# Patient Record
Sex: FEMALE | Race: WHITE | NOT HISPANIC OR LATINO | Employment: FULL TIME | URBAN - METROPOLITAN AREA
[De-identification: names, ages, dates, MRNs, and addresses within clinical notes are randomized per-mention and may not be internally consistent; named-entity substitution may affect disease eponyms.]

---

## 2017-10-03 ENCOUNTER — OFFICE VISIT (OUTPATIENT)
Dept: URGENT CARE | Facility: CLINIC | Age: 52
End: 2017-10-03
Payer: COMMERCIAL

## 2017-10-03 ENCOUNTER — GENERIC CONVERSION - ENCOUNTER (OUTPATIENT)
Dept: OTHER | Facility: OTHER | Age: 52
End: 2017-10-03

## 2017-10-03 PROCEDURE — 93005 ELECTROCARDIOGRAM TRACING: CPT

## 2017-10-03 PROCEDURE — 99203 OFFICE O/P NEW LOW 30 MIN: CPT

## 2017-10-05 NOTE — PROGRESS NOTES
Assessment  1  Cellulitis of left lower extremity (682 6) (L03 [id number])    Plan  Cellulitis of left lower extremity    · Cephalexin 500 MG Oral Capsule; TAKE 1 CAPSULE 3 TIMES DAILY UNTIL GONE    Discussion/Summary  Discussion Summary:   Start antibiotic and take as directed  If area of redness is spreading please go to the emergency room  Medication Side Effects Reviewed: Possible side effects of new medications were reviewed with the patient/guardian today  Understands and agrees with treatment plan: The treatment plan was reviewed with the patient/guardian  The patient/guardian understands and agrees with the treatment plan   Follow Up Instructions: Follow Up with your Primary Care Provider in 7 days  If your symptoms worsen, go to the nearest [place] [address] Emergency Department  Chief Complaint  Chief Complaint Free Text Note Form: C/O swelling in b/l lower legs and feet , with red patch on lower posterior left leg after riding a bus for several hours 2 days ago  The swelling has decreased in b/l legs but the reddened area persists  History of Present Illness  HPI: 78-year-old female here with complaint of bilateral leg swelling after sitting on a bus for several hours 2 days ago  Started with a red area on her left lower posterior leg  The swelling in her legs has decreased but she has a persistent red tender area in the back of her lower leg  She also has some chills  Denies fever  Hospital Based Practices Required Assessment:   Pain Assessment   the patient states they do not have pain  (on a scale of 0 to 10, the patient rates the pain at 0 )   Abuse And Domestic Violence Screen    Yes, the patient is safe at home -The patient states no one is hurting them  Depression And Suicide Screen  No, the patient has not had thoughts of hurting themself  No, the patient has not felt depressed in the past 7 days  Rash: BETSY WAGONER presents with complaints of rash     Associated symptoms include pain,-skin redness-and-skin swelling, but-no skin blistering,-no skin bumps,-no cracking,-no crusting,-no draining,-no skin dryness,-no skin oiliness-and-no pruritus  Review of Systems  Focused-Female:   Constitutional: chills, but-no fever-and-not feeling poorly  ENT: no ear ache, no loss of hearing, no nosebleeds or nasal discharge, no sore throat or hoarseness  Cardiovascular: no complaints of slow or fast heart rate, no chest pain, no palpitations, no leg claudication or lower extremity edema  Respiratory: no complaints of shortness of breath, no wheezing, no dyspnea on exertion, no orthopnea or PND  Integumentary: as noted in HPI  ROS Reviewed:   ROS reviewed  Past Medical History  Active Problems And Past Medical History Reviewed: The active problems and past medical history were reviewed and updated today  Family History  Family History Reviewed: The family history was reviewed and updated today  Social History  Social History Reviewed: The social history was reviewed and updated today  The social history was reviewed and is unchanged  Surgical History  Surgical History Reviewed: The surgical history was reviewed and updated today  Current Meds   1  Amiodarone HCl - 100 MG Oral Tablet; Therapy: (Recorded:[id number]) to Recorded   2  Eliquis 5 MG Oral Tablet; Therapy: (Recorded:[id number]) to Recorded   3  Metoprolol Tartrate 25 MG Oral Tablet; Therapy: (51 196 19 99) to Recorded   4  Rosuvastatin Calcium 20 MG Oral Tablet; Therapy: (51 196 19 99) to Recorded  Medication List Reviewed: The medication list was reviewed and updated today  Allergies  1  aspirin    Physical Exam    Constitutional   General appearance: No acute distress, well appearing and well nourished  Ears, Nose, Mouth, and Throat   External inspection of ears and nose: Normal     Otoscopic examination: Tympanic membranes translucent with normal light reflex   Canals patent without erythema  Nasal mucosa, septum, and turbinates: Normal without edema or erythema  Oropharynx: Normal with no erythema, edema, exudate or lesions  Pulmonary   Respiratory effort: No increased work of breathing or signs of respiratory distress  Auscultation of lungs: Clear to auscultation  Cardiovascular   Auscultation of heart: Normal rate and rhythm, normal S1 and S2, without murmurs  Examination of extremities for edema and/or varicosities: Abnormal  -Varicose veins bilateral lower extremities  Area on posterior lower left leg with erythema measures approximately 2-3 inches in diameter  Tender to palpation        Signatures   Electronically signed by : Hamilton Aleman [facility]; Oct  3 2017 11:35AM EST                       (Author)    Electronically signed by : SHA Coleman ; Oct  4 2017 12:18PM EST                       (Co-author)

## 2018-01-12 VITALS
SYSTOLIC BLOOD PRESSURE: 100 MMHG | WEIGHT: 200 LBS | DIASTOLIC BLOOD PRESSURE: 60 MMHG | TEMPERATURE: 98.5 F | HEART RATE: 58 BPM | RESPIRATION RATE: 18 BRPM | OXYGEN SATURATION: 96 %

## 2018-06-26 ENCOUNTER — TELEPHONE (OUTPATIENT)
Dept: FAMILY MEDICINE CLINIC | Facility: CLINIC | Age: 53
End: 2018-06-26

## 2018-07-11 ENCOUNTER — OFFICE VISIT (OUTPATIENT)
Dept: FAMILY MEDICINE CLINIC | Facility: CLINIC | Age: 53
End: 2018-07-11
Payer: COMMERCIAL

## 2018-07-11 VITALS
BODY MASS INDEX: 31.1 KG/M2 | HEART RATE: 83 BPM | HEIGHT: 69 IN | RESPIRATION RATE: 19 BRPM | OXYGEN SATURATION: 98 % | SYSTOLIC BLOOD PRESSURE: 98 MMHG | DIASTOLIC BLOOD PRESSURE: 64 MMHG | TEMPERATURE: 96.9 F | WEIGHT: 210 LBS

## 2018-07-11 DIAGNOSIS — I48.0 PAROXYSMAL ATRIAL FIBRILLATION (HCC): ICD-10-CM

## 2018-07-11 DIAGNOSIS — R42 DIZZINESS: Primary | ICD-10-CM

## 2018-07-11 DIAGNOSIS — I10 BENIGN HYPERTENSION: ICD-10-CM

## 2018-07-11 DIAGNOSIS — I50.22 CHRONIC SYSTOLIC HEART FAILURE (HCC): ICD-10-CM

## 2018-07-11 LAB
ALBUMIN SERPL BCP-MCNC: 4.2 G/DL (ref 3.5–5)
ALP SERPL-CCNC: 52 U/L (ref 46–116)
ALT SERPL W P-5'-P-CCNC: 29 U/L (ref 12–78)
ANION GAP SERPL CALCULATED.3IONS-SCNC: 7 MMOL/L (ref 4–13)
AST SERPL W P-5'-P-CCNC: 14 U/L (ref 5–45)
BASOPHILS # BLD AUTO: 0.04 THOUSANDS/ΜL (ref 0–0.1)
BASOPHILS NFR BLD AUTO: 1 % (ref 0–1)
BILIRUB SERPL-MCNC: 0.69 MG/DL (ref 0.2–1)
BUN SERPL-MCNC: 40 MG/DL (ref 5–25)
CALCIUM SERPL-MCNC: 9.1 MG/DL (ref 8.3–10.1)
CHLORIDE SERPL-SCNC: 106 MMOL/L (ref 100–108)
CO2 SERPL-SCNC: 23 MMOL/L (ref 21–32)
CREAT SERPL-MCNC: 1.97 MG/DL (ref 0.6–1.3)
EOSINOPHIL # BLD AUTO: 0.29 THOUSAND/ΜL (ref 0–0.61)
EOSINOPHIL NFR BLD AUTO: 4 % (ref 0–6)
ERYTHROCYTE [DISTWIDTH] IN BLOOD BY AUTOMATED COUNT: 12.3 % (ref 11.6–15.1)
GFR SERPL CREATININE-BSD FRML MDRD: 28 ML/MIN/1.73SQ M
GLUCOSE P FAST SERPL-MCNC: 97 MG/DL (ref 65–99)
HCT VFR BLD AUTO: 40 % (ref 34.8–46.1)
HGB BLD-MCNC: 13.2 G/DL (ref 11.5–15.4)
IMM GRANULOCYTES # BLD AUTO: 0.02 THOUSAND/UL (ref 0–0.2)
IMM GRANULOCYTES NFR BLD AUTO: 0 % (ref 0–2)
LYMPHOCYTES # BLD AUTO: 2.84 THOUSANDS/ΜL (ref 0.6–4.47)
LYMPHOCYTES NFR BLD AUTO: 36 % (ref 14–44)
MCH RBC QN AUTO: 30.6 PG (ref 26.8–34.3)
MCHC RBC AUTO-ENTMCNC: 33 G/DL (ref 31.4–37.4)
MCV RBC AUTO: 93 FL (ref 82–98)
MONOCYTES # BLD AUTO: 0.62 THOUSAND/ΜL (ref 0.17–1.22)
MONOCYTES NFR BLD AUTO: 8 % (ref 4–12)
NEUTROPHILS # BLD AUTO: 4.15 THOUSANDS/ΜL (ref 1.85–7.62)
NEUTS SEG NFR BLD AUTO: 51 % (ref 43–75)
NRBC BLD AUTO-RTO: 0 /100 WBCS
PLATELET # BLD AUTO: 228 THOUSANDS/UL (ref 149–390)
PMV BLD AUTO: 11.4 FL (ref 8.9–12.7)
POTASSIUM SERPL-SCNC: 4.5 MMOL/L (ref 3.5–5.3)
PROT SERPL-MCNC: 7.8 G/DL (ref 6.4–8.2)
RBC # BLD AUTO: 4.31 MILLION/UL (ref 3.81–5.12)
SODIUM SERPL-SCNC: 136 MMOL/L (ref 136–145)
TSH SERPL DL<=0.05 MIU/L-ACNC: 0.69 UIU/ML (ref 0.36–3.74)
WBC # BLD AUTO: 7.96 THOUSAND/UL (ref 4.31–10.16)

## 2018-07-11 PROCEDURE — 36415 COLL VENOUS BLD VENIPUNCTURE: CPT | Performed by: FAMILY MEDICINE

## 2018-07-11 PROCEDURE — 80053 COMPREHEN METABOLIC PANEL: CPT | Performed by: FAMILY MEDICINE

## 2018-07-11 PROCEDURE — 85025 COMPLETE CBC W/AUTO DIFF WBC: CPT | Performed by: FAMILY MEDICINE

## 2018-07-11 PROCEDURE — 99214 OFFICE O/P EST MOD 30 MIN: CPT | Performed by: FAMILY MEDICINE

## 2018-07-11 PROCEDURE — 84443 ASSAY THYROID STIM HORMONE: CPT | Performed by: FAMILY MEDICINE

## 2018-07-11 RX ORDER — METOPROLOL SUCCINATE 25 MG/1
25 TABLET, EXTENDED RELEASE ORAL 2 TIMES DAILY
COMMUNITY
Start: 2017-11-23

## 2018-07-11 RX ORDER — ROSUVASTATIN CALCIUM 20 MG/1
20 TABLET, COATED ORAL
COMMUNITY
Start: 2018-03-26

## 2018-07-11 RX ORDER — APREMILAST 30 MG/1
TABLET, FILM COATED ORAL 2 TIMES DAILY
COMMUNITY
Start: 2018-07-10 | End: 2018-12-07 | Stop reason: ALTCHOICE

## 2018-07-11 RX ORDER — ACETAMINOPHEN 500 MG
500 TABLET ORAL EVERY 6 HOURS
COMMUNITY
End: 2018-12-07 | Stop reason: SDUPTHER

## 2018-07-11 RX ORDER — LISINOPRIL 10 MG/1
5 TABLET ORAL DAILY
COMMUNITY
Start: 2018-05-23 | End: 2021-04-16

## 2018-07-11 RX ORDER — SPIRONOLACTONE 25 MG/1
TABLET ORAL
COMMUNITY
Start: 2018-05-23 | End: 2018-07-24 | Stop reason: ALTCHOICE

## 2018-07-11 RX ORDER — DOFETILIDE 0.25 MG/1
CAPSULE ORAL
COMMUNITY
Start: 2018-06-21 | End: 2018-11-09 | Stop reason: ALTCHOICE

## 2018-07-11 RX ORDER — FUROSEMIDE 40 MG/1
1 TABLET ORAL SEE ADMIN INSTRUCTIONS
COMMUNITY
Start: 2018-06-19

## 2018-07-11 RX ORDER — RIVAROXABAN 20 MG/1
TABLET, FILM COATED ORAL
COMMUNITY
Start: 2018-07-05

## 2018-07-11 NOTE — PATIENT INSTRUCTIONS
1  Blood pressure is too low in the range of  systolic over 35-74 diastolic  She is on furosemide and spironolactone so we will discontinue spironolactone starting tomorrow  Increase fluids and okay to have some salt pretzels today  2  We will contact her cardiologist to arrange a Holter monitor  I will also update them the medication changes  3  Obtain blood work today to check for anemia hypothyroidism kidney or electrolyte abnormalities  4  Follow up with me in 1 week for a blood pressure check  5  Discussed signs and symptoms of emergency and went to go to the local emergency room

## 2018-07-15 PROBLEM — I42.9 CARDIOMYOPATHY (HCC): Status: ACTIVE | Noted: 2017-10-09

## 2018-07-15 PROBLEM — I10 BENIGN HYPERTENSION: Status: ACTIVE | Noted: 2018-05-25

## 2018-07-15 PROBLEM — I50.20 SYSTOLIC HEART FAILURE (HCC): Status: ACTIVE | Noted: 2018-01-24

## 2018-07-15 PROBLEM — I73.9 PERIPHERAL VASCULAR DISEASE (HCC): Status: ACTIVE | Noted: 2017-10-03

## 2018-07-15 PROBLEM — I48.0 PAROXYSMAL ATRIAL FIBRILLATION (HCC): Status: ACTIVE | Noted: 2018-02-23

## 2018-07-15 PROBLEM — I25.10 CAD (CORONARY ARTERY DISEASE): Status: ACTIVE | Noted: 2018-01-30

## 2018-07-15 PROBLEM — I50.22 CHRONIC SYSTOLIC CHF (CONGESTIVE HEART FAILURE) (HCC): Status: ACTIVE | Noted: 2018-01-30

## 2018-07-15 PROBLEM — E78.00 PURE HYPERCHOLESTEROLEMIA: Status: ACTIVE | Noted: 2018-05-25

## 2018-07-16 NOTE — ASSESSMENT & PLAN NOTE
Her EF has improved recently  She has seen cardiology and has regular follow up  I called and spoke to her specialist and he agreed that she should discontinue spironolactone due to hypotension  Monitor bp and f/u with cardiology  F/u with me in 1 week  Signs and symptoms of emergency discussed and when to call 911

## 2018-07-16 NOTE — PROGRESS NOTES
Assessment/Plan:    Systolic heart failure ([facility] [address] )  Her EF has improved recently  She has seen cardiology and has regular follow up  I called and spoke to her specialist and he agreed that she should discontinue spironolactone due to hypotension  Monitor bp and f/u with cardiology  F/u with me in 1 week  Signs and symptoms of emergency discussed and when to call 911  Paroxysmal atrial fibrillation (HCC)  The current medical regimen is effective;  continue present plan and medications  1  Blood pressure is too low in the range of  systolic over 15-90 diastolic  She is on furosemide and spironolactone so we will discontinue spironolactone starting tomorrow  Increase fluids and okay to have some salt pretzels today  2  We will contact her cardiologist to arrange a Holter monitor  I will also update them the medication changes  3  Obtain blood work today to check for anemia hypothyroidism kidney or electrolyte abnormalities  4  Follow up with me in 1 week for a blood pressure check  5  Discussed signs and symptoms of emergency and went to go to the local emergency room  Problem List Items Addressed This Visit     Systolic heart failure ([facility] )     Her EF has improved recently  She has seen cardiology and has regular follow up  I called and spoke to her specialist and he agreed that she should discontinue spironolactone due to hypotension  Monitor bp and f/u with cardiology  F/u with me in 1 week  Signs and symptoms of emergency discussed and when to call 911  Relevant Medications    metoprolol succinate (TOPROL-XL) 25 mg 24 hr tablet    Paroxysmal atrial fibrillation (HCC)     The current medical regimen is effective;  continue present plan and medications             Relevant Medications    metoprolol succinate (TOPROL-XL) 25 mg 24 hr tablet    Benign hypertension    Relevant Medications    furosemide (LASIX) 40 mg tablet    lisinopril (ZESTRIL) 10 mg tablet    metoprolol succinate (TOPROL-XL) 25 mg 24 hr tablet    spironolactone (ALDACTONE) 25 mg tablet    Other Relevant Orders    CBC and differential (Completed)    Comprehensive metabolic panel (Completed)    TSH, 3rd generation with T4 reflex (Completed)      Other Visit Diagnoses     Dizziness    -  Primary            Subjective:      Patient ID: Chevy Alas is a 52 y o  female  57-year-old female who presents today for an acute visit  Her past medical history is significant for cardiomyopathy, coronary artery disease, paroxysmal atrial fibrillation, status post recent ablation, chronic anticoagulation, hypertension, hyperlipidemia, heart failure, and psoriatic arthropathy  She states that over the past 2 days she has had significant shortness of breath any time she gets up and moves around and dizziness  She has monitored her blood pressure but her machine recently has not been picking up her blood pressure readings  She denies any chest pain, cough, nausea vomiting fever chills, recent URI, abdominal pain, diarrhea, urinary frequency, lower extremity edema, PND, or orthopnea  She has been compliant with her medications  She has felt an overall decreased energy levels and feeling very weak at times  Her dizziness is mainly related to standing and walking  She denies any vertigo  Shortness of Breath   Pertinent negatives include no abdominal pain, chest pain, leg swelling, sore throat or wheezing  Her past medical history is significant for asthma  Asthma   She complains of shortness of breath  There is no cough or wheezing  Pertinent negatives include no chest pain, postnasal drip, sore throat or trouble swallowing  Her past medical history is significant for asthma  Dizziness   Associated symptoms include arthralgias, fatigue and weakness  Pertinent negatives include no abdominal pain, chest pain, chills, congestion, coughing, diaphoresis, nausea or sore throat         The following portions of the patient's history were reviewed and updated as appropriate: allergies, current medications, past family history, past medical history, past social history, past surgical history and problem list     Review of Systems   Constitutional: Positive for activity change and fatigue  Negative for chills, diaphoresis and unexpected weight change  HENT: Negative for congestion, postnasal drip, sinus pressure, sore throat and trouble swallowing  Eyes: Negative for visual disturbance  Respiratory: Positive for shortness of breath  Negative for cough, chest tightness and wheezing  Cardiovascular: Negative for chest pain, palpitations and leg swelling  Gastrointestinal: Negative for abdominal pain, diarrhea and nausea  Endocrine: Negative for cold intolerance and heat intolerance  Genitourinary: Negative for dysuria, frequency, hematuria and urgency  Musculoskeletal: Positive for arthralgias, back pain and gait problem  Neurological: Positive for dizziness, weakness and light-headedness  Negative for tremors, seizures, syncope and speech difficulty  Psychiatric/Behavioral: The patient is nervous/anxious  Objective:      BP 98/64 (BP Location: Right arm, Patient Position: Sitting, Cuff Size: Standard)   Pulse 83   Temp (!) 96 9 °F (36 1 °C)   Resp 19   Ht 5' 9" (1 753 m)   Wt 95 3 kg (210 lb)   SpO2 98%   BMI 31 01 kg/m²          Physical Exam   Constitutional: She appears well-developed and well-nourished  She is cooperative  No distress  HENT:   Head: Normocephalic and atraumatic  Eyes: Conjunctivae are normal  No scleral icterus  Neck: Normal range of motion  Neck supple  No thyromegaly present  Cardiovascular: Normal rate, regular rhythm, normal heart sounds and intact distal pulses  No murmur heard  Pulmonary/Chest: Effort normal and breath sounds normal  No respiratory distress  She has no wheezes  She has no rales  Musculoskeletal: She exhibits no edema     Neurological: She is alert    Skin: She is not diaphoretic  Psychiatric: She has a normal mood and affect   Her behavior is normal  Judgment and thought content normal

## 2018-07-18 ENCOUNTER — TELEPHONE (OUTPATIENT)
Dept: FAMILY MEDICINE CLINIC | Facility: CLINIC | Age: 53
End: 2018-07-18

## 2018-07-18 ENCOUNTER — APPOINTMENT (OUTPATIENT)
Dept: LAB | Facility: CLINIC | Age: 53
End: 2018-07-18
Payer: COMMERCIAL

## 2018-07-18 DIAGNOSIS — N18.30 CKD (CHRONIC KIDNEY DISEASE) STAGE 3, GFR 30-59 ML/MIN (HCC): ICD-10-CM

## 2018-07-18 DIAGNOSIS — N18.30 CKD (CHRONIC KIDNEY DISEASE) STAGE 3, GFR 30-59 ML/MIN (HCC): Primary | ICD-10-CM

## 2018-07-18 LAB
ANION GAP SERPL CALCULATED.3IONS-SCNC: 8 MMOL/L (ref 4–13)
BUN SERPL-MCNC: 37 MG/DL (ref 5–25)
CALCIUM SERPL-MCNC: 9.3 MG/DL (ref 8.3–10.1)
CHLORIDE SERPL-SCNC: 108 MMOL/L (ref 100–108)
CO2 SERPL-SCNC: 24 MMOL/L (ref 21–32)
CREAT SERPL-MCNC: 1.46 MG/DL (ref 0.6–1.3)
GFR SERPL CREATININE-BSD FRML MDRD: 40 ML/MIN/1.73SQ M
GLUCOSE SERPL-MCNC: 104 MG/DL (ref 65–140)
POTASSIUM SERPL-SCNC: 4 MMOL/L (ref 3.5–5.3)
SODIUM SERPL-SCNC: 140 MMOL/L (ref 136–145)

## 2018-07-18 PROCEDURE — 36415 COLL VENOUS BLD VENIPUNCTURE: CPT

## 2018-07-18 PROCEDURE — 80048 BASIC METABOLIC PNL TOTAL CA: CPT

## 2018-07-18 NOTE — TELEPHONE ENCOUNTER
Please call patient inform that all of her blood work was overall good except for her kidney function test which was slightly high  Please have a repeat a basic metabolic panel and then schedule a follow-up appointment to see me next week

## 2018-07-24 ENCOUNTER — OFFICE VISIT (OUTPATIENT)
Dept: FAMILY MEDICINE CLINIC | Facility: CLINIC | Age: 53
End: 2018-07-24
Payer: COMMERCIAL

## 2018-07-24 ENCOUNTER — TELEPHONE (OUTPATIENT)
Dept: FAMILY MEDICINE CLINIC | Facility: CLINIC | Age: 53
End: 2018-07-24

## 2018-07-24 VITALS
TEMPERATURE: 98.9 F | SYSTOLIC BLOOD PRESSURE: 108 MMHG | WEIGHT: 213 LBS | OXYGEN SATURATION: 98 % | HEIGHT: 70 IN | RESPIRATION RATE: 18 BRPM | HEART RATE: 88 BPM | BODY MASS INDEX: 30.49 KG/M2 | DIASTOLIC BLOOD PRESSURE: 62 MMHG

## 2018-07-24 DIAGNOSIS — I42.8 OTHER CARDIOMYOPATHY (HCC): ICD-10-CM

## 2018-07-24 DIAGNOSIS — I10 BENIGN HYPERTENSION: Primary | Chronic | ICD-10-CM

## 2018-07-24 DIAGNOSIS — I50.22 CHRONIC SYSTOLIC CHF (CONGESTIVE HEART FAILURE) (HCC): ICD-10-CM

## 2018-07-24 PROCEDURE — 3008F BODY MASS INDEX DOCD: CPT | Performed by: FAMILY MEDICINE

## 2018-07-24 PROCEDURE — 99214 OFFICE O/P EST MOD 30 MIN: CPT | Performed by: FAMILY MEDICINE

## 2018-07-24 NOTE — LETTER
July 24, 2018     Patient: Vasiliy Bahena   YOB: 1965   Date of Visit: 7/24/2018       To Whom it May Concern:    Oneal Pace is under my professional care  She was seen in my office on 7/24/2018  She has chronic conditions with multiple problems and is unable to return to work at this time  If you have any questions or concerns, please don't hesitate to call           Sincerely,          Aniya Keller MD

## 2018-07-24 NOTE — TELEPHONE ENCOUNTER
Please call patient and inform that I spoke to her cardiologist   He agrees that we need to lower her furosemide to 20 mg once a day, repeat her basic metabolic panel blood work next week, and see me for a follow-up the following week  She should continue to try to monitor her blood pressure at home  If her blood pressure continues to be low at her next visit with me in 2 weeks, we would need to reduce her lisinopril medication to 5 mg daily

## 2018-07-24 NOTE — PROGRESS NOTES
Assessment/Plan:  1  Will call her cardiologist today and discussed medication changes related to persistent hypotension and prerenal azotemia  Overall with discontinuing Aldactone she has felt somewhat better and her renal function has improved with a creatinine of 1 5 range  It was as high as 1 9   2   Discussed the importance of hydration  3   I discussed regarding fall precautions and to stand up slowly and if she feels lightheaded to sit back down  Also discussed signs and symptoms of emergency and when to call 911   4   She will be repeating her BMP in 1 week  5   She will see me for a follow-up appointment in 2 weeks  Cardiomyopathy [facility])  The current medical regimen is effective;  continue present plan and medications  Benign hypertension  Reduce Lasix to 20mg a day after consulting with cardiology      CAD (coronary artery disease)  The current medical regimen is effective;  continue present plan and medications  Chronic systolic CHF (congestive heart failure) ([address] )    continu    Psoriasis  The current medical regimen is effective;  continue present plan and medications  Diagnoses and all orders for this visit:    Benign hypertension  -     Basic metabolic panel; Future    Chronic systolic CHF (congestive heart failure) (HCC)  -     Basic metabolic panel; Future    Other cardiomyopathy ([address] )          Subjective:      Patient ID: Brennon Villalobos is a 52 y o  female  52 yof is here to follow up on symptomatic hypotension due to medication  Her kidney function has improved  She is no longer in GI distress after eating  Has been able to eat more and has gained some weight  Still lightheaded and has difficulty breathing/shortness of breath and severe dyspnea on exertion  BP cuff at home wont register because it is still too low  Anxiety   Symptoms include shortness of breath  Patient reports no chest pain, dizziness or nausea         Headache    Pertinent negatives include no abdominal pain, dizziness, nausea or vomiting  The following portions of the patient's history were reviewed and updated as appropriate: allergies, current medications, past family history, past medical history, past social history, past surgical history and problem list     Review of Systems   Constitutional: Positive for unexpected weight change  Negative for appetite change and fatigue  Eyes: Negative for visual disturbance  Respiratory: Positive for shortness of breath  Cardiovascular: Negative for chest pain  Gastrointestinal: Negative for abdominal pain, diarrhea, nausea and vomiting  Genitourinary: Positive for frequency  Musculoskeletal: Positive for arthralgias  Negative for myalgias  Neurological: Positive for light-headedness and headaches  Negative for dizziness  Objective:      /62   Pulse 88   Temp 98 9 °F (37 2 °C)   Resp 18   Ht 5' 10" (1 778 m)   Wt 96 6 kg (213 lb)   SpO2 98%   BMI 30 56 kg/m²          Physical Exam   Constitutional: She appears well-developed and well-nourished  She is cooperative  No distress  HENT:   Head: Normocephalic and atraumatic  Eyes: Conjunctivae are normal  No scleral icterus  Neck: Normal range of motion  Neck supple  No thyromegaly present  Cardiovascular: Normal rate, regular rhythm, normal heart sounds and intact distal pulses  No murmur heard  Pulmonary/Chest: Effort normal and breath sounds normal  No respiratory distress  She has no wheezes  She has no rales  Musculoskeletal: She exhibits no edema  Neurological: She is alert  Skin: She is not diaphoretic  Psychiatric: She has a normal mood and affect   Her behavior is normal  Judgment and thought content normal

## 2018-07-24 NOTE — PATIENT INSTRUCTIONS
1   Will call her cardiologist today and discussed medication changes related to persistent hypotension and prerenal azotemia  Overall with discontinuing Aldactone she has felt somewhat better and her renal function has improved with a creatinine of 1 5 range  It was as high as 1 9   2   Discussed the importance of hydration  3   I discussed regarding fall precautions and to stand up slowly and if she feels lightheaded to sit back down  Also discussed signs and symptoms of emergency and when to call 911   4   She will be repeating her BMP in 1 week  5   She will see me for a follow-up appointment in 2 weeks

## 2018-07-24 NOTE — TELEPHONE ENCOUNTER
I called Dr Louise Lab office the cardio at [facility]    Per Dr Baxter Done he wants to  Speak to Dr Aspen Ramirez about this pt    Jose Rice will inform the physician and have him call us back today

## 2018-07-27 ENCOUNTER — TELEPHONE (OUTPATIENT)
Dept: FAMILY MEDICINE CLINIC | Facility: CLINIC | Age: 53
End: 2018-07-27

## 2018-07-27 NOTE — TELEPHONE ENCOUNTER
Pt called and states she is having SOB and her BP is so low she is unable to get a ready  Pt will go to ER for evaluation and then f/u with our office

## 2018-08-06 ENCOUNTER — APPOINTMENT (OUTPATIENT)
Dept: LAB | Facility: CLINIC | Age: 53
End: 2018-08-06
Payer: COMMERCIAL

## 2018-08-06 DIAGNOSIS — I10 BENIGN HYPERTENSION: Chronic | ICD-10-CM

## 2018-08-06 DIAGNOSIS — I50.22 CHRONIC SYSTOLIC CHF (CONGESTIVE HEART FAILURE) (HCC): ICD-10-CM

## 2018-08-06 LAB
ALBUMIN SERPL BCP-MCNC: 3.9 G/DL (ref 3.5–5)
ALP SERPL-CCNC: 55 U/L (ref 46–116)
ALT SERPL W P-5'-P-CCNC: 31 U/L (ref 12–78)
ANION GAP SERPL CALCULATED.3IONS-SCNC: 8 MMOL/L (ref 4–13)
AST SERPL W P-5'-P-CCNC: 19 U/L (ref 5–45)
BASOPHILS # BLD AUTO: 0.04 THOUSANDS/ΜL (ref 0–0.1)
BASOPHILS NFR BLD AUTO: 1 % (ref 0–1)
BILIRUB SERPL-MCNC: 0.82 MG/DL (ref 0.2–1)
BUN SERPL-MCNC: 31 MG/DL (ref 5–25)
CALCIUM SERPL-MCNC: 9 MG/DL (ref 8.3–10.1)
CHLORIDE SERPL-SCNC: 106 MMOL/L (ref 100–108)
CO2 SERPL-SCNC: 25 MMOL/L (ref 21–32)
CREAT SERPL-MCNC: 1.24 MG/DL (ref 0.6–1.3)
EOSINOPHIL # BLD AUTO: 0.23 THOUSAND/ΜL (ref 0–0.61)
EOSINOPHIL NFR BLD AUTO: 4 % (ref 0–6)
ERYTHROCYTE [DISTWIDTH] IN BLOOD BY AUTOMATED COUNT: 13 % (ref 11.6–15.1)
GFR SERPL CREATININE-BSD FRML MDRD: 49 ML/MIN/1.73SQ M
GLUCOSE SERPL-MCNC: 86 MG/DL (ref 65–140)
HCT VFR BLD AUTO: 37.5 % (ref 34.8–46.1)
HGB BLD-MCNC: 12.2 G/DL (ref 11.5–15.4)
IMM GRANULOCYTES # BLD AUTO: 0.02 THOUSAND/UL (ref 0–0.2)
IMM GRANULOCYTES NFR BLD AUTO: 0 % (ref 0–2)
LYMPHOCYTES # BLD AUTO: 2.17 THOUSANDS/ΜL (ref 0.6–4.47)
LYMPHOCYTES NFR BLD AUTO: 40 % (ref 14–44)
MCH RBC QN AUTO: 30.9 PG (ref 26.8–34.3)
MCHC RBC AUTO-ENTMCNC: 32.5 G/DL (ref 31.4–37.4)
MCV RBC AUTO: 95 FL (ref 82–98)
MONOCYTES # BLD AUTO: 0.52 THOUSAND/ΜL (ref 0.17–1.22)
MONOCYTES NFR BLD AUTO: 10 % (ref 4–12)
NEUTROPHILS # BLD AUTO: 2.52 THOUSANDS/ΜL (ref 1.85–7.62)
NEUTS SEG NFR BLD AUTO: 45 % (ref 43–75)
NRBC BLD AUTO-RTO: 0 /100 WBCS
PLATELET # BLD AUTO: 210 THOUSANDS/UL (ref 149–390)
PMV BLD AUTO: 11.7 FL (ref 8.9–12.7)
POTASSIUM SERPL-SCNC: 4.3 MMOL/L (ref 3.5–5.3)
PROT SERPL-MCNC: 7.3 G/DL (ref 6.4–8.2)
RBC # BLD AUTO: 3.95 MILLION/UL (ref 3.81–5.12)
SODIUM SERPL-SCNC: 139 MMOL/L (ref 136–145)
TSH SERPL DL<=0.05 MIU/L-ACNC: 0.72 UIU/ML (ref 0.36–3.74)
WBC # BLD AUTO: 5.5 THOUSAND/UL (ref 4.31–10.16)

## 2018-08-06 PROCEDURE — 80053 COMPREHEN METABOLIC PANEL: CPT | Performed by: FAMILY MEDICINE

## 2018-08-06 PROCEDURE — 85025 COMPLETE CBC W/AUTO DIFF WBC: CPT | Performed by: FAMILY MEDICINE

## 2018-08-06 PROCEDURE — 84443 ASSAY THYROID STIM HORMONE: CPT | Performed by: FAMILY MEDICINE

## 2018-08-06 PROCEDURE — 36415 COLL VENOUS BLD VENIPUNCTURE: CPT | Performed by: FAMILY MEDICINE

## 2018-08-15 ENCOUNTER — OFFICE VISIT (OUTPATIENT)
Dept: FAMILY MEDICINE CLINIC | Facility: CLINIC | Age: 53
End: 2018-08-15
Payer: COMMERCIAL

## 2018-08-15 VITALS
DIASTOLIC BLOOD PRESSURE: 70 MMHG | BODY MASS INDEX: 30.92 KG/M2 | OXYGEN SATURATION: 97 % | RESPIRATION RATE: 16 BRPM | WEIGHT: 216 LBS | HEART RATE: 71 BPM | HEIGHT: 70 IN | TEMPERATURE: 97.6 F | SYSTOLIC BLOOD PRESSURE: 114 MMHG

## 2018-08-15 DIAGNOSIS — I50.22 CHRONIC SYSTOLIC CHF (CONGESTIVE HEART FAILURE) (HCC): Primary | ICD-10-CM

## 2018-08-15 PROCEDURE — 99214 OFFICE O/P EST MOD 30 MIN: CPT | Performed by: FAMILY MEDICINE

## 2018-08-15 NOTE — PATIENT INSTRUCTIONS
1  We will adjust furosemide as follows:   40 mg on Monday Wednesday and Friday, 20 mg the rest of the days of the week  2  Reduce lisinopril to 5 mg once a day  3  Check blood work in 2 weeks   4  See me in 3 weeks

## 2018-08-15 NOTE — PROGRESS NOTES
Assessment/Plan:     1  We will adjust furosemide as follows:   40 mg on Monday Wednesday and Friday, 20 mg the rest of the days of the week  Strongly recommended that she follow up with her cardiologist   Monitor daily weights and call if there are any changes  2  Reduce lisinopril to 5 mg once a day  Monitor blood pressure and heart rate  3  Check blood work in 2 weeks  Signs and symptoms of emergency were discussed with the patient when to call 911   4  See me in 3 weeks  No problem-specific Assessment & Plan notes found for this encounter  Diagnoses and all orders for this visit:    Chronic systolic CHF (congestive heart failure) ([facility] )  -     Basic metabolic panel; Future  -     NT-BNP PRO; Future          Subjective:      Patient ID: Tru Jordan is a 52 y o  female  25-year-old female who presents today for an acute visit  Her past medical history is significant for cardiomyopathy, coronary artery disease, paroxysmal atrial fibrillation, status post recent ablation, chronic anticoagulation, hypertension, hyperlipidemia, heart failure, and psoriatic arthropathy  Patient states that since lowering her furosemide to 20 mg daily, her dyspnea on exertion has worsened  She is struggling with even getting showered and dressed and walking short distances  She also feels like there is a little bit more swelling in her joints and an increased pain in her legs with swelling of her veins  She also has that increased burning in her feet and leg cramping lately  However, there has been improvement in her blood pressure and kidney function from her most recent blood work on August 9, 2018  The following portions of the patient's history were reviewed and updated as appropriate: allergies, current medications, past family history, past medical history, past social history, past surgical history and problem list     Review of Systems   Constitutional: Positive for fatigue   Negative for appetite change and diaphoresis  HENT: Negative for congestion, sinus pressure, sneezing and sore throat  Eyes: Negative for visual disturbance  Respiratory: Positive for shortness of breath  Negative for cough, choking, chest tightness, wheezing and stridor  Cardiovascular: Positive for leg swelling  Negative for chest pain and palpitations  Gastrointestinal: Negative for abdominal pain and nausea  Genitourinary: Negative for dysuria, enuresis, flank pain and frequency  Musculoskeletal: Positive for back pain  Skin: Negative for color change  Neurological: Positive for weakness  Negative for dizziness, tremors, syncope, speech difficulty, light-headedness, numbness and headaches  Psychiatric/Behavioral: Negative for agitation and confusion  Objective:      /70 (BP Location: Right arm, Patient Position: Sitting, Cuff Size: Standard)   Pulse 71   Temp 97 6 °F (36 4 °C) (Tympanic)   Resp 16   Ht 5' 10" (1 778 m)   Wt 98 kg (216 lb)   SpO2 97%   BMI 30 99 kg/m²          Physical Exam   Constitutional: She appears well-developed and well-nourished  She is cooperative  HENT:   Head: Normocephalic and atraumatic  Eyes: Conjunctivae are normal  No scleral icterus  Neck: Normal range of motion  Neck supple  No thyromegaly present  Cardiovascular: Normal rate, regular rhythm, normal heart sounds and intact distal pulses  No murmur heard  Pulmonary/Chest: Effort normal and breath sounds normal  No respiratory distress  She has no wheezes  She has no rales  Musculoskeletal: She exhibits edema  Neurological: She is alert  Psychiatric: She has a normal mood and affect   Her behavior is normal  Judgment and thought content normal

## 2018-08-20 ENCOUNTER — TELEPHONE (OUTPATIENT)
Dept: FAMILY MEDICINE CLINIC | Facility: CLINIC | Age: 53
End: 2018-08-20

## 2018-08-20 NOTE — TELEPHONE ENCOUNTER
Pt called and states she needs last office note in order to get her disability check  Last note faxed over to [phone] per Pt request  Confirmation received

## 2018-08-29 ENCOUNTER — APPOINTMENT (OUTPATIENT)
Dept: LAB | Facility: CLINIC | Age: 53
End: 2018-08-29
Payer: COMMERCIAL

## 2018-08-29 DIAGNOSIS — I50.22 CHRONIC SYSTOLIC CHF (CONGESTIVE HEART FAILURE) (HCC): ICD-10-CM

## 2018-08-29 LAB
ANION GAP SERPL CALCULATED.3IONS-SCNC: 10 MMOL/L (ref 4–13)
BUN SERPL-MCNC: 33 MG/DL (ref 5–25)
CALCIUM SERPL-MCNC: 9.4 MG/DL (ref 8.3–10.1)
CHLORIDE SERPL-SCNC: 113 MMOL/L (ref 100–108)
CO2 SERPL-SCNC: 23 MMOL/L (ref 21–32)
CREAT SERPL-MCNC: 1.58 MG/DL (ref 0.6–1.3)
GFR SERPL CREATININE-BSD FRML MDRD: 37 ML/MIN/1.73SQ M
GLUCOSE P FAST SERPL-MCNC: 108 MG/DL (ref 65–99)
NT-PROBNP SERPL-MCNC: 464 PG/ML
POTASSIUM SERPL-SCNC: 4.3 MMOL/L (ref 3.5–5.3)
SODIUM SERPL-SCNC: 146 MMOL/L (ref 136–145)

## 2018-08-29 PROCEDURE — 83880 ASSAY OF NATRIURETIC PEPTIDE: CPT

## 2018-08-29 PROCEDURE — 80048 BASIC METABOLIC PNL TOTAL CA: CPT

## 2018-08-29 PROCEDURE — 36415 COLL VENOUS BLD VENIPUNCTURE: CPT

## 2018-09-05 ENCOUNTER — OFFICE VISIT (OUTPATIENT)
Dept: FAMILY MEDICINE CLINIC | Facility: CLINIC | Age: 53
End: 2018-09-05
Payer: COMMERCIAL

## 2018-09-05 VITALS
TEMPERATURE: 98.2 F | WEIGHT: 213.8 LBS | DIASTOLIC BLOOD PRESSURE: 80 MMHG | OXYGEN SATURATION: 96 % | RESPIRATION RATE: 18 BRPM | BODY MASS INDEX: 30.68 KG/M2 | SYSTOLIC BLOOD PRESSURE: 120 MMHG | HEART RATE: 78 BPM

## 2018-09-05 DIAGNOSIS — I50.22 CHRONIC SYSTOLIC CHF (CONGESTIVE HEART FAILURE) (HCC): ICD-10-CM

## 2018-09-05 DIAGNOSIS — I10 ESSENTIAL HYPERTENSION: ICD-10-CM

## 2018-09-05 DIAGNOSIS — I42.8 OTHER CARDIOMYOPATHY (HCC): ICD-10-CM

## 2018-09-05 DIAGNOSIS — I13.0 BENIGN HYPERTENSIVE HEART AND CKD, STAGE 3 (GFR 30-59), W CHF (HCC): Primary | Chronic | ICD-10-CM

## 2018-09-05 DIAGNOSIS — I73.9 PERIPHERAL VASCULAR DISEASE (HCC): ICD-10-CM

## 2018-09-05 DIAGNOSIS — N18.30 BENIGN HYPERTENSIVE HEART AND CKD, STAGE 3 (GFR 30-59), W CHF (HCC): Primary | Chronic | ICD-10-CM

## 2018-09-05 PROBLEM — J43.8 OTHER EMPHYSEMA (HCC): Chronic | Status: ACTIVE | Noted: 2018-09-05

## 2018-09-05 PROCEDURE — 3079F DIAST BP 80-89 MM HG: CPT | Performed by: FAMILY MEDICINE

## 2018-09-05 PROCEDURE — 3074F SYST BP LT 130 MM HG: CPT | Performed by: FAMILY MEDICINE

## 2018-09-05 PROCEDURE — 99214 OFFICE O/P EST MOD 30 MIN: CPT | Performed by: FAMILY MEDICINE

## 2018-09-10 NOTE — PROGRESS NOTES
Assessment/Plan:           Problem List Items Addressed This Visit     Peripheral vascular disease ([facility] )     The current medical regimen is effective;  continue present plan and medications  F/u with cardiology         Cardiomyopathy [facility])    Relevant Orders    Basic metabolic panel    Ambulatory referral to Nephrology    Chronic systolic CHF (congestive heart failure) ([facility] )     The current medical regimen is effective;  continue present plan and medications  F/u with cardiology         Relevant Orders    NT-BNP PRO    Ambulatory referral to Nephrology    Hypertension     The current medical regimen is effective;  continue present plan and medications  Benign hypertensive heart and CKD, stage 3 (GFR 30-59), w CHF ([facility] ) - Primary (Chronic)     The current medical regimen is effective;  continue present plan and medications  Referral to nephrology  Relevant Orders    Basic metabolic panel    NT-BNP PRO    Ambulatory referral to Nephrology            Subjective:      Patient ID: Dinorah Martinez is a 52 y o  female  49-year-old female with past medical history of hypertensive heart disease, CKD stage 3, chronic congestive heart failure, cardiomyopathy, emphysema, psoriatic arthropathy presents today for follow-up of her chronic conditions  She continues to complain of class 3-4 heart failure symptoms  In addition she continually has chronic arthritic pain related to her psoriasis  She is taking her Lasix as prescribed  She does have a follow-up with her cardiologist, but does not have a nephrologist for her chronic kidney disease  She complains of fatigue and dyspnea on exertion even with activities of daily living  She has mild lower extremity edema  She denies any angina or palpitations          The following portions of the patient's history were reviewed and updated as appropriate: allergies, current medications, past family history, past medical history, past social history, past surgical history and problem list     Review of Systems   Constitutional: Positive for appetite change and fatigue  Negative for diaphoresis, fever and unexpected weight change  HENT: Negative for congestion and trouble swallowing  Eyes: Negative for visual disturbance  Respiratory: Positive for cough and shortness of breath  Negative for apnea, choking, chest tightness and wheezing  Cardiovascular: Negative for chest pain, palpitations and leg swelling  Gastrointestinal: Negative for abdominal pain  Genitourinary: Negative for frequency  Musculoskeletal: Positive for arthralgias, gait problem and joint swelling  Neurological: Positive for light-headedness  Negative for dizziness, syncope, weakness and headaches  Hematological: Does not bruise/bleed easily  Objective:      /80 (BP Location: Left arm, Patient Position: Sitting, Cuff Size: Standard)   Pulse 78   Temp 98 2 °F (36 8 °C) (Tympanic)   Resp 18   Wt 97 kg (213 lb 12 8 oz)   SpO2 96%   BMI 30 68 kg/m²          Physical Exam   Constitutional: She appears well-developed  She is cooperative  No distress  Ill appearing   HENT:   Head: Normocephalic and atraumatic  Eyes: Conjunctivae are normal  No scleral icterus  Neck: Normal range of motion  Neck supple  JVD present  No thyromegaly present  Cardiovascular: Normal rate, regular rhythm, normal heart sounds and intact distal pulses  No murmur heard  Pulmonary/Chest: Effort normal and breath sounds normal  No respiratory distress  She has no wheezes  She has no rales  Musculoskeletal: She exhibits no edema  1+ LE edema pitting to mid shins bilaterally   Neurological: She is alert  Skin: She is not diaphoretic  Psychiatric: She has a normal mood and affect   Her behavior is normal  Judgment and thought content normal

## 2018-09-10 NOTE — PATIENT INSTRUCTIONS
DASH Eating Plan   AMBULATORY CARE:   The DASH (Dietary Approaches to Stop Hypertension) Eating Plan  is designed to help prevent or lower high blood pressure  It can also help to lower LDL (bad) cholesterol and decrease your risk for heart disease  The plan is low in sodium, sugar, unhealthy fats, and total fat  It is high in potassium, calcium, magnesium, and fiber  These nutrients are added when you eat more fruits, vegetables, and whole grains  Your sodium limit each day: Your dietitian will tell you how much sodium is safe for you to have each day  People with high blood pressure should have no more than 1,500 to 2,300 mg of sodium in a day  A teaspoon (tsp) of salt has 2,300 mg of sodium  This may seem like a difficult goal, but small changes to the foods you eat can make a big difference  Your healthcare provider or dietitian can help you create a meal plan that follows your sodium limit  How to limit sodium:   · Read food labels  Food labels can help you choose foods that are low in sodium  The amount of sodium is listed in milligrams (mg)  The % Daily Value (DV) column tells you how much of your daily needs are met by 1 serving of the food for each nutrient listed  Choose foods that have less than 5% of the DV of sodium  These foods are considered low in sodium  Foods that have 20% or more of the DV of sodium are considered high in sodium  Avoid foods that have more than 300 mg of sodium in each serving  Choose foods that say low-sodium, reduced-sodium, or no salt added on the food label  · Avoid salt  Do not salt food at the table, and add very little salt to foods during cooking  Use herbs and spices, such as onions, garlic, and salt-free seasonings to add flavor to foods  Try lemon or lime juice or vinegar to give foods a tart flavor  Use hot peppers or a small amount of hot pepper sauce to add a spicy flavor to foods  · Ask about salt substitutes    Ask your healthcare provider if you may use salt substitutes  Some salt substitutes have ingredients that can be harmful if you have certain health conditions  · Choose foods carefully at restaurants  Meals from restaurants, especially fast food restaurants, are often high in sodium  Some restaurants have nutrition information that tells you the amount of sodium in their foods  Ask to have your food prepared with less, or no salt  What you need to know about fats:   · Include healthy fats  Examples are unsaturated fats and omega-3 fatty acids  Unsaturated fats are found in soybean, canola, olive, or sunflower oil, and liquid and soft tub margarines  Omega-3 fatty acids are found in fatty fish, such as salmon, tuna, mackerel, and sardines  It is also found in flaxseed oil and ground flaxseed  · Avoid unhealthy fats  Do not eat unhealthy fats, such as saturated fats and trans fats  Saturated fats are found in foods that contain fat from animals  Examples are fatty meats, whole milk, butter, cream, and other dairy foods  It is also found in shortening, stick margarine, palm oil, and coconut oil  Trans fats are found in fried foods, crackers, chips, and baked goods made with margarine or shortening  Foods to include: With the DASH eating plan, you need to eat a certain number of servings from each food group  This will help you get enough of certain nutrients and limit others  The amount of servings you should eat depends on how many calories you need  Your dietitian can tell you how many calories you need  The number of servings listed next to the food groups below are for people who need about 2,000 calories each day    · Grains:  6 to 8 servings (3 of these servings should be whole-grain foods)    ¨ 1 slice of whole-grain bread     ¨ 1 ounce of dry cereal    ¨ ½ cup of cooked cereal, pasta, or brown rice    · Vegetables and fruits:  4 to 5 servings of fruits and 4 to 5 servings of vegetables    ¨ 1 medium fruit    ¨ ½ cup of frozen, canned (no added salt), or chopped fresh vegetables     ¨ ½ cup of fresh, frozen, dried, or canned fruit (canned in light syrup or fruit juice)    ¨ ½ cup of vegetable or fruit juice    · Dairy:  2 to 3 servings    ¨ 1 cup of nonfat (skim) or 1% milk    ¨ 1½ ounces of fat-free or low-fat cheese    ¨ 6 ounces of nonfat or low-fat yogurt    · Lean meat, poultry, and fish:  6 ounces or less    [employer] (chicken, turkey) with no skin    ¨ Fish (especially fatty fish, such as salmon, fresh tuna, or mackerel)    ¨ Lean beef and pork (loin, round, extra lean hamburger)    ¨ Egg whites and egg substitutes    · Nuts, seeds, and legumes:  4 to 5 servings each week    ¨ ½ cup of cooked beans and peas    ¨ 1½ ounces of unsalted nuts    ¨ 2 tablespoons of peanut butter or seeds    · Sweets and added sugars:  5 or less each week    ¨ 1 tablespoon of sugar, jelly, or jam    ¨ ½ cup of sorbet or gelatin    ¨ 1 cup of lemonade    · Fats:  2 to 3 servings each week    ¨ 1 teaspoon of soft margarine or vegetable oil    ¨ 1 tablespoon of mayonnaise    ¨ 2 tablespoons of salad dressing  Foods to avoid:   · Grains:      [employer], such as doughnuts, pastries, cookies, and biscuits (high in fat and sugar)    ¨ Mixes for cornbread and biscuits, packaged foods, such as bread stuffing, rice and pasta mixes, macaroni and cheese, and instant cereals (high in sodium)    · Fruits and vegetables:      ¨ Regular, canned vegetables (high in sodium)    ¨ Sauerkraut, pickled vegetables, and other foods prepared in brine (high in sodium)    ¨ Fried vegetables or vegetables in butter or high-fat sauces    ¨ Fruit in cream or butter sauce (high in fat)    · Dairy:      ¨ Whole milk, 2% milk, and cream (high in fat)    ¨ Regular cheese and processed cheese (high in fat and sodium)    · Meats and protein foods:      ¨ Smoked or cured meat, such as corned beef, peters, ham, hot dogs, and sausage (high in fat and sodium)    ¨ Canned beans and canned meats or spreads, such as potted meats, sardines, anchovies, and imitation seafood (high in sodium)    ¨ Deli or lunch meats, such as bologna, ham, turkey, and roast beef (high in sodium)    ¨ High-fat meat (T-bone steak, regular hamburger, and ribs)    ¨ Whole eggs and egg yolks (high in fat)    · Other:      ¨ Seasonings made with salt, such as garlic salt, celery salt, onion salt, seasoned salt, meat tenderizers, and monosodium glutamate (MSG)    ¨ Miso soup and canned or dried soup mixes (high in sodium)    ¨ Regular soy sauce, barbecue sauce, teriyaki sauce, steak sauce, Worcestershire sauce, and most flavored vinegars (high in sodium)    ¨ Regular condiments, such as mustard, ketchup, and salad dressings (high in sodium)    ¨ Gravy and sauces, such as Jayden or cheese sauces (high in sodium and fat)    ¨ Drinks high in sugar, such as soda or fruit drinks    [employer] foods, such as salted chips, popcorn, pretzels, pork rinds, salted crackers, and salted nuts    ¨ Frozen foods, such as dinners, entrees, vegetables with sauces, and breaded meats (high in sodium)  Other guidelines to follow:   · Maintain a healthy weight  Your risk for heart disease is higher if you are overweight  Your healthcare provider may suggest that you lose weight if you are overweight  You can lose weight by eating fewer calories and foods that have added sugars and fat  The DASH meal plan can help you do this  Decrease calories by eating smaller portions at each meal and fewer snacks  Ask your healthcare provider for more information about how to lose weight  · Exercise regularly  Regular exercise can help you reach or maintain a healthy weight  Regular exercise can also help decrease your blood pressure and improve your cholesterol levels  Get 30 minutes or more of moderate exercise each day of the week  To lose weight, get at least 60 minutes of exercise  Talk to your healthcare provider about the best exercise program for you      · Limit alcohol  Women should limit alcohol to 1 drink a day  Men should limit alcohol to 2 drinks a day  A drink of alcohol is 12 ounces of beer, 5 ounces of wine, or 1½ ounces of liquor  © 2017 [address] [employer] [employer] Information is for End User's use only and may not be sold, redistributed or otherwise used for commercial purposes  All illustrations and images included in CareNotes® are the copyrighted property of [employer] , [employer]  or Braden Alvarado  The above information is an  only  It is not intended as medical advice for individual conditions or treatments  Talk to your doctor, nurse or pharmacist before following any medical regimen to see if it is safe and effective for you

## 2018-09-10 NOTE — ASSESSMENT & PLAN NOTE
The current medical regimen is effective;  continue present plan and medications  Referral to nephrology

## 2018-09-11 ENCOUNTER — TELEPHONE (OUTPATIENT)
Dept: FAMILY MEDICINE CLINIC | Facility: CLINIC | Age: 53
End: 2018-09-11

## 2018-09-27 ENCOUNTER — APPOINTMENT (OUTPATIENT)
Dept: LAB | Facility: CLINIC | Age: 53
End: 2018-09-27
Payer: COMMERCIAL

## 2018-09-27 DIAGNOSIS — I50.22 CHRONIC SYSTOLIC CHF (CONGESTIVE HEART FAILURE) (HCC): ICD-10-CM

## 2018-09-27 DIAGNOSIS — I42.8 OTHER CARDIOMYOPATHY (HCC): ICD-10-CM

## 2018-09-27 DIAGNOSIS — I13.0 BENIGN HYPERTENSIVE HEART AND CKD, STAGE 3 (GFR 30-59), W CHF (HCC): Chronic | ICD-10-CM

## 2018-09-27 DIAGNOSIS — N18.30 BENIGN HYPERTENSIVE HEART AND CKD, STAGE 3 (GFR 30-59), W CHF (HCC): Chronic | ICD-10-CM

## 2018-09-27 LAB
ANION GAP SERPL CALCULATED.3IONS-SCNC: 7 MMOL/L (ref 4–13)
BUN SERPL-MCNC: 20 MG/DL (ref 5–25)
CALCIUM SERPL-MCNC: 9.5 MG/DL (ref 8.3–10.1)
CHLORIDE SERPL-SCNC: 104 MMOL/L (ref 100–108)
CO2 SERPL-SCNC: 26 MMOL/L (ref 21–32)
CREAT SERPL-MCNC: 1.18 MG/DL (ref 0.6–1.3)
GFR SERPL CREATININE-BSD FRML MDRD: 52 ML/MIN/1.73SQ M
GLUCOSE P FAST SERPL-MCNC: 105 MG/DL (ref 65–99)
NT-PROBNP SERPL-MCNC: 752 PG/ML
POTASSIUM SERPL-SCNC: 4 MMOL/L (ref 3.5–5.3)
SODIUM SERPL-SCNC: 137 MMOL/L (ref 136–145)

## 2018-09-27 PROCEDURE — 83880 ASSAY OF NATRIURETIC PEPTIDE: CPT

## 2018-09-27 PROCEDURE — 80048 BASIC METABOLIC PNL TOTAL CA: CPT

## 2018-09-27 PROCEDURE — 36415 COLL VENOUS BLD VENIPUNCTURE: CPT

## 2018-10-05 ENCOUNTER — OFFICE VISIT (OUTPATIENT)
Dept: FAMILY MEDICINE CLINIC | Facility: CLINIC | Age: 53
End: 2018-10-05
Payer: COMMERCIAL

## 2018-10-05 VITALS
RESPIRATION RATE: 16 BRPM | DIASTOLIC BLOOD PRESSURE: 70 MMHG | SYSTOLIC BLOOD PRESSURE: 124 MMHG | OXYGEN SATURATION: 98 % | WEIGHT: 215 LBS | HEART RATE: 68 BPM | HEIGHT: 70 IN | TEMPERATURE: 97.6 F | BODY MASS INDEX: 30.78 KG/M2

## 2018-10-05 DIAGNOSIS — I13.0 BENIGN HYPERTENSIVE HEART AND CKD, STAGE 3 (GFR 30-59), W CHF (HCC): Chronic | ICD-10-CM

## 2018-10-05 DIAGNOSIS — Z23 ENCOUNTER FOR IMMUNIZATION: ICD-10-CM

## 2018-10-05 DIAGNOSIS — N18.30 BENIGN HYPERTENSIVE HEART AND CKD, STAGE 3 (GFR 30-59), W CHF (HCC): Chronic | ICD-10-CM

## 2018-10-05 DIAGNOSIS — I48.0 PAROXYSMAL ATRIAL FIBRILLATION (HCC): ICD-10-CM

## 2018-10-05 DIAGNOSIS — I50.22 CHRONIC SYSTOLIC CHF (CONGESTIVE HEART FAILURE) (HCC): Primary | ICD-10-CM

## 2018-10-05 PROBLEM — I48.19 PERSISTENT ATRIAL FIBRILLATION (HCC): Status: ACTIVE | Noted: 2018-09-28

## 2018-10-05 PROCEDURE — 99214 OFFICE O/P EST MOD 30 MIN: CPT | Performed by: FAMILY MEDICINE

## 2018-10-05 PROCEDURE — 90471 IMMUNIZATION ADMIN: CPT

## 2018-10-05 PROCEDURE — 90682 RIV4 VACC RECOMBINANT DNA IM: CPT

## 2018-10-06 NOTE — PROGRESS NOTES
Assessment/Plan:    1  Patient will continue her current heart failure, cardiomyopathy, AFib, hypertension medications  Recommended that she continue to have her basic metabolic panel monitored and follow up with Nephrology due to recent onset of CKD  In addition, she will follow up with her cardiologist   2  High dose flu vaccine was given today  3  Patient will establish with a new primary care provider in 1 month  4  [place] handicap parking placard form completed and given active patient today  Problem List Items Addressed This Visit     Paroxysmal atrial fibrillation ([facility] )    Relevant Orders    Basic metabolic panel    NT-BNP PRO    Chronic systolic CHF (congestive heart failure) ([facility] ) - Primary    Relevant Orders    Basic metabolic panel    NT-BNP PRO    Benign hypertensive heart and CKD, stage 3 (GFR 30-59), w CHF ([facility] ) (Chronic)    Relevant Orders    Basic metabolic panel    NT-BNP PRO      Other Visit Diagnoses     Encounter for immunization        Relevant Orders    influenza vaccine, [id number], quadrivalent, recombinant, PF, 0 5 mL, for patients 18 yr+ ([facility])    influenza vaccine, [id number], quadrivalent, recombinant, PF, 0 5 mL, for patients 18 yr+ ([facility]) (Completed)            Subjective:      Patient ID: Kole Weldon is a 52 y o  female  68-year-old female with past medical history of congestive heart failure, cardiomyopathy, atrial fibrillation, hypertensive heart disease with CKD, psoriasis with significant arthropathy, anxiety and depression presents today for follow-up of her chronic conditions  She continues to experience dyspnea on exertion and class 3 heart failure symptoms  In addition, she is struggling with her arthropathy from psoriasis  She does have follow-up with her specialists  She has been compliant with medications, diet, and low intensity exercise as tolerated    Currently she denies any headaches, vision changes, dizziness, chest pain, palpitations, nausea, abdominal pain, bowel changes  Congestive Heart Failure   Presents for follow-up visit  Associated symptoms include fatigue and shortness of breath  Pertinent negatives include no abdominal pain, chest pain, edema, near-syncope, orthopnea, palpitations, paroxysmal nocturnal dyspnea or unexpected weight change  The symptoms have been stable  Compliance with total regimen is %  Compliance problems include adherence to diet and adherence to exercise  Compliance with diet is %  Compliance with exercise is %  Compliance with medications is %  Side effects of treatment include headaches and urinary  Hypertension   This is a chronic problem  The current episode started more than 1 year ago  The problem is controlled  Associated symptoms include anxiety and shortness of breath  Pertinent negatives include no chest pain, headaches or palpitations  There are no associated agents to hypertension  Risk factors for coronary artery disease include dyslipidemia, family history, post-menopausal state, smoking/tobacco exposure and stress  There are no compliance problems  Hypertensive end-organ damage includes CAD/MI and heart failure  cardiomyopathy  Anxiety   Symptoms include shortness of breath  Patient reports no chest pain, dizziness or palpitations  The following portions of the patient's history were reviewed and updated as appropriate: allergies, current medications, past family history, past medical history, past social history, past surgical history and problem list     Review of Systems   Constitutional: Positive for fatigue  Negative for appetite change and unexpected weight change  Eyes: Negative for visual disturbance  Respiratory: Positive for shortness of breath  Cardiovascular: Negative for chest pain, palpitations and near-syncope  Gastrointestinal: Negative for abdominal pain  Genitourinary: Negative for frequency     Neurological: Negative for dizziness and headaches  Objective:      /70   Pulse 68   Temp 97 6 °F (36 4 °C) (Tympanic)   Resp 16   Ht 5' 10" (1 778 m)   Wt 97 5 kg (215 lb)   SpO2 98%   BMI 30 85 kg/m²          Physical Exam   Constitutional: She appears well-developed and well-nourished  She is cooperative  No distress  HENT:   Head: Normocephalic and atraumatic  Eyes: Conjunctivae are normal  No scleral icterus  Neck: Normal range of motion  Neck supple  No thyromegaly present  Cardiovascular: Normal rate, regular rhythm, normal heart sounds and intact distal pulses  No murmur heard  Pulmonary/Chest: Effort normal and breath sounds normal  No respiratory distress  She has no wheezes  She has no rales  Musculoskeletal: She exhibits no edema  Neurological: She is alert  Skin: She is not diaphoretic  Psychiatric: She has a normal mood and affect   Her behavior is normal  Judgment and thought content normal

## 2018-10-06 NOTE — PATIENT INSTRUCTIONS
1  Patient will continue her current heart failure, cardiomyopathy, AFib, hypertension medications  Recommended that she continue to have her basic metabolic panel monitored and follow up with Nephrology due to recent onset of CKD  In addition, she will follow up with her cardiologist   2  High dose flu vaccine was given today  3  Patient will establish with a new primary care provider in 1 month  4  [place] handicap parking placard form completed and given active patient today

## 2018-10-11 ENCOUNTER — TELEPHONE (OUTPATIENT)
Dept: FAMILY MEDICINE CLINIC | Facility: CLINIC | Age: 53
End: 2018-10-11

## 2018-10-11 NOTE — TELEPHONE ENCOUNTER
Patient left a voice message regarding her disability claim  Please call her to advise [phone]

## 2018-10-12 ENCOUNTER — TELEPHONE (OUTPATIENT)
Dept: FAMILY MEDICINE CLINIC | Facility: CLINIC | Age: 53
End: 2018-10-12

## 2018-10-17 ENCOUNTER — TELEPHONE (OUTPATIENT)
Dept: FAMILY MEDICINE CLINIC | Facility: CLINIC | Age: 53
End: 2018-10-17

## 2018-10-17 NOTE — TELEPHONE ENCOUNTER
I spoke with pt and she needed just the office notes faxed to [facility], they did not receive the office notes from 10/5/18  Faxed notes to [phone]  Claim # [id number]    Confirmation received and scanned into chart, last office notes mailed to pt as well home address

## 2018-10-22 ENCOUNTER — TELEPHONE (OUTPATIENT)
Dept: FAMILY MEDICINE CLINIC | Facility: CLINIC | Age: 53
End: 2018-10-22

## 2018-10-22 NOTE — TELEPHONE ENCOUNTER
[facility] called and requested the patient's records for her appt on 11/6/18   Please fax the records over to [phone]

## 2018-10-23 PROCEDURE — 90662 IIV NO PRSV INCREASED AG IM: CPT

## 2018-10-23 PROCEDURE — 90471 IMMUNIZATION ADMIN: CPT

## 2018-10-24 ENCOUNTER — TELEPHONE (OUTPATIENT)
Dept: FAMILY MEDICINE CLINIC | Facility: CLINIC | Age: 53
End: 2018-10-24

## 2018-10-24 NOTE — TELEPHONE ENCOUNTER
I called pt she does not need that form filled out because we already completed that, she needs actual written letter to submit to ask for extension      Thanls

## 2018-10-24 NOTE — TELEPHONE ENCOUNTER
Yobany Rater called and stated that she needs Dr Velasco Mom do a note for her work stating her condition and how long she will be out for(approximately)  She asked for a least 3 months extension on her FMLA until has her first visit with Dr Lizarraga Dus  She said that she needs to have this done by 10/26/2018  Her Employee number is [phone] and it can be fax to [phone]    Any other questions the patient can be reached at [phone]

## 2018-10-24 NOTE — LETTER
October 24, 2018     [address] [place], [place] [id number]    Patient: Norman Espinoza   YOB: 1965   Date of Visit: 10/24/2018       To whom it May concern:    Woody Rosario has the following chronic disabling conditions:  Patient Active Problem List   Diagnosis    Systolic heart failure ([facility] )    Pure hypercholesterolemia    Psoriasis    Peripheral vascular disease ([facility]    Paroxysmal atrial fibrillation ([facility]    Allergy    Benign hypertension    CAD (coronary artery disease)    Cardiomyopathy ([facility]    Chronic systolic CHF (congestive heart failure) ([facility] )    Hypertension    Other emphysema ([facility] )    Benign hypertensive heart and CKD, stage 3 (GFR 30-59), w CHF ([facility] )    Attention deficit disorder (ADD) without hyperactivity    Persistent atrial fibrillation ([facility] )     Due to the fact there has not been significant improvement in her health conditions, I am recommending that she extend her medical leave an additional 3 months  If you have questions, please do not hesitate to call me       Sincerely,        Estela Salgado MD

## 2018-10-24 NOTE — TELEPHONE ENCOUNTER
please obtain the necessary paperwork and complete per her previous forms that I have done  Just need to put it a work extension for 3 months

## 2018-10-25 NOTE — TELEPHONE ENCOUNTER
Letter printed, confirmation received, and pt made aware “You can access the FollowHealth Patient Portal, offered by [facility], by registering with the following website: [email or web address]”

## 2018-10-30 ENCOUNTER — TELEPHONE (OUTPATIENT)
Dept: FAMILY MEDICINE CLINIC | Facility: CLINIC | Age: 53
End: 2018-10-30

## 2018-10-30 NOTE — TELEPHONE ENCOUNTER
Wilfredo Baiely from [facility] called and stated that their office has requested the patient's records 3 times for an upcoming visit  They would like to know when they expect the records-her appt is on 11/06/2018  Their office fax [phone] and their phone is [phone]

## 2018-11-09 ENCOUNTER — OFFICE VISIT (OUTPATIENT)
Dept: FAMILY MEDICINE CLINIC | Facility: CLINIC | Age: 53
End: 2018-11-09
Payer: COMMERCIAL

## 2018-11-09 VITALS
BODY MASS INDEX: 29.98 KG/M2 | WEIGHT: 209.4 LBS | HEIGHT: 70 IN | RESPIRATION RATE: 16 BRPM | DIASTOLIC BLOOD PRESSURE: 60 MMHG | OXYGEN SATURATION: 99 % | HEART RATE: 108 BPM | SYSTOLIC BLOOD PRESSURE: 98 MMHG | TEMPERATURE: 98.1 F

## 2018-11-09 DIAGNOSIS — Z79.01 CURRENT USE OF LONG TERM ANTICOAGULATION: ICD-10-CM

## 2018-11-09 DIAGNOSIS — Z86.79 S/P ABLATION OF ATRIAL FIBRILLATION: ICD-10-CM

## 2018-11-09 DIAGNOSIS — I48.0 PAROXYSMAL ATRIAL FIBRILLATION (HCC): ICD-10-CM

## 2018-11-09 DIAGNOSIS — N18.30 BENIGN HYPERTENSIVE HEART AND CKD, STAGE 3 (GFR 30-59), W CHF (HCC): ICD-10-CM

## 2018-11-09 DIAGNOSIS — F41.0 GENERALIZED ANXIETY DISORDER WITH PANIC ATTACKS: ICD-10-CM

## 2018-11-09 DIAGNOSIS — F41.1 GENERALIZED ANXIETY DISORDER WITH PANIC ATTACKS: ICD-10-CM

## 2018-11-09 DIAGNOSIS — J43.9 PULMONARY EMPHYSEMA, UNSPECIFIED EMPHYSEMA TYPE (HCC): Primary | ICD-10-CM

## 2018-11-09 DIAGNOSIS — I50.22 CHRONIC SYSTOLIC CHF (CONGESTIVE HEART FAILURE) (HCC): ICD-10-CM

## 2018-11-09 DIAGNOSIS — Z98.890 S/P ABLATION OF ATRIAL FIBRILLATION: ICD-10-CM

## 2018-11-09 DIAGNOSIS — I13.0 BENIGN HYPERTENSIVE HEART AND CKD, STAGE 3 (GFR 30-59), W CHF (HCC): ICD-10-CM

## 2018-11-09 PROCEDURE — 3008F BODY MASS INDEX DOCD: CPT | Performed by: INTERNAL MEDICINE

## 2018-11-09 PROCEDURE — 99214 OFFICE O/P EST MOD 30 MIN: CPT | Performed by: INTERNAL MEDICINE

## 2018-11-09 PROCEDURE — 1036F TOBACCO NON-USER: CPT | Performed by: INTERNAL MEDICINE

## 2018-11-09 RX ORDER — DOFETILIDE 0.25 MG/1
CAPSULE ORAL
COMMUNITY
Start: 2018-10-11

## 2018-11-09 RX ORDER — ACETAMINOPHEN 500 MG
500 TABLET ORAL EVERY 6 HOURS PRN
COMMUNITY

## 2018-11-10 PROBLEM — F41.0 GENERALIZED ANXIETY DISORDER WITH PANIC ATTACKS: Status: ACTIVE | Noted: 2018-11-10

## 2018-11-10 PROBLEM — Z98.890 S/P ABLATION OF ATRIAL FIBRILLATION: Status: ACTIVE | Noted: 2018-11-10

## 2018-11-10 PROBLEM — Z86.79 S/P ABLATION OF ATRIAL FIBRILLATION: Status: ACTIVE | Noted: 2018-11-10

## 2018-11-10 PROBLEM — F41.1 GENERALIZED ANXIETY DISORDER WITH PANIC ATTACKS: Status: ACTIVE | Noted: 2018-11-10

## 2018-11-10 PROBLEM — Z79.01 CURRENT USE OF LONG TERM ANTICOAGULATION: Status: ACTIVE | Noted: 2018-11-10

## 2018-11-10 NOTE — PROGRESS NOTES
Assessment/Plan:  Patient's main complaint at this point discontinued exertional shortness of breath  This is certainly complicated by her generalized anxiety disorder and attention deficit disorder  She does not have atrial fibrillation at this time following her ablation and treatment with tikosyn  She is currently on ProAir as a rescue inhaler and says she has had difficulties with umecl containing inhalers  She is on at a very low dose of 25 mg of metoprolol daily which appears necessary to control her arrhythmia  Perhaps inhaled steroid inhaler will be indicated  I have ordered pre and post bronchodilator pulmonary function testing prior to the next visit and will review that with her and discuss further treatment  Diet reviewed  Lifestyle modifications reviewed  Medications reviewed and ordered  Laboratory tests and studies reviewed and ordered  All patient's questions answered to patient satisfaction  Diagnoses and all orders for this visit:    Pulmonary emphysema, unspecified emphysema type ([facility] )  -     Complete PFT with Post Bronchodilator and ABG; Future    Paroxysmal atrial fibrillation (HCC)    Chronic systolic CHF (congestive heart failure) ([facility])    Benign hypertensive heart and CKD, stage 3 (GFR 30-59), w CHF ([facility] )    Current use of long term anticoagulation    S/P ablation of atrial fibrillation    Generalized anxiety disorder with panic attacks    Other orders  -     PROAIR  (90 Base) MCG/ACT inhaler;   -     acetaminophen (TYLENOL) 500 mg tablet; Take 500 mg by mouth every 6 (six) hours as needed  -     dofetilide (TIKOSYN) 250 mcg capsule; TAKE 1 CAPSULE BY MOUTH TWICE DAILY        Subjective:      Patient ID: Cal Jim is a 52 y o  female  HPI  This 52-year-old female has a complex medical history and her care has been transferred from Dr Allison Jha to my practice  At this point her major complaint is shortness of breath with exertion    She does have a history of emphysema and smoked in the past she no longer smokes  She had an ablation for persistent atrial fibrillation and is now on Tikosyn and a low-dose of metoprolol 25 mg of the succinate daily  She does not tolerateumeclid containing inhalers with bronchodilators that are included  But she does tolerate ProAir as a rescue inhaler quite well  Some wondering if she would tolerate umeclid alone  She has not been on appear steroid inhaler either  Past pulmonary function testing is not available at this time and has not been done for least 2 years  She also complains of cold distal extremities hands and feet and says her hands and feet term below as well as her lips but this does not occur when exposed to cold just seems to occur randomly  She complains of numbness of the bilateral feet which she wants to talk to me more about at the next visit      Current Outpatient Prescriptions:     acetaminophen (TYLENOL) 500 mg tablet, Take 500 mg by mouth every 6 (six) hours, Disp: , Rfl:     acetaminophen (TYLENOL) 500 mg tablet, Take 500 mg by mouth every 6 (six) hours as needed, Disp: , Rfl:     Aspirin (ASPIR-81 PO), every 24 hours, Disp: , Rfl:     dofetilide (TIKOSYN) 250 mcg capsule, TAKE 1 CAPSULE BY MOUTH TWICE DAILY, Disp: , Rfl:     furosemide (LASIX) 40 mg tablet, Take 1 tablet by mouth see administration instructions 1 tablet on Mon, Wed, Fri 1/2 tablet on Tues, Thurs, Sat, Sun , Disp: , Rfl:     lisinopril (ZESTRIL) 10 mg tablet, Take 5 mg by mouth daily , Disp: , Rfl:     metoprolol succinate (TOPROL-XL) 25 mg 24 hr tablet, Take 25 mg by mouth 2 (two) times a day , Disp: , Rfl:     Multiple Vitamins-Minerals (MULTIVITAMIN ADULT PO), Take 2 tablets by mouth, Disp: , Rfl:     OTEZLA 30 MG TABS, Take by mouth 2 (two) times a day  , Disp: , Rfl:     PROAIR  (90 Base) MCG/ACT inhaler, , Disp: , Rfl:     rosuvastatin (CRESTOR) 20 MG tablet, Take 20 mg by mouth, Disp: , Rfl:     XARELTO 20 MG tablet, , Disp: , Rfl:     The following portions of the patient's history were reviewed and updated as appropriate: allergies, current medications, past family history, past medical history, past social history, past surgical history and problem list     Review of Systems   Constitutional: Negative for appetite change, fatigue, fever and unexpected weight change  HENT: Negative for rhinorrhea, sinus pain, sinus pressure, sneezing and sore throat  Eyes: Negative for visual disturbance  Respiratory: Positive for shortness of breath  Negative for cough, chest tightness and wheezing  Cardiovascular: Negative for chest pain, palpitations and leg swelling  Gastrointestinal: Negative for abdominal distention, abdominal pain, blood in stool, constipation, diarrhea, nausea and vomiting  Endocrine: Negative for polydipsia and polyuria  Genitourinary: Negative for decreased urine volume, difficulty urinating, dysuria, hematuria and urgency  Musculoskeletal: Negative for arthralgias, back pain, joint swelling and neck pain  Skin: Negative for rash  Allergic/Immunologic: Negative for environmental allergies  Neurological: Negative for tremors, weakness, light-headedness, numbness and headaches  Hematological: Does not bruise/bleed easily  Psychiatric/Behavioral: Negative for agitation, behavioral problems, confusion and dysphoric mood  The patient is not nervous/anxious            Family History   Problem Relation Age of Onset    Osteoporosis Mother     Stroke Sister     Atrial fibrillation Sister     Cancer Brother        Past Medical History:   Diagnosis Date    Allergic     Anxiety     At[facility] fibrillation ([facility]     Benign hypertensive heart and CKD, stage 3 (GFR 30-59), w CHF ([facility] ) 9/5/2018    Depression     [facility]     Hypertension     SOB (shortness of breath)     chronic       Past Surgical History:   Procedure Laterality Date    ANGIOPLASTY Bilateral     stent placement behind knee    KNEE SURGERY Left 1980    NECK SURGERY  2006    plate (C7)       Social History     Social History    Marital status: Single     Spouse name: N/A    Number of children: N/A    Years of education: N/A     Occupational History    disability      Social History Main Topics    Smoking status: Former Smoker     Packs/day: 0 75     Years: 7 00     Types: Cigarettes     Quit date: 2016    Smokeless tobacco: Current User      Comment: no passive smoke exposure    Alcohol use No    Drug use: No    Sexual activity: Yes     Partners: Male     Other Topics Concern    None     Social History Narrative    None       Allergies   Allergen Reactions    Albuterol      Afib  Afib  Afib    Aspirin     Umeclidinium-Vilanterol      Afib  Afib  Afib         Objective:      BP 98/60 (BP Location: Right arm, Patient Position: Sitting, Cuff Size: Large)   Pulse (!) 108   Temp 98 1 °F (36 7 °C) (Tympanic)   Resp 16   Ht 5' 10" (1 778 m)   Wt 95 kg (209 lb 6 4 oz)   SpO2 99%   BMI 30 05 kg/m²        Physical Exam   Constitutional: She is oriented to person, place, and time  She appears well-developed and well-nourished  No distress  HENT:   Head: Normocephalic and atraumatic  Nose: Nose normal    Mouth/Throat: Oropharynx is clear and moist  No oropharyngeal exudate  Eyes: Pupils are equal, round, and reactive to light  Conjunctivae and EOM are normal  No scleral icterus  Neck: Normal range of motion  Neck supple  No JVD present  No tracheal deviation present  No thyromegaly present  Cardiovascular: Normal rate, regular rhythm and normal heart sounds  Exam reveals no gallop and no friction rub  No murmur heard  Pulmonary/Chest: Effort normal  No respiratory distress  She has no wheezes  She has no rales  She exhibits no tenderness  Abdominal: Soft  Bowel sounds are normal  She exhibits no distension and no mass  There is no tenderness  There is no rebound and no guarding     Musculoskeletal: Normal range of motion  She exhibits no edema or deformity  Lymphadenopathy:     She has no cervical adenopathy  Neurological: She is alert and oriented to person, place, and time  No cranial nerve deficit  Coordination normal    Skin: Skin is warm and dry  No rash noted  Psychiatric: She has a normal mood and affect   Her behavior is normal  Judgment and thought content normal

## 2018-11-26 ENCOUNTER — TELEPHONE (OUTPATIENT)
Dept: GYNECOLOGY | Facility: CLINIC | Age: 53
End: 2018-11-26

## 2018-11-26 NOTE — TELEPHONE ENCOUNTER
Message left for Pt that we received a Capabilities and Limitations form from [employer]olly [employer]burn  I explained to Pt that we do not fill these types of forms out and she must f/u with [facility] to fill these types of forms out  Per Virginia Zambrano who used to work with Pt and Dr Vitaly Santos, this was protocol when she was seeing Dr Vitaly Santos as well

## 2018-12-07 ENCOUNTER — OFFICE VISIT (OUTPATIENT)
Dept: FAMILY MEDICINE CLINIC | Facility: CLINIC | Age: 53
End: 2018-12-07
Payer: COMMERCIAL

## 2018-12-07 ENCOUNTER — TELEPHONE (OUTPATIENT)
Dept: FAMILY MEDICINE CLINIC | Facility: CLINIC | Age: 53
End: 2018-12-07

## 2018-12-07 VITALS
HEART RATE: 76 BPM | HEIGHT: 70 IN | DIASTOLIC BLOOD PRESSURE: 80 MMHG | WEIGHT: 213.2 LBS | OXYGEN SATURATION: 99 % | SYSTOLIC BLOOD PRESSURE: 120 MMHG | RESPIRATION RATE: 16 BRPM | BODY MASS INDEX: 30.52 KG/M2 | TEMPERATURE: 97.3 F

## 2018-12-07 DIAGNOSIS — Z98.890 S/P ABLATION OF ATRIAL FIBRILLATION: ICD-10-CM

## 2018-12-07 DIAGNOSIS — I42.8 OTHER CARDIOMYOPATHY (HCC): Primary | ICD-10-CM

## 2018-12-07 DIAGNOSIS — I13.0 BENIGN HYPERTENSIVE HEART AND CKD, STAGE 3 (GFR 30-59), W CHF (HCC): ICD-10-CM

## 2018-12-07 DIAGNOSIS — I48.19 PERSISTENT ATRIAL FIBRILLATION (HCC): ICD-10-CM

## 2018-12-07 DIAGNOSIS — I48.0 PAROXYSMAL ATRIAL FIBRILLATION (HCC): ICD-10-CM

## 2018-12-07 DIAGNOSIS — F41.1 GENERALIZED ANXIETY DISORDER WITH PANIC ATTACKS: ICD-10-CM

## 2018-12-07 DIAGNOSIS — Z79.01 CURRENT USE OF LONG TERM ANTICOAGULATION: ICD-10-CM

## 2018-12-07 DIAGNOSIS — N18.30 BENIGN HYPERTENSIVE HEART AND CKD, STAGE 3 (GFR 30-59), W CHF (HCC): ICD-10-CM

## 2018-12-07 DIAGNOSIS — J43.8 OTHER EMPHYSEMA (HCC): ICD-10-CM

## 2018-12-07 DIAGNOSIS — Z86.79 S/P ABLATION OF ATRIAL FIBRILLATION: ICD-10-CM

## 2018-12-07 DIAGNOSIS — I73.00 RAYNAUD'S DISEASE WITHOUT GANGRENE: ICD-10-CM

## 2018-12-07 DIAGNOSIS — F41.0 GENERALIZED ANXIETY DISORDER WITH PANIC ATTACKS: ICD-10-CM

## 2018-12-07 PROCEDURE — 3008F BODY MASS INDEX DOCD: CPT | Performed by: INTERNAL MEDICINE

## 2018-12-07 PROCEDURE — 99214 OFFICE O/P EST MOD 30 MIN: CPT | Performed by: INTERNAL MEDICINE

## 2018-12-07 NOTE — PROGRESS NOTES
Assessment/Plan: This patient is here today to complete a disability functional evaluation form  I completed the form with the patient's participation and signed the forms  I reviewed the patient's history letters from pulmonary and cardiac specialists and place that information in the form to the best of my ability  I communicated this information with the patient  I asked the patient to see her pulmonary and cardiac specialists and to have them support her disability in writing and record the diagnoses that support her disability clearly in their notes  Copy of these forms  in her chart  Diet reviewed  Lifestyle modifications reviewed  Medications reviewed and ordered  Laboratory tests and studies reviewed and ordered  All patient's questions answered to patient satisfaction  Diagnoses and all orders for this visit:    Other cardiomyopathy ([facility] )    Persistent atrial fibrillation ([facility] )    Benign hypertensive heart and CKD, stage 3 (GFR 30-59), w CHF ([facility] )    Other emphysema ([facility] )    Raynaud's disease without gangrene    Paroxysmal atrial fibrillation (HCC)    S/P ablation of atrial fibrillation    Generalized anxiety disorder with panic attacks    Current use of long term anticoagulation        Subjective:      Patient ID: Tien Foreman is a 52 y o  female  HPI  This 57-year-old female is here to update disability forms completed for work  Chart was reviewed extensively with the patient and the forms were completed with the patient and are available on the chart      Current Outpatient Prescriptions:     acetaminophen (TYLENOL) 500 mg tablet, Take 500 mg by mouth every 6 (six) hours as needed, Disp: , Rfl:     Aspirin (ASPIR-81 PO), every 24 hours, Disp: , Rfl:     dofetilide (TIKOSYN) 250 mcg capsule, TAKE 1 CAPSULE BY MOUTH TWICE DAILY, Disp: , Rfl:     furosemide (LASIX) 40 mg tablet, Take 1 tablet by mouth see administration instructions 1 tablet on Mon, Wed, Fri 1/2 tablet on Tues, Thurs, Sat, Sun , Disp: , Rfl:     lisinopril (ZESTRIL) 10 mg tablet, Take 5 mg by mouth daily , Disp: , Rfl:     metoprolol succinate (TOPROL-XL) 25 mg 24 hr tablet, Take 25 mg by mouth 2 (two) times a day , Disp: , Rfl:     Multiple Vitamins-Minerals (MULTIVITAMIN ADULT PO), Take 2 tablets by mouth, Disp: , Rfl:     PROAIR  (90 Base) MCG/ACT inhaler, , Disp: , Rfl:     rosuvastatin (CRESTOR) 20 MG tablet, Take 20 mg by mouth, Disp: , Rfl:     XARELTO 20 MG tablet, , Disp: , Rfl:     The following portions of the patient's history were reviewed and updated as appropriate: allergies, current medications, past family history, past medical history, past social history, past surgical history and problem list     Review of Systems   Respiratory: Positive for shortness of breath            Family History   Problem Relation Age of Onset    Osteoporosis Mother     Stroke Sister     Atrial fibrillation Sister     Cancer Brother        Past Medical History:   Diagnosis Date    Allergic     Anxiety     Atrial fibrillation (HCC)     Benign hypertensive heart and CKD, stage 3 (GFR 30-59), w CHF ([facility] ) 9/5/2018    Depression     Heart attack ([facility] )     Hypertension     SOB (shortness of breath)     chronic       Past Surgical History:   Procedure Laterality Date    ABLATION COLPOCLESIS      ANGIOPLASTY Bilateral     stent placement behind knee    HYSTERECTOMY      KNEE SURGERY Left 1980    NECK SURGERY  2006    plate (C7)       Social History     Social History    Marital status: Single     Spouse name: N/A    Number of children: N/A    Years of education: N/A     Occupational History    disability      Social History Main Topics    Smoking status: Former Smoker     Packs/day: 0 75     Years: 7 00     Types: Cigarettes     Quit date: 2016    Smokeless tobacco: Never Used      Comment: no passive smoke exposure    Alcohol use No    Drug use: No    Sexual activity: Yes     Partners: Male     Other Topics Concern    None     Social History Narrative    None       Allergies   Allergen Reactions    Albuterol      Afib  Afib  Afib    Aspirin     Umeclidinium-Vilanterol      Afib  Afib  Afib         Objective:      /80 (BP Location: Right arm, Patient Position: Sitting, Cuff Size: Large)   Pulse 76   Temp (!) 97 3 °F (36 3 °C) (Tympanic)   Resp 16   Ht 5' 10" (1 778 m)   Wt 96 7 kg (213 lb 3 2 oz)   SpO2 99%   BMI 30 59 kg/m²        Physical Exam   Constitutional: She is oriented to person, place, and time  She appears well-developed and well-nourished  No distress  HENT:   Head: Normocephalic and atraumatic  Nose: Nose normal    Mouth/Throat: Oropharynx is clear and moist  No oropharyngeal exudate  Eyes: Pupils are equal, round, and reactive to light  Conjunctivae and EOM are normal  No scleral icterus  Neck: Normal range of motion  Neck supple  No JVD present  No tracheal deviation present  No thyromegaly present  Cardiovascular: Normal rate, regular rhythm and normal heart sounds  Exam reveals no gallop and no friction rub  No murmur heard  Pulmonary/Chest: Effort normal  No respiratory distress  She has no wheezes  She has no rales  She exhibits no tenderness  Abdominal: Soft  Bowel sounds are normal  She exhibits no distension and no mass  There is no tenderness  There is no rebound and no guarding  Musculoskeletal: Normal range of motion  She exhibits no edema or deformity  Lymphadenopathy:     She has no cervical adenopathy  Neurological: She is alert and oriented to person, place, and time  No cranial nerve deficit  Coordination normal    Skin: Skin is warm and dry  No rash noted  Psychiatric: She has a normal mood and affect   Her behavior is normal  Judgment and thought content normal

## 2018-12-07 NOTE — TELEPHONE ENCOUNTER
This was not a phone call, but when patient was leaving the office she was crying at the desk hysterical  I tried to calm her down, asked her what was wrong, I tried to get the patient to go into the room but she would not move from the check out area  Patient states " she is very upset, treated poorly by the doctor, he was demeaning to her, condescending, talking down to her, not believing what she has to say"  She states " Dr Andrew Troy has completed this form for years for her, no reason this dr could not complete it as he has all her records"  I offered her apt with another physician that would be another fit rather then her not coming back  Patient accepted and I made apt with Dr Donaldo Yanez  Patient also handed me forms to be faxed that Dr Girish Tierney signed for [facility], note page 3 of 6 ( I scanned forms into media section of the chart) also placed hard copy on Melind's desk for review  He notes " I do not trust the patient" yet hands her the forms to give to checkout  Patient has numerous conditions and sees multiple specialist  Dx: Cardiomyopathy, COPD, SOB, Afib,Tachycardia, Anxiety, etc     Dr Andrew Troy in the past has completed this form for her many times  Can you please call the patient and handle this for her? If you have any questions present for this episode was myself, Tamiko Mayer

## 2018-12-10 ENCOUNTER — HOSPITAL ENCOUNTER (OUTPATIENT)
Dept: PULMONOLOGY | Facility: HOSPITAL | Age: 53
Discharge: HOME | End: 2018-12-10
Payer: COMMERCIAL

## 2018-12-10 ENCOUNTER — TRANSCRIBE ORDERS (OUTPATIENT)
Dept: PULMONOLOGY | Facility: HOSPITAL | Age: 53
End: 2018-12-10

## 2018-12-10 DIAGNOSIS — J43.9 PULMONARY EMPHYSEMA, UNSPECIFIED EMPHYSEMA TYPE (HCC): ICD-10-CM

## 2018-12-10 DIAGNOSIS — J43.9 PULMONARY EMPHYSEMA, UNSPECIFIED EMPHYSEMA TYPE (HCC): Primary | ICD-10-CM

## 2018-12-10 PROCEDURE — 94729 DIFFUSING CAPACITY: CPT

## 2018-12-10 PROCEDURE — 94060 EVALUATION OF WHEEZING: CPT

## 2018-12-10 PROCEDURE — 94727 GAS DIL/WSHOT DETER LNG VOL: CPT

## 2018-12-10 PROCEDURE — 94060 EVALUATION OF WHEEZING: CPT | Performed by: INTERNAL MEDICINE

## 2018-12-10 PROCEDURE — 94726 PLETHYSMOGRAPHY LUNG VOLUMES: CPT | Performed by: INTERNAL MEDICINE

## 2018-12-10 PROCEDURE — 94760 N-INVAS EAR/PLS OXIMETRY 1: CPT

## 2018-12-10 PROCEDURE — 94729 DIFFUSING CAPACITY: CPT | Performed by: INTERNAL MEDICINE

## 2018-12-10 RX ORDER — ALBUTEROL SULFATE 2.5 MG/3ML
2.5 SOLUTION RESPIRATORY (INHALATION) ONCE AS NEEDED
Status: COMPLETED | OUTPATIENT
Start: 2018-12-10 | End: 2018-12-10

## 2018-12-10 RX ADMIN — ALBUTEROL SULFATE 2.5 MG: 2.5 SOLUTION RESPIRATORY (INHALATION) at 11:26

## 2019-01-08 ENCOUNTER — OFFICE VISIT (OUTPATIENT)
Dept: PULMONOLOGY | Facility: HOSPITAL | Age: 54
End: 2019-01-08
Payer: COMMERCIAL

## 2019-01-08 VITALS
DIASTOLIC BLOOD PRESSURE: 70 MMHG | HEART RATE: 86 BPM | BODY MASS INDEX: 31.21 KG/M2 | HEIGHT: 70 IN | SYSTOLIC BLOOD PRESSURE: 130 MMHG | OXYGEN SATURATION: 95 % | WEIGHT: 218 LBS

## 2019-01-08 DIAGNOSIS — R06.00 DYSPNEA ON EXERTION: Primary | ICD-10-CM

## 2019-01-08 DIAGNOSIS — R06.00 DYSPNEA, UNSPECIFIED TYPE: Primary | ICD-10-CM

## 2019-01-08 PROCEDURE — 99244 OFF/OP CNSLTJ NEW/EST MOD 40: CPT | Performed by: INTERNAL MEDICINE

## 2019-01-08 RX ORDER — AMLODIPINE BESYLATE 2.5 MG/1
5 TABLET ORAL DAILY
COMMUNITY

## 2019-01-08 RX ORDER — SULFASALAZINE 500 MG/1
TABLET ORAL 2 TIMES DAILY
COMMUNITY
End: 2021-10-04

## 2019-01-08 NOTE — LETTER
January 8, 2019     Emre Stark, [address] [place] [address] Daniel James    Patient: Norman Espinoza   YOB: 1965   Date of Visit: 1/8/2019       Dear Dr Cristina Mak:    Thank you for referring Ethel Chen to me for evaluation  Below are my notes for this consultation  If you have questions, please do not hesitate to call me  I look forward to following your patient along with you           Sincerely,        Micha Mckeon MD        CC: Nando Hernandez PA-C

## 2019-01-08 NOTE — LETTER
January 8, 2019     Bel Magallon, [address]  [address]  [address]    Patient: Deshaun Helm   YOB: 1965   Date of Visit: 1/8/2019       Dear Dr Alessandra Thomas:    Thank you for referring Mary Prado to me for evaluation  Below are my notes for this consultation  If you have questions, please do not hesitate to call me  I look forward to following your patient along with you           Sincerely,        Carmenza Melendrez MD        CC: Rodrigo Peterson MD

## 2019-01-08 NOTE — PROGRESS NOTES
The patient is a 29-year-old lady who has never seen a pulmonologist before  For about 2 years now she has had a great deal of dyspnea on exertion to the point where she feels like she can't work any longer  She had pulmonary function studies done in December of 2018 which were normal although she did have significant bronchodilator response making them supra normal  She feels comfortable at rest   She has minimal if any cough  She has tried an albuterol inhaler which did not really help any  She does walk her dogs about 2 miles a day  She has no chest pains with the above  Past medical history:  She has had an ablation procedure on her heart due to atrial fibrillation, cervical disc surgery, stents placed in her legs for peripheral vascular disease, knee surgery, and hysterectomy  She does have a history of rheumatoid arthritis/Raynaud's disease  She also has hypertension and hyperlipidemia  Medicines and allergies were reviewed    Socially:  She has about 30 pack years of smoking stopping in 2016  Family history:  She has 1 child who is alive suffers from attention deficit disorder    Living situation:  She is live in [place] and [place] throughout her life    Employment history:  She works in a warehouse for several years    Review of systems:  She has gained 60 lb in the last 2 years since she stop smoking  She is up-to-date on her flu shots    Eleven other system reviews are normal/negative    On physical exam    Her pulse ox is 95% on room air blood pressure 130/70 her weight was 218 lb body mass index 31    In general she was mildly obese lady in no respiratory distress    Oropharynx with moist there were no lesions    Neck is supple no lymph nodes in the supraclavicular or cervical chain or palpable    Lung fields are clear and equal bilaterally there are no wheezes rales or rhonchi and she had good air exchange    Heart tones were regular there is no murmur gallop lift rub or christiano    Abdomen is soft no masses nor organomegaly no rebound or guarding    She had no ankle edema  She had no clubbing of her digits  Neurologic is she was alert and oriented there are no focal deficits    Psych a lot likely:  She was quite anxious  Labs:  Pulmonary functions were stated above I had no other labs to review at this time  Assessment:    1  Dyspnea:  I suspect that most of this is related to deconditioning as she has gained 60 lb in the last 2 years  She tells me that her heart is completely normal now and she has been dismissed by her cardiologist   Pulmonary functions on 12/10/2018 showed normal spirometry, lung volumes, and diffusion capacities  She does not believe she has helped any by her bronchodilator inhaler  I am going to do a methacholine challenge test and see her back in the near future to review that and make further recommendations  2   Obesity:  Her body mass index is 31    3  Hyperlipidemia    4  Hypertension    5  Connective tissue disease    6  Patient is an ex-smoker stopping in 2016 after about 30 pack years      Thank you for sending this lady to us

## 2019-01-16 ENCOUNTER — HOSPITAL ENCOUNTER (OUTPATIENT)
Dept: PULMONOLOGY | Facility: HOSPITAL | Age: 54
Discharge: HOME | End: 2019-01-16
Attending: INTERNAL MEDICINE
Payer: COMMERCIAL

## 2019-01-16 DIAGNOSIS — R06.00 DYSPNEA ON EXERTION: ICD-10-CM

## 2019-01-16 PROCEDURE — 94760 N-INVAS EAR/PLS OXIMETRY 1: CPT

## 2019-01-16 PROCEDURE — 94070 EVALUATION OF WHEEZING: CPT

## 2019-01-16 PROCEDURE — 94070 EVALUATION OF WHEEZING: CPT | Performed by: INTERNAL MEDICINE

## 2019-01-16 RX ORDER — SODIUM CHLORIDE FOR INHALATION 0.9 %
3 VIAL, NEBULIZER (ML) INHALATION ONCE
Status: DISCONTINUED | OUTPATIENT
Start: 2019-01-16 | End: 2019-01-16

## 2019-01-16 RX ORDER — LEVALBUTEROL 1.25 MG/.5ML
1.25 SOLUTION, CONCENTRATE RESPIRATORY (INHALATION) ONCE
Status: DISCONTINUED | OUTPATIENT
Start: 2019-01-16 | End: 2019-01-16

## 2019-01-16 RX ORDER — ALBUTEROL SULFATE 2.5 MG/3ML
2.5 SOLUTION RESPIRATORY (INHALATION) ONCE AS NEEDED
Status: COMPLETED | OUTPATIENT
Start: 2019-01-16 | End: 2019-01-16

## 2019-01-16 RX ADMIN — ALBUTEROL SULFATE 2.5 MG: 2.5 SOLUTION RESPIRATORY (INHALATION) at 13:32

## 2019-01-16 RX ADMIN — METHACHOLINE CHLORIDE 5 BREATH: 100 POWDER, FOR SOLUTION RESPIRATORY (INHALATION) at 13:18

## 2019-02-13 ENCOUNTER — OFFICE VISIT (OUTPATIENT)
Dept: PULMONOLOGY | Facility: HOSPITAL | Age: 54
End: 2019-02-13
Payer: COMMERCIAL

## 2019-02-13 VITALS
HEART RATE: 79 BPM | OXYGEN SATURATION: 97 % | DIASTOLIC BLOOD PRESSURE: 78 MMHG | SYSTOLIC BLOOD PRESSURE: 110 MMHG | BODY MASS INDEX: 31.07 KG/M2 | WEIGHT: 217 LBS | HEIGHT: 70 IN

## 2019-02-13 DIAGNOSIS — J45.31 MILD PERSISTENT ASTHMA WITH ACUTE EXACERBATION: Primary | ICD-10-CM

## 2019-02-13 PROCEDURE — 99212 OFFICE O/P EST SF 10 MIN: CPT | Performed by: INTERNAL MEDICINE

## 2019-02-13 NOTE — PROGRESS NOTES
Patient:  Norman Espinoza   :  1965    MRN:  [id number]    SSN:  [id number]    Date of Service:  2019    Primary Care Provider:  No primary care provider on file  HPI:      oNrman Espinoza is a 62 y o  female who presents as a follow-up visit from her original consultation on 2019  When I 1st met her she has shortness of breath  She had gained about 60 lb and had had no improvement with inhalers  I felt that most likely her dyspnea was due to deconditioning  Since seeing me she had a methacholine challenge test which is grossly abnormal compatible with asthma  Physical Exam:  /78 (BP Location: Left arm, Patient Position: Sitting)   Pulse 79   Ht 5' 10" (1 778 m)   Wt 98 4 kg (217 lb)   SpO2 97%   BMI 31 14 kg/m²     Physical Exam  In general:  Mildly overweight lady in no respiratory distress converses in complete sentences not using accessory muscles to breathe  Neck without lymphadenopathy supple    Lung fields:  Clear and equal bilaterally with good air exchange    Heart tones:  Regular no gallop  She is alert and oriented no focal neurologic deficits    Assessment/Plan:  1  Dyspnea: With a grossly abnormal methacholine challenge test I strongly feel that asthma certainly is a major component to her shortness of breath  I have switched her medications to Arnuity 200 mcg inhale 1 puff daily  I gave her samples of this and demonstrated how to use it  For rescue inhaler I suggested Atrovent 2 puffs every 4 hours as needed  I will plan to see her back again in about 2 months to see how she has responded to this combination  I once again strongly advised her to lose weight  Without weight loss I doubt that we are going to make in roads and her dyspnea        2   Obesity:  Her body mass index is 31     3  Hyperlipidemia     4  Hypertension     5  Connective tissue disease     6  Patient is an ex-smoker stopping in 2016 after about 30 pack years          The plan was discussed with the patient and/or family      Electronically signed by: Lucina Oneal MD, 2/13/2019 10:07 AM

## 2019-02-13 NOTE — LETTER
February 13, 2019     Lino Peguero, [address]  [address]  [address]  [facility]    Patient: Norman Espinoza   YOB: 1965   Date of Visit: 2/13/2019       Dear Dr Christina Stahl:    Thank you for referring Ethel Chen to me for evaluation  Below are my notes for this consultation  If you have questions, please do not hesitate to call me  I look forward to following your patient along with you           Sincerely,        Micha Mckeon MD        CC: No Recipients

## 2019-02-15 ENCOUNTER — TELEPHONE (OUTPATIENT)
Dept: PULMONOLOGY | Facility: HOSPITAL | Age: 54
End: 2019-02-15

## 2019-02-15 DIAGNOSIS — J45.909 ASTHMA, UNSPECIFIED ASTHMA SEVERITY, UNSPECIFIED WHETHER COMPLICATED, UNSPECIFIED WHETHER PERSISTENT: Primary | ICD-10-CM

## 2019-02-15 NOTE — TELEPHONE ENCOUNTER
Pt called stating Atrovent is not covered on her formulary  Incruse is the alternative  Will forward to Dr Devang Peterson for ok to dispense samples to try or other med

## 2019-03-05 RX ORDER — ALBUTEROL SULFATE 90 UG/1
2 AEROSOL, METERED RESPIRATORY (INHALATION) EVERY 4 HOURS PRN
Qty: 18 G | Refills: 3 | Status: SHIPPED | OUTPATIENT
Start: 2019-03-05 | End: 2019-04-23

## 2019-04-23 ENCOUNTER — OFFICE VISIT (OUTPATIENT)
Dept: PULMONOLOGY | Facility: HOSPITAL | Age: 54
End: 2019-04-23
Payer: COMMERCIAL

## 2019-04-23 VITALS
WEIGHT: 204 LBS | DIASTOLIC BLOOD PRESSURE: 78 MMHG | HEIGHT: 70 IN | OXYGEN SATURATION: 100 % | HEART RATE: 74 BPM | BODY MASS INDEX: 29.2 KG/M2 | SYSTOLIC BLOOD PRESSURE: 110 MMHG

## 2019-04-23 DIAGNOSIS — J45.20 INTERMITTENT ASTHMA, UNSPECIFIED ASTHMA SEVERITY, UNSPECIFIED WHETHER COMPLICATED: Primary | ICD-10-CM

## 2019-04-23 PROCEDURE — 99212 OFFICE O/P EST SF 10 MIN: CPT | Performed by: INTERNAL MEDICINE

## 2019-04-23 RX ORDER — LEVALBUTEROL TARTRATE 45 UG/1
1 AEROSOL, METERED ORAL EVERY 6 HOURS PRN
Status: SHIPPED | OUTPATIENT
Start: 2019-04-23

## 2019-04-23 RX ORDER — MONTELUKAST SODIUM 10 MG/1
10 TABLET ORAL
Qty: 30 TABLET | Refills: 11 | Status: SHIPPED | OUTPATIENT
Start: 2019-04-23 | End: 2020-04-10 | Stop reason: SDUPTHER

## 2019-04-24 DIAGNOSIS — J44.9 CHRONIC OBSTRUCTIVE PULMONARY DISEASE, UNSPECIFIED COPD TYPE (HCC): Primary | ICD-10-CM

## 2019-04-24 RX ORDER — LEVALBUTEROL TARTRATE 45 UG/1
1-2 AEROSOL, METERED ORAL EVERY 4 HOURS PRN
Qty: 1 INHALER | Refills: 5 | Status: SHIPPED | OUTPATIENT
Start: 2019-04-24 | End: 2021-01-15

## 2020-01-24 ENCOUNTER — OFFICE VISIT (OUTPATIENT)
Dept: PULMONOLOGY | Facility: CLINIC | Age: 55
End: 2020-01-24
Payer: COMMERCIAL

## 2020-01-24 VITALS
BODY MASS INDEX: 30.49 KG/M2 | DIASTOLIC BLOOD PRESSURE: 82 MMHG | OXYGEN SATURATION: 97 % | HEART RATE: 79 BPM | WEIGHT: 213 LBS | HEIGHT: 70 IN | SYSTOLIC BLOOD PRESSURE: 120 MMHG

## 2020-01-24 DIAGNOSIS — J45.20 INTERMITTENT ASTHMA WITHOUT COMPLICATION, UNSPECIFIED ASTHMA SEVERITY: Primary | ICD-10-CM

## 2020-01-24 PROCEDURE — 99212 OFFICE O/P EST SF 10 MIN: CPT | Performed by: INTERNAL MEDICINE

## 2020-01-24 NOTE — PROGRESS NOTES
Patient:  Gisele Cancino   :  1965    MRN:  [id number]    SSN:  [id number]    Date of Service:  2020    Primary Care Provider:  No primary care provider on file  Gisele Cancino is a 60 y o  female who presents for routine follow-up visit  She was last in our office on 2019  Fortunately she has not been hospitalized since then  She had no specific complaints today  Physical Exam:  /82 (BP Location: Left arm, Patient Position: Sitting)   Pulse 79   Ht 5' 10" (1 778 m)   Wt 96 6 kg (213 lb)   SpO2 97%   BMI 30 56 kg/m²   In general:  Somewhat overweight lady in no respiratory distress  Neck:  Supple no lymphadenopathy  Lung fields:  Clear and equal bilaterally no wheezes rales or rhonchi  Heart tones:  Regular no murmur gallop  Neurological:  Alert and oriented no focal deficits  Psychological:  Anxious        Assessment/Plan:    1  asthma:   I initially tried Atrovent and Anoro with her but she felt that she had side effects to both  In 2019 I switched her to Singulair routinely and use Xopenex on an as-needed basis  She did not like Xopenex but believes that the Singulair has been beneficial and has been using it routinely  She is going to continue that indefinitely for now  I will plan to follow her on a yearly basis at this time  She has no primary care provider  I strongly advised her to obtain 1                                 2   Obesity:    She was 213 lb today  She had initially been compliant with a diet and lost about 10 lb but unfortunate last 8 months she has gained all this back again      3   Hyperlipidemia     4   Hypertension     5   Connective tissue disease     6   Patient is an ex-smoker stopping in 2016 after about 30 pack years           The plan was discussed with the patient and/or family      Electronically signed by: Ismael Delgado MD, 2020 1:18 PM

## 2020-04-10 DIAGNOSIS — J45.20 INTERMITTENT ASTHMA, UNSPECIFIED ASTHMA SEVERITY, UNSPECIFIED WHETHER COMPLICATED: ICD-10-CM

## 2020-04-10 RX ORDER — MONTELUKAST SODIUM 10 MG/1
10 TABLET ORAL
Qty: 30 TABLET | Refills: 5 | Status: SHIPPED | OUTPATIENT
Start: 2020-04-10 | End: 2020-09-28

## 2020-07-12 ENCOUNTER — APPOINTMENT (OUTPATIENT)
Dept: RADIOLOGY | Facility: CLINIC | Age: 55
End: 2020-07-12
Payer: COMMERCIAL

## 2020-07-12 ENCOUNTER — OFFICE VISIT (OUTPATIENT)
Dept: URGENT CARE | Facility: CLINIC | Age: 55
End: 2020-07-12
Payer: COMMERCIAL

## 2020-07-12 VITALS
OXYGEN SATURATION: 96 % | DIASTOLIC BLOOD PRESSURE: 65 MMHG | RESPIRATION RATE: 18 BRPM | SYSTOLIC BLOOD PRESSURE: 132 MMHG | TEMPERATURE: 98.4 F | HEART RATE: 82 BPM

## 2020-07-12 DIAGNOSIS — S89.92XA LEFT KNEE INJURY, INITIAL ENCOUNTER: ICD-10-CM

## 2020-07-12 DIAGNOSIS — S86.912A KNEE STRAIN, LEFT, INITIAL ENCOUNTER: Primary | ICD-10-CM

## 2020-07-12 PROCEDURE — 73564 X-RAY EXAM KNEE 4 OR MORE: CPT

## 2020-07-12 PROCEDURE — 99213 OFFICE O/P EST LOW 20 MIN: CPT | Performed by: PHYSICIAN ASSISTANT

## 2020-07-12 RX ORDER — ADALIMUMAB 40MG/0.4ML
KIT SUBCUTANEOUS
COMMUNITY
Start: 2020-07-07 | End: 2021-10-04

## 2020-07-12 NOTE — PROGRESS NOTES
[facility]    NAME: Raudel Kamara is a 60 y o  female  : 1965    MRN: [id number]  DATE: 2020  TIME: 3:20 PM    Assessment and Plan   Knee strain, left, initial encounter [N10 [id number]]  1  Knee strain, left, initial encounter  Ambulatory referral to Orthopedic Surgery   2  Left knee injury, initial encounter  XR knee 4+ vw left injury       Patient Instructions   Patient Instructions   Crutches and knee brace for support  Ice, elevate, tylenol  Follow up with ortho in the next week  Chief Complaint     Chief Complaint   Patient presents with    Knee Pain     Pt c/ left knee pain after falling yesterday  History of Present Illness   10-year-old female here with complaint of left knee injury  Patient states she was walking her dog yesterday and it started raining and down pouring  She slipped on the month  [place] onto the left knee and left lower leg  Has pain and swelling and bruising of the left suprapatellar area and has difficulty walking  Cannot completely straighten her leg  Small abrasions on her left lower leg  Review of Systems   Review of Systems   Constitutional: Negative for chills and fever  Respiratory: Negative for cough and shortness of breath  Cardiovascular: Negative for chest pain     Musculoskeletal:        Left knee injury, see HPI       Current Medications     Current Outpatient Medications:     acetaminophen (TYLENOL) 500 mg tablet, Take 500 mg by mouth every 6 (six) hours as needed, Disp: , Rfl:     amLODIPine (NORVASC) 2 5 mg tablet, Take 5 mg by mouth daily, Disp: , Rfl:     Aspirin (ASPIR-81 PO), every 24 hours, Disp: , Rfl:     diclofenac sodium (VOLTAREN) 1 %, Apply 2 g topically 4 (four) times a day, Disp: , Rfl:     dofetilide (TIKOSYN) 250 mcg capsule, TAKE 1 CAPSULE BY MOUTH TWICE DAILY, Disp: , Rfl:     furosemide (LASIX) 40 mg tablet, Take 1 tablet by mouth see administration instructions 1 tablet on Mon, Wed, Fri 1/2 tablet on Roland Lindsay, Sat, Sun , Disp: , Rfl:     HUMIRA PEN 40 MG/0 4ML PNKT, , Disp: , Rfl:     levalbuterol (XOPENEX HFA) 45 mcg/act inhaler, Inhale 1-2 puffs every 4 (four) hours as needed for wheezing, Disp: 1 Inhaler, Rfl: 5    lisinopril (ZESTRIL) 10 mg tablet, Take 5 mg by mouth daily , Disp: , Rfl:     metoprolol succinate (TOPROL-XL) 25 mg 24 hr tablet, Take 25 mg by mouth 2 (two) times a day , Disp: , Rfl:     montelukast (SINGULAIR) 10 mg tablet, Take 1 tablet (10 mg total) by mouth daily at bedtime, Disp: 30 tablet, Rfl: 5    Multiple Vitamins-Minerals (MULTIVITAMIN ADULT PO), Take 2 tablets by mouth, Disp: , Rfl:     rosuvastatin (CRESTOR) 20 MG tablet, Take 20 mg by mouth, Disp: , Rfl:     sulfaSALAzine (AZULFIDINE) 500 mg tablet, Take by mouth 2 (two) times a day, Disp: , Rfl:     XARELTO 20 MG tablet, , Disp: , Rfl:     Current Facility-Administered Medications:     levalbuterol (XOPENEX HFA) inhaler 1 puff, 1 puff, Inhalation, Q6H PRN, Abel Sanders MD    Current Allergies     Allergies as of 07/12/2020 - Reviewed 07/12/2020   Allergen Reaction Noted    Albuterol  06/06/2018    Aspirin  10/03/2017    Umeclidinium-vilanterol  06/06/2018          The following portions of the patient's history were reviewed and updated as appropriate: allergies, current medications, past family history, past medical history, past social history, past surgical history and problem list    Past Medical History:   Diagnosis Date    Allergic     Anxiety     Atrial fibrillation ([facility] [address] )     Benign hypertensive heart and CKD, stage 3 (GFR 30-59), w CHF ([facility] [address] ) 9/5/2018    Depression     Heart attack ([facility] [address] )     Hypertension     SOB (shortness of breath)     chronic     Past Surgical History:   Procedure Laterality Date    ABLATION COLPOCLESIS      ANGIOPLASTY Bilateral     stent placement behind knee    HYSTERECTOMY      KNEE SURGERY Left 1980    NECK SURGERY  2006    plate (C7)     Family History   Problem Relation Age of Onset    Osteoporosis Mother     Stroke Sister     Atrial fibrillation Sister     Cancer Brother      Social History     Socioeconomic History    Marital status: Single     Spouse name: Not on file    Number of children: Not on file    Years of education: Not on file    Highest education level: Not on file   Occupational History    Occupation: disability   Social Needs    Financial resource strain: Not on file    Food insecurity:     Worry: Not on file     Inability: Not on file    Transportation needs:     Medical: Not on file     Non-medical: Not on file   Tobacco Use    Smoking status: Former Smoker     Packs/day: 0 75     Years: 39 00     Pack years: 29 25     Types: Cigarettes     Start date: 1977     Last attempt to quit: 2016     Years since quittin 5    Smokeless tobacco: Never Used    Tobacco comment: no passive smoke exposure   Substance and Sexual Activity    Alcohol use: No    Drug use: No    Sexual activity: Yes     Partners: Male   Lifestyle    Physical activity:     Days per week: Not on file     Minutes per session: Not on file    Stress: Not on file   Relationships    Social connections:     Talks on phone: Not on file     Gets together: Not on file     Attends Jain service: Not on file     Active member of club or organization: Not on file     Attends meetings of clubs or organizations: Not on file     Relationship status: Not on file    Intimate partner violence:     Fear of current or ex partner: Not on file     Emotionally abused: Not on file     Physically abused: Not on file     Forced sexual activity: Not on file   Other Topics Concern    Not on file   Social History Narrative    Not on file     Medications have been verified  Objective   /65   Pulse 82   Temp 98 4 °F (36 9 °C)   Resp 18   SpO2 96%      Physical Exam   Physical Exam   Constitutional: She appears well-developed and well-nourished  No distress     HENT:   Head: Normocephalic and atraumatic  Cardiovascular: Normal rate, regular rhythm, normal heart sounds and intact distal pulses  Pulmonary/Chest: Effort normal and breath sounds normal  No respiratory distress  Musculoskeletal:        Left knee: She exhibits decreased range of motion, swelling and effusion  Tenderness (Tenderness over quadriceps tendon and superior aspect of knee) found  Lateral joint line and LCL tenderness noted  Legs:  Nursing note and vitals reviewed

## 2020-07-14 ENCOUNTER — OFFICE VISIT (OUTPATIENT)
Dept: OBGYN CLINIC | Facility: CLINIC | Age: 55
End: 2020-07-14
Payer: COMMERCIAL

## 2020-07-14 VITALS
SYSTOLIC BLOOD PRESSURE: 130 MMHG | DIASTOLIC BLOOD PRESSURE: 66 MMHG | WEIGHT: 213 LBS | HEART RATE: 80 BPM | BODY MASS INDEX: 30.49 KG/M2 | TEMPERATURE: 98.9 F | HEIGHT: 70 IN

## 2020-07-14 DIAGNOSIS — S89.92XA INJURY OF LEFT KNEE, INITIAL ENCOUNTER: Primary | ICD-10-CM

## 2020-07-14 PROCEDURE — 1036F TOBACCO NON-USER: CPT | Performed by: FAMILY MEDICINE

## 2020-07-14 PROCEDURE — 3008F BODY MASS INDEX DOCD: CPT | Performed by: FAMILY MEDICINE

## 2020-07-14 PROCEDURE — 3075F SYST BP GE 130 - 139MM HG: CPT | Performed by: FAMILY MEDICINE

## 2020-07-14 PROCEDURE — 99203 OFFICE O/P NEW LOW 30 MIN: CPT | Performed by: FAMILY MEDICINE

## 2020-07-14 PROCEDURE — 3078F DIAST BP <80 MM HG: CPT | Performed by: FAMILY MEDICINE

## 2020-07-14 NOTE — LETTER
July 14, 2020     Patient: Claudia Barajas   YOB: 1965   Date of Visit: 7/14/2020       To Whom it May Concern:    John Wright is under my professional care  She was seen in my office on 7/14/2020  She is unable to return to work at this time  She will be re-evaluated in 4 weeks  If you have any questions or concerns, please don't hesitate to call           Sincerely,          [employer], DO        CC: No Recipients

## 2020-07-14 NOTE — PROGRESS NOTES
Assessment/Plan:  Assessment/Plan   Diagnoses and all orders for this visit:    Injury of left knee, initial encounter  -     Brace        12-year-old active female with left knee pain and swelling from fall injury 07/11/2020  Discussed with patient physical exam, radiographs, impression and plan  X-rays of left knee are unremarkable for acute osseous abnormality  Physical exam noted for effusion knee with ecchymosis at the medial and lateral aspects  She has point tenderness at the lateral femoral condyle, medial tibial plateau  Range of motion limited extension of -5° and flexion to 90°  There is no appreciable collateral ligament laxity  There is no groin pain with TOMAS and FADDIR maneuvers of the hips  Clinical impression that she may have occult fracture to the femoral condyle and/or tibial plateau  I discussed treatment in the form of rest   She is to continue with crutches and I provided with hinged knee brace  She may toe-touch weightbear  She will follow up in 4 weeks at which point she will be re-evaluated  Subjective:   Patient ID: Alycia Alberto is a 60 y o  female  Chief Complaint   Patient presents with    Left Knee - Pain       12-year-old active female with left knee pain and swelling from fall injury on 07/11/2020  She slipped on mud fell forward  She had pain of the left knee described as sudden onset, generalized to the knee but worse at the medial and lateral aspects, throbbing and sharp, constant, severe intensity, radiating distally along the posterior aspect of lower leg, worse with bending and bearing weight, associated swelling, and improved with rest   She presented to Urgent Care where x-ray evaluation unremarkable  She was placed in knee immobilizer, given crutches, and referred to orthopedic care  She still having difficulty with bearing weight and bending the knee  She is currently on Xarelto so she has been taking Tylenol and icing to help with pain      Knee Pain This is a new problem  The current episode started in the past 7 days  The problem occurs constantly  The problem has been unchanged  Associated symptoms include arthralgias and joint swelling  Pertinent negatives include no abdominal pain, chest pain, chills, fever, numbness, rash, sore throat or weakness  The symptoms are aggravated by standing, twisting, walking and bending  She has tried rest, acetaminophen, position changes and ice for the symptoms  The treatment provided mild relief  The following portions of the patient's history were reviewed and updated as appropriate: She  has a past medical history of Allergic, Anxiety, Atrial fibrillation ([facility] [address] Benign hypertensive heart and CKD, stage 3 (GFR 30-59), w CHF ([facility] [address] ) (9/5/2018), Depression, Heart attack ([facility] [address] ), Hypertension, and SOB (shortness of breath)  She  has a past surgical history that includes Angioplasty (Bilateral); Neck surgery (2006); Knee surgery (Left, 1980); Ablation colpoclesis; and Hysterectomy  Her family history includes Atrial fibrillation in her sister; Cancer in her brother; Osteoporosis in her mother; Stroke in her sister  She  reports that she quit smoking about 4 years ago  Her smoking use included cigarettes  She started smoking about 43 years ago  She has a 29 25 pack-year smoking history  She has never used smokeless tobacco  She reports that she does not drink alcohol or use drugs  She is allergic to albuterol; aspirin; and umeclidinium-vilanterol       Review of Systems   Constitutional: Negative for chills and fever  HENT: Negative for sore throat  Eyes: Negative for visual disturbance  Respiratory: Negative for shortness of breath  Cardiovascular: Negative for chest pain  Gastrointestinal: Negative for abdominal pain  Genitourinary: Negative for flank pain  Musculoskeletal: Positive for arthralgias and joint swelling  Skin: Negative for rash and wound     Neurological: Negative for weakness and numbness  Hematological: Does not bruise/bleed easily  Psychiatric/Behavioral: Negative for self-injury  Objective:  Vitals:    07/14/20 1440   BP: 130/66   BP Location: Left arm   Patient Position: Sitting   Cuff Size: Standard   Pulse: 80   Temp: 98 9 °F (37 2 °C)   Weight: 96 6 kg (213 lb)   Height: 5' 10" (1 778 m)     Left Knee Exam     Muscle Strength   The patient has normal left knee strength  Tenderness   The patient is experiencing tenderness in the lateral joint line and medial joint line (Lateral femoral condyle, medial tibial plateau, medial femoral condyle)  Range of Motion   Extension: -5   Flexion: 90     Tests   Varus: negative Valgus: negative    Other   Effusion: effusion present      Right Hip Exam     Muscle Strength   Flexion: 5/5     Tests   TOMAS: negative    Comments:  Negative FADDIR      Left Hip Exam     Muscle Strength   Flexion: 5/5     Tests   TOMAS: negative    Comments:  Negative FADDIR            Observations   Left Knee   Positive for effusion  Physical Exam   Constitutional: She is oriented to person, place, and time  She appears well-developed  No distress  HENT:   Head: Normocephalic  Eyes: Conjunctivae are normal    Neck: No tracheal deviation present  Cardiovascular: Normal rate  Pulmonary/Chest: Effort normal  No respiratory distress  Abdominal: She exhibits no distension  Musculoskeletal:        Left knee: She exhibits effusion  Neurological: She is alert and oriented to person, place, and time  Skin: Skin is warm and dry  Psychiatric: She has a normal mood and affect  Her behavior is normal    Nursing note and vitals reviewed  I have personally reviewed pertinent films in PACS and my interpretation is No acute osseous abnormality of left knee

## 2020-07-15 ENCOUNTER — TELEPHONE (OUTPATIENT)
Dept: OBGYN CLINIC | Facility: HOSPITAL | Age: 55
End: 2020-07-15

## 2020-07-15 NOTE — TELEPHONE ENCOUNTER
Patient sees Dr Jennifer Benitez  Patient is calling in stating that she had dropped off FMLA paperwork and it seemed to be that we were away at lunch so she left it at the  on the keyboard and is asking if this has been received or not? She is asking for a call back relating this          Call back# [phone]

## 2020-07-16 NOTE — TELEPHONE ENCOUNTER
Patient is calling back in wanting to know if this paperwork has been received, I contacted the office in which was advised it was received and can take up to 14 days  She is very upset and is now requesting a note from the Dr that she is able to return back to work  Patient is asking if the Dr can contact her directly relating this        Call back # [phone]

## 2020-07-17 NOTE — TELEPHONE ENCOUNTER
Has anyone in the [facility] [employer] [facility] office received this paperwork

## 2020-07-17 NOTE — TELEPHONE ENCOUNTER
Patient was given a note at the visit saying she is unable to return to work  Please clarify with patient if she is requesting a note stating that she may return to work, and if so she will need to let us know which say she would like to return      Thanks

## 2020-07-17 NOTE — TELEPHONE ENCOUNTER
Patient states the return to work note is solved  She wanted to let Dr Viktoriya Shell know that she developed a blister that she popped under soft brace  I advised she should not pop any blister  She should keep the area clean dry and intact and covered with a dry sterile gauze to avoid friction of brace  Call Monday if she is not seeing improvement to area  Patient verbalized understanding

## 2020-07-21 NOTE — TELEPHONE ENCOUNTER
Called patient back in regards to her disability paperwork  I only received the first page of this paperwork  I left a message stating this information and I would like to see if she could fax or email me the rest of this paperwork to completed  I gave her my direct number to call me back

## 2020-07-21 NOTE — TELEPHONE ENCOUNTER
Spoke to pt  Who stated she is having an increase in swelling and the black and blue is getting worse, pt  Report black and blue down to her toes and stated she is now getting a blister at the "outside area to knee " pt  Would like to know if she can get a video visit set up   Thanks

## 2020-07-23 NOTE — TELEPHONE ENCOUNTER
Patient called to confirm I received all her paperwork  I confirmed I received all her paperwork as well as completed all her paperwork and faxed it to [facility] as well at [facility]

## 2020-07-24 NOTE — TELEPHONE ENCOUNTER
Please reach out to patient to offer appointment to come into the office as soon as possible      Thanks

## 2020-08-11 ENCOUNTER — OFFICE VISIT (OUTPATIENT)
Dept: OBGYN CLINIC | Facility: CLINIC | Age: 55
End: 2020-08-11
Payer: COMMERCIAL

## 2020-08-11 VITALS
WEIGHT: 213 LBS | BODY MASS INDEX: 30.56 KG/M2 | HEART RATE: 66 BPM | SYSTOLIC BLOOD PRESSURE: 123 MMHG | DIASTOLIC BLOOD PRESSURE: 83 MMHG

## 2020-08-11 DIAGNOSIS — S89.92XD ACUTE CARTILAGE INJURY OF KNEE, LEFT, SUBSEQUENT ENCOUNTER: Primary | ICD-10-CM

## 2020-08-11 PROCEDURE — 3079F DIAST BP 80-89 MM HG: CPT | Performed by: FAMILY MEDICINE

## 2020-08-11 PROCEDURE — 3074F SYST BP LT 130 MM HG: CPT | Performed by: FAMILY MEDICINE

## 2020-08-11 PROCEDURE — 1036F TOBACCO NON-USER: CPT | Performed by: FAMILY MEDICINE

## 2020-08-11 PROCEDURE — 99213 OFFICE O/P EST LOW 20 MIN: CPT | Performed by: FAMILY MEDICINE

## 2020-08-11 NOTE — PROGRESS NOTES
Assessment/Plan:  Assessment/Plan   Diagnoses and all orders for this visit:    Acute cartilage injury of knee, left, subsequent encounter  -     MRI knee left  wo contrast; Future        64-year-old active female with left knee pain and swelling from fall injury on 07/11/2020  Discussed with patient physical exam, impression and plan  Physical exam noted for effusion of the knee  She has tenderness at the medial and lateral tibial plateaus, as well as medial joint line  She has extension limited to -5° and flexion to 100°  There is positive Monica's at the lateral aspect  She has 1 months since injury and has not improved despite conservative management in the form of resting with use of crutches and knee brace, icing, elevating, and home stretching  There is suspicion for internal derangement particularly meniscus tear and occult tibial plateau fracture  At this time I will refer for MRI of knee to evaluate for internal derangement as surgical intervention may be warranted  She will follow up after getting MRI done  Subjective:   Patient ID: Maren Lara is a 60 y o  female  Chief Complaint   Patient presents with    Left Knee - Follow-up       64-year-old active female following up for left knee pain and swelling of onset from fall injury on 07/11/2020  She was last seen by me 4 weeks ago which point there was suspicion for occult tibial plateau versus femoral condyle occult fracture  She was provided with hinged knee brace and was advised to use crutches to limit weight-bearing  She denies any improvement since her last visit  She has pain described as generalized to the knee but worse at the medial and lateral aspects, constant, aching and throbbing and sometimes sharp, nonradiating, worse with bearing weight and bending the knee, associated with swelling, and improved resting    She has been using crutches and hinged knee brace and limiting bearing weight, elevating, icing, taking Tylenol, and doing home stretching  Knee Pain   This is a new problem  The current episode started more than 1 month ago  The problem occurs constantly  The problem has been unchanged  Associated symptoms include arthralgias and joint swelling  Pertinent negatives include no numbness or weakness  The symptoms are aggravated by twisting, standing, walking and bending  She has tried rest, acetaminophen, ice and position changes (Home stretching) for the symptoms  The treatment provided no relief  Review of Systems   Musculoskeletal: Positive for arthralgias and joint swelling  Neurological: Negative for weakness and numbness  Objective:  Vitals:    08/11/20 0924   BP: 123/83   BP Location: Left arm   Patient Position: Sitting   Cuff Size: Large   Pulse: 66   Weight: 96 6 kg (213 lb)     Left Knee Exam     Muscle Strength   The patient has normal left knee strength  Tenderness   The patient is experiencing tenderness in the medial joint line (Medial and lateral tibial plateau)  Range of Motion   Extension: -5   Flexion: 100     Tests   Monica:  Lateral - positive  Varus: negative Valgus: negative    Other   Effusion: effusion present          Observations   Left Knee   Positive for effusion  Physical Exam  Vitals signs and nursing note reviewed  Constitutional:       General: She is not in acute distress  Appearance: She is well-developed  HENT:      Head: Normocephalic  Eyes:      Conjunctiva/sclera: Conjunctivae normal    Neck:      Trachea: No tracheal deviation  Cardiovascular:      Rate and Rhythm: Normal rate  Pulmonary:      Effort: Pulmonary effort is normal  No respiratory distress  Abdominal:      General: There is no distension  Musculoskeletal:         General: Tenderness present  Left knee: She exhibits effusion  Skin:     General: Skin is warm and dry  Neurological:      Mental Status: She is alert and oriented to person, place, and time     Psychiatric: Behavior: Behavior normal

## 2020-08-11 NOTE — LETTER
August 11, 2020     Patient: Norman Espinoza   YOB: 1965   Date of Visit: 8/11/2020       To Whom it May Concern:    Ethel April is under my professional care  She was seen in my office on 8/11/2020  She is unable to return to work at this time  She will be re-evaluated after getting MRI done  If you have any questions or concerns, please don't hesitate to call           Sincerely,          [employer], DO        CC: No Recipients

## 2020-08-12 ENCOUNTER — TELEPHONE (OUTPATIENT)
Dept: OBGYN CLINIC | Facility: CLINIC | Age: 55
End: 2020-08-12

## 2020-08-12 ENCOUNTER — HOSPITAL ENCOUNTER (OUTPATIENT)
Dept: MRI IMAGING | Facility: HOSPITAL | Age: 55
Discharge: HOME | End: 2020-08-12
Attending: FAMILY MEDICINE

## 2020-08-12 DIAGNOSIS — F40.240 CLAUSTROPHOBIA: Primary | ICD-10-CM

## 2020-08-12 DIAGNOSIS — S89.92XD ACUTE CARTILAGE INJURY OF KNEE, LEFT, SUBSEQUENT ENCOUNTER: ICD-10-CM

## 2020-08-12 RX ORDER — ALPRAZOLAM 0.5 MG/1
0.5 TABLET ORAL
Qty: 2 TABLET | Refills: 0 | Status: SHIPPED | OUTPATIENT
Start: 2020-08-12 | End: 2020-08-13

## 2020-08-12 NOTE — TELEPHONE ENCOUNTER
Dr Lawson Shown   #: [phone]           Patient is calling in requesting medication  Patient is having her mri done tomorrow and she is very nervous and scared   Please send to the pharmacy on file, thank you

## 2020-08-13 ENCOUNTER — HOSPITAL ENCOUNTER (EMERGENCY)
Facility: HOSPITAL | Age: 55
Discharge: HOME | End: 2020-08-13
Attending: EMERGENCY MEDICINE
Payer: COMMERCIAL

## 2020-08-13 VITALS
WEIGHT: 225 LBS | DIASTOLIC BLOOD PRESSURE: 87 MMHG | BODY MASS INDEX: 32.28 KG/M2 | RESPIRATION RATE: 18 BRPM | SYSTOLIC BLOOD PRESSURE: 131 MMHG | OXYGEN SATURATION: 98 % | TEMPERATURE: 97.8 F | HEART RATE: 84 BPM

## 2020-08-13 DIAGNOSIS — T78.40XA ALLERGIC REACTION: Primary | ICD-10-CM

## 2020-08-13 PROCEDURE — 99284 EMERGENCY DEPT VISIT MOD MDM: CPT | Performed by: EMERGENCY MEDICINE

## 2020-08-13 PROCEDURE — 96375 TX/PRO/DX INJ NEW DRUG ADDON: CPT

## 2020-08-13 PROCEDURE — 96374 THER/PROPH/DIAG INJ IV PUSH: CPT

## 2020-08-13 PROCEDURE — 99283 EMERGENCY DEPT VISIT LOW MDM: CPT

## 2020-08-13 RX ORDER — METHYLPREDNISOLONE SODIUM SUCCINATE 125 MG/2ML
125 INJECTION, POWDER, LYOPHILIZED, FOR SOLUTION INTRAMUSCULAR; INTRAVENOUS ONCE
Status: COMPLETED | OUTPATIENT
Start: 2020-08-13 | End: 2020-08-13

## 2020-08-13 RX ORDER — DIPHENHYDRAMINE HYDROCHLORIDE 50 MG/ML
50 INJECTION INTRAMUSCULAR; INTRAVENOUS ONCE
Status: COMPLETED | OUTPATIENT
Start: 2020-08-13 | End: 2020-08-13

## 2020-08-13 RX ORDER — FAMOTIDINE 40 MG/1
40 TABLET, FILM COATED ORAL DAILY
Qty: 3 TABLET | Refills: 0 | Status: SHIPPED | OUTPATIENT
Start: 2020-08-13 | End: 2021-10-04 | Stop reason: ALTCHOICE

## 2020-08-13 RX ORDER — PREDNISONE 20 MG/1
TABLET ORAL
Qty: 6 TABLET | Refills: 0 | Status: SHIPPED | OUTPATIENT
Start: 2020-08-13 | End: 2021-01-15

## 2020-08-13 RX ADMIN — FAMOTIDINE 40 MG: 10 INJECTION INTRAVENOUS at 18:05

## 2020-08-13 RX ADMIN — METHYLPREDNISOLONE SODIUM SUCCINATE 125 MG: 125 INJECTION, POWDER, FOR SOLUTION INTRAMUSCULAR; INTRAVENOUS at 18:11

## 2020-08-13 RX ADMIN — DIPHENHYDRAMINE HYDROCHLORIDE 50 MG: 50 INJECTION, SOLUTION INTRAMUSCULAR; INTRAVENOUS at 18:05

## 2020-08-13 NOTE — ED NOTES
MRI made awrae of patient returning to have MRI done at this time     Alex Guillaume, [facility]  08/13/20 1925

## 2020-08-13 NOTE — ED PROVIDER NOTES
History  Chief Complaint   Patient presents with    Allergic Reaction     pt took 2 xanax today for outpatient MRI, pt anxious, itchy, red     57-year-old female presented to the MRI scan she as an outpatient for an exam of her left knee  She took alprazolam 0 5 mg that was prescribed to her approximately half an hour prior to the MRI upon arrival there she was extremely anxious itchy she felt like there is bugs crawling on her skin and her skin was red  She denies any shortness of breath she has no voice change no tongue swelling or difficulty swallowing she does not have an allergy to alprazolam;           Prior to Admission Medications   Prescriptions Last Dose Informant Patient Reported? Taking?    Aspirin (ASPIR-81 PO)   Yes No   Sig: every 24 hours   HUMIRA PEN 40 MG/0 4ML PNKT   Yes No   Multiple Vitamins-Minerals (MULTIVITAMIN ADULT PO)   Yes No   Sig: Take 2 tablets by mouth   XARELTO 20 MG tablet   Yes No   acetaminophen (TYLENOL) 500 mg tablet   Yes No   Sig: Take 500 mg by mouth every 6 (six) hours as needed   amLODIPine (NORVASC) 2 5 mg tablet  Self Yes No   Sig: Take 5 mg by mouth daily   diclofenac sodium (VOLTAREN) 1 %  Self Yes No   Sig: Apply 2 g topically 4 (four) times a day   dofetilide (TIKOSYN) 250 mcg capsule   Yes No   Sig: TAKE 1 CAPSULE BY MOUTH TWICE DAILY   furosemide (LASIX) 40 mg tablet   Yes No   Sig: Take 1 tablet by mouth see administration instructions 1 tablet on Mon, Wed, Fri 1/2 tablet on Tues, Thurs, Sat, Sun    levalbuterol [facility]) 45 mcg/act inhaler   No No   Sig: Inhale 1-2 puffs every 4 (four) hours as needed for wheezing   lisinopril (ZESTRIL) 10 mg tablet   Yes No   Sig: Take 5 mg by mouth daily    metoprolol succinate (TOPROL-XL) 25 mg 24 hr tablet   Yes No   Sig: Take 25 mg by mouth 2 (two) times a day    montelukast (SINGULAIR) 10 mg tablet   No No   Sig: Take 1 tablet (10 mg total) by mouth daily at bedtime   rosuvastatin (CRESTOR) 20 MG tablet   Yes No   Sig: Take 20 mg by mouth   sulfaSALAzine (AZULFIDINE) 500 mg tablet  Self Yes No   Sig: Take by mouth 2 (two) times a day      Facility-Administered Medications Last Administration Doses Remaining   levalbuterol (XOPENEX HFA) inhaler 1 puff None recorded           Past Medical History:   Diagnosis Date    Allergic     Anxiety     Atrial fibrillation (HCC)     Benign hypertensive heart and CKD, stage 3 (GFR 30-59), w CHF ([facility] [address] ) 2018    Depression     Heart attack ([facility] )     Hypertension     SOB (shortness of breath)     chronic       Past Surgical History:   Procedure Laterality Date    ABLATION COLPOCLESIS      ANGIOPLASTY Bilateral     stent placement behind knee    HYSTERECTOMY      KNEE SURGERY Left 1980    NECK SURGERY  2006    plate (C7)       Family History   Problem Relation Age of Onset    Osteoporosis Mother     Stroke Sister     Atrial fibrillation Sister     Cancer Brother      I have reviewed and agree with the history as documented  E-Cigarette/Vaping    E-Cigarette Use Never User      E-Cigarette/Vaping Substances     Social History     Tobacco Use    Smoking status: Former Smoker     Packs/day: 0 75     Years: 39 00     Pack years: 29 25     Types: Cigarettes     Start date: 1977     Last attempt to quit: 2016     Years since quittin 6    Smokeless tobacco: Never Used    Tobacco comment: no passive smoke exposure   Substance Use Topics    Alcohol use: No    Drug use: No       Review of Systems   Constitutional: Negative for activity change, appetite change, fatigue and fever  HENT: Negative for congestion, ear pain, facial swelling, rhinorrhea, sinus pressure, sinus pain, sore throat, trouble swallowing and voice change  Eyes: Negative for discharge  Respiratory: Negative for shortness of breath, wheezing and stridor  Cardiovascular: Negative for chest pain  Gastrointestinal: Negative for abdominal pain, nausea and vomiting     Genitourinary: Negative for difficulty urinating  Musculoskeletal: Negative for arthralgias, gait problem and joint swelling  Skin: Positive for rash (generalized redness)  Neurological: Negative for dizziness, weakness, light-headedness and headaches  Psychiatric/Behavioral: Negative for confusion  Physical Exam  Physical Exam  Vitals signs and nursing note reviewed  Constitutional:       General: She is in acute distress (anxoius)  HENT:      Head: Normocephalic  Right Ear: Tympanic membrane normal       Left Ear: Tympanic membrane normal       Nose: Nose normal  No congestion or rhinorrhea  Mouth/Throat:      Mouth: Mucous membranes are moist    Eyes:      General:         Right eye: No discharge  Left eye: No discharge  Extraocular Movements: Extraocular movements intact  Pupils: Pupils are equal, round, and reactive to light  Neck:      Musculoskeletal: Normal range of motion  No neck rigidity or muscular tenderness  Cardiovascular:      Rate and Rhythm: Tachycardia present  Pulses: Normal pulses  Pulmonary:      Effort: Pulmonary effort is normal  No respiratory distress  Breath sounds: No stridor  No wheezing, rhonchi or rales  Abdominal:      General: Abdomen is flat  Bowel sounds are normal       Tenderness: There is no abdominal tenderness  Comments: Back no midline or CVA tendneress   Musculoskeletal: Normal range of motion  Left lower leg: No edema  Comments: Left knee braces   Lymphadenopathy:      Cervical: No cervical adenopathy  Skin:     General: Skin is warm  Capillary Refill: Capillary refill takes less than 2 seconds  Findings: Rash present  Comments: Generalized redness   Neurological:      General: No focal deficit present  Mental Status: She is alert  Cranial Nerves: No cranial nerve deficit     Psychiatric:      Comments: anxious         Vital Signs  ED Triage Vitals   Temperature Pulse Respirations Blood Pressure SpO2   08/13/20 1759 08/13/20 1759 08/13/20 1759 08/13/20 1759 08/13/20 1801   97 8 °F (36 6 °C) (!) 106 (!) 24 (!) 191/101 95 %      Temp Source Heart Rate Source Patient Position - Orthostatic VS BP Location FiO2 (%)   08/13/20 1759 08/13/20 1759 08/13/20 1759 08/13/20 1759 --   Temporal Monitor Sitting Left arm       Pain Score       --                  Vitals:    08/13/20 1759 08/13/20 1813 08/13/20 1910 08/13/20 1925   BP: (!) 191/101 134/87 128/87 131/87   Pulse: (!) 106 92 91 84   Patient Position - Orthostatic VS: Sitting Sitting Sitting Sitting         Visual Acuity      ED Medications  Medications   diphenhydrAMINE (BENADRYL) injection 50 mg (50 mg Intravenous Given 8/13/20 1805)   methylPREDNISolone sodium succinate (Solu-MEDROL) injection 125 mg (125 mg Intravenous Given 8/13/20 1811)   famotidine (PEPCID) injection 40 mg (40 mg Intravenous Given 8/13/20 1805)       Diagnostic Studies  Results Reviewed     None                 No orders to display              Procedures  Procedures         ED Course  ED Course as of Aug 14 0234   Thu Aug 13, 2020   1804 Pill imprint on second tablet was checked confirmed to alprazolam 0 5mg      1909 Patient feel improved after treatment no SOB no wheezing no tongue swelling itchiness resolved  Recheck rash resolved no uvular edema; lungs CTA      1909 Patient asked me to dispose of her alprazolam tablet placed in black box in med room witnessed by [id number] with patient that I would not be able to provide her with any additional medications          US AUDIT      Most Recent Value   Initial Alcohol Screen: US AUDIT-C    1  How often do you have a drink containing alcohol? 6 Filed at: 08/13/2020 1800   2  How many drinks containing alcohol do you have on a typical day you are drinking? 3 Filed at: 08/13/2020 1800   3b  FEMALE Any Age, or MALE 65+: How often do you have 4 or more drinks on one occassion?   0 Filed at: 08/13/2020 1800 Audit-C Score  (!) 9 Filed at: 08/13/2020 1800   Full Alcohol Screen: US AUDIT   4  How often during the last year have you found that you were not able to stop drinking once you had started? 0 Filed at: 08/13/2020 1800   5  How often during past year have you failed to do what was normally expected of you because of drinking? 0 Filed at: 08/13/2020 1800   6  How often in past year have you needed a first drink in the morning to get yourself going after a heavy drinking session? 0 Filed at: 08/13/2020 1800   7  How often in past year have you had feeling of guilt or remorse after drinking? 0 Filed at: 08/13/2020 1800   8  How often in past year have you been unable to remember what happened night before because you had been drinking? 0 Filed at: 08/13/2020 1800   9  Have you or someone else been injured as a result of your drinking? 0 Filed at: 08/13/2020 1800   10  Has a relative, friend, doctor or other health worker been concerned about your drinking and suggested you cut down?   0 Filed at: 08/13/2020 1800   AUDIT Total Score  9 Filed at: 08/13/2020 1800                  EMIGDIO/DAST-10      Most Recent Value   How many times in the past year have you    Used an illegal drug or used a prescription medication for non-medical reasons? Never Filed at: 08/13/2020 1801                                MDM  Number of Diagnoses or Management Options  Allergic reaction:   Diagnosis management comments: Mdm:  Allergic reaction to alprazolam as evidence by skin redness and itchiness  This time there is no evidence of angioedema will initiate Solu-Medrol, Benadryl, Pepcid  Disposition  Final diagnoses:    Allergic reaction - alprazolam     Time reflects when diagnosis was documented in both MDM as applicable and the Disposition within this note     Time User Action Codes Description Comment    8/13/2020  7:12 PM [place] Add [T78 40XA] Allergic reaction     8/13/2020  7:12 PM [place] Modify [T78 40XA] Allergic reaction alprazolam      ED Disposition     ED Disposition Condition Date/Time Comment    Discharge Stable Thu Aug 13, 2020  7:20 PM Amandeep Rodriguez discharge to home/self care              Follow-up Information     Follow up With Specialties Details Why Contact Info Additional Nicole E[address] Family Medicine In 4 days if not improved [address] [facility] [id number] [phone] 24[address] [facility], [address] [place] [address], [address] [facility] Emergency Department Emergency Medicine  return with any new or worsening symptoms [place]i [place] [id number]  [phone] [place] ED, [address], [address]          Discharge Medication List as of 8/13/2020  7:20 PM      START taking these medications    Details   famotidine (PEPCID) 40 MG tablet Take 1 tablet (40 mg total) by mouth daily for 3 days, Starting Thu 8/13/2020, Until Sun 8/16/2020, Normal      predniSONE 20 mg tablet 2 tabs twice daily (40mg) by mouth daily for 3 days, Normal         CONTINUE these medications which have NOT CHANGED    Details   acetaminophen (TYLENOL) 500 mg tablet Take 500 mg by mouth every 6 (six) hours as needed, Historical Med      amLODIPine (NORVASC) 2 5 mg tablet Take 5 mg by mouth daily, Historical Med      Aspirin (ASPIR-81 PO) every 24 hours, Historical Med      diclofenac sodium (VOLTAREN) 1 % Apply 2 g topically 4 (four) times a day, Historical Med      dofetilide (TIKOSYN) 250 mcg capsule TAKE 1 CAPSULE BY MOUTH TWICE DAILY, Historical Med      furosemide (LASIX) 40 mg tablet Take 1 tablet by mouth see administration instructions 1 tablet on Mon, Wed, Fri 1/2 tablet on Tues, Thurs, Sat, Sun , Starting Tue 6/19/2018, Historical Med      HUMIRA PEN 40 MG/0 4ML PNKT Starting Tue 7/7/2020, Historical Med      levalbuterol (XOPENEX HFA) 45 mcg/act inhaler Inhale 1-2 puffs every 4 (four) hours as needed for wheezing, Starting Wed 4/24/2019, Normal      lisinopril (ZESTRIL) 10 mg tablet Take 5 mg by mouth daily , Starting Wed 5/23/2018, Historical Med      metoprolol succinate (TOPROL-XL) 25 mg 24 hr tablet Take 25 mg by mouth 2 (two) times a day , Starting Thu 11/23/2017, Historical Med      montelukast (SINGULAIR) 10 mg tablet Take 1 tablet (10 mg total) by mouth daily at bedtime, Starting Fri 4/10/2020, Normal      Multiple Vitamins-Minerals (MULTIVITAMIN ADULT PO) Take 2 tablets by mouth, Historical Med      rosuvastatin (CRESTOR) 20 MG tablet Take 20 mg by mouth, Starting Mon 3/26/2018, Historical Med      sulfaSALAzine (AZULFIDINE) 500 mg tablet Take by mouth 2 (two) times a day, Historical Med      XARELTO 20 MG tablet Starting Thu 7/5/2018, Historical Med           No discharge procedures on file      PDMP Review       Value Time User    PDMP Reviewed  Yes 8/12/2020  7:58 PM Marce Almazan DO          ED Provider  Electronically Signed by           Cleave Nageotte, MD  08/14/20 0877

## 2020-08-13 NOTE — TELEPHONE ENCOUNTER
Please advise patient that Rx for Xanax was sent to her pharmacy  She may take prior to the MRI      Thanks

## 2020-08-13 NOTE — ED NOTES
Upon moving patient to Room 1, boyfriend demanding to know how much longer she will be here and he will not wait hours  Patient stating she feels better at this time and is ready to go back to her MRI  Dr Maryjo Avelar made aware       Kesha Villalobos, [address] [place] [address]  08/13/20 4067

## 2020-08-13 NOTE — DISCHARGE INSTRUCTIONS
For next 3 days:    Benedryl (Diphenhydramine) 25-50mg every 6 hours OR Zyrtec (cetrizine) 10mg once or twice daily (Either medicine over the counter)    Prednisone 40mg daily with food      Pepcid (famotidine) 40mg daily     Return to ED with rapidly spreading rash, lightheadedness, wheezing, trouble swallowing, tongue thickness, voice change, sudden sore throat or any new or worsening symptoms

## 2020-08-14 DIAGNOSIS — F40.240 CLAUSTROPHOBIA: Primary | ICD-10-CM

## 2020-08-17 NOTE — TELEPHONE ENCOUNTER
Patient made aware of above msg and verbalized understanding  She just wants to make sure no Xanax due to reaction  Thanks

## 2020-08-17 NOTE — TELEPHONE ENCOUNTER
Please advise patient that Rx will be sent to pharmacy prior to her 08/20/20 MRI  We will inform her when this is sent      Thanks

## 2020-08-17 NOTE — TELEPHONE ENCOUNTER
Patient is calling because she had an allergic reaction to the Xanax and was in the ER  She states that something else was supposed to be called into her phramacy for this

## 2020-08-18 DIAGNOSIS — F40.240 CLAUSTROPHOBIA: Primary | ICD-10-CM

## 2020-08-18 RX ORDER — HYDROXYZINE PAMOATE 50 MG/1
50 CAPSULE ORAL
Qty: 2 CAPSULE | Refills: 0 | Status: SHIPPED | OUTPATIENT
Start: 2020-08-18 | End: 2021-01-15

## 2020-08-20 ENCOUNTER — HOSPITAL ENCOUNTER (OUTPATIENT)
Dept: MRI IMAGING | Facility: HOSPITAL | Age: 55
Discharge: HOME | End: 2020-08-20
Payer: COMMERCIAL

## 2020-08-20 PROCEDURE — 73721 MRI JNT OF LWR EXTRE W/O DYE: CPT

## 2020-08-20 PROCEDURE — G1004 CDSM NDSC: HCPCS

## 2020-08-21 ENCOUNTER — OFFICE VISIT (OUTPATIENT)
Dept: OBGYN CLINIC | Facility: CLINIC | Age: 55
End: 2020-08-21
Payer: COMMERCIAL

## 2020-08-21 VITALS
HEIGHT: 70 IN | DIASTOLIC BLOOD PRESSURE: 82 MMHG | SYSTOLIC BLOOD PRESSURE: 126 MMHG | HEART RATE: 82 BPM | WEIGHT: 223 LBS | TEMPERATURE: 98.3 F | BODY MASS INDEX: 31.92 KG/M2

## 2020-08-21 DIAGNOSIS — M17.12 PRIMARY OSTEOARTHRITIS OF LEFT KNEE: Primary | ICD-10-CM

## 2020-08-21 PROCEDURE — 3079F DIAST BP 80-89 MM HG: CPT | Performed by: FAMILY MEDICINE

## 2020-08-21 PROCEDURE — 1036F TOBACCO NON-USER: CPT | Performed by: FAMILY MEDICINE

## 2020-08-21 PROCEDURE — 3008F BODY MASS INDEX DOCD: CPT | Performed by: FAMILY MEDICINE

## 2020-08-21 PROCEDURE — 99213 OFFICE O/P EST LOW 20 MIN: CPT | Performed by: FAMILY MEDICINE

## 2020-08-21 PROCEDURE — 3074F SYST BP LT 130 MM HG: CPT | Performed by: FAMILY MEDICINE

## 2020-08-21 PROCEDURE — 20610 DRAIN/INJ JOINT/BURSA W/O US: CPT | Performed by: FAMILY MEDICINE

## 2020-08-21 RX ORDER — LIDOCAINE HYDROCHLORIDE 10 MG/ML
4 INJECTION, SOLUTION INFILTRATION; PERINEURAL
Status: COMPLETED | OUTPATIENT
Start: 2020-08-21 | End: 2020-08-21

## 2020-08-21 RX ORDER — LIDOCAINE HYDROCHLORIDE 10 MG/ML
2 INJECTION, SOLUTION INFILTRATION; PERINEURAL
Status: COMPLETED | OUTPATIENT
Start: 2020-08-21 | End: 2020-08-21

## 2020-08-21 RX ORDER — TRIAMCINOLONE ACETONIDE 40 MG/ML
40 INJECTION, SUSPENSION INTRA-ARTICULAR; INTRAMUSCULAR
Status: COMPLETED | OUTPATIENT
Start: 2020-08-21 | End: 2020-08-21

## 2020-08-21 RX ADMIN — LIDOCAINE HYDROCHLORIDE 4 ML: 10 INJECTION, SOLUTION INFILTRATION; PERINEURAL at 11:05

## 2020-08-21 RX ADMIN — LIDOCAINE HYDROCHLORIDE 2 ML: 10 INJECTION, SOLUTION INFILTRATION; PERINEURAL at 11:05

## 2020-08-21 RX ADMIN — TRIAMCINOLONE ACETONIDE 40 MG: 40 INJECTION, SUSPENSION INTRA-ARTICULAR; INTRAMUSCULAR at 11:05

## 2020-08-21 NOTE — PROGRESS NOTES
Assessment/Plan:  Assessment/Plan   Diagnoses and all orders for this visit:    Primary osteoarthritis of left knee  -     Large joint arthrocentesis: L knee        40-year-old active female with left knee pain and swelling from fall injury on 07/11/2020  Discussed with patient MRI results, impression and plan  MRI of the left knee is noted for tricompartmental change with full-thickness cartilage loss throughout the patella and lateral trochlea, there is also full-thickness cartilage loss focally at the weight-bearing aspect medial femoral condyle  Discussed treatment in form of corticosteroid injection and supplements to which she agreed  I administered mixture of 4 cc 1% lidocaine and 1 cc Kenalog to left knee without complication  She is to start taking tumeric 500 mg twice daily and Osteo Bi-Flex triple strength twice daily  She will follow up with me in 4 weeks at which point she will be re-evaluated  If no improvement we will consider viscosupplementation  Subjective:   Patient ID: Roseanna Lara is a 60 y o  female  Chief Complaint   Patient presents with    Left Knee - Follow-up       40-year-old female following up for left knee pain and swelling of onset from fall injury on 07/11/2020  She was last seen by me 10 days ago at which point she was referred for MRI of the left knee  She reports having pain described as generalized to the knee but worse at the medial and lateral aspect, constant, aching and throbbing and sometimes sharp, fluctuating in intensity and worse with prolonged standing, non radiating, associated swelling, and improved resting  She has been using Voltaren gel intermittently which helps with her pain  Knee Pain   This is a new problem  The current episode started more than 1 month ago  The problem occurs constantly  The problem has been waxing and waning  Associated symptoms include arthralgias and joint swelling  Pertinent negatives include no numbness or weakness  The symptoms are aggravated by standing and walking  She has tried rest, acetaminophen, position changes and ice (Voltaren gel, knee brace) for the symptoms  The treatment provided mild relief  Review of Systems   Musculoskeletal: Positive for arthralgias and joint swelling  Neurological: Negative for weakness and numbness  Objective:  Vitals:    08/21/20 1013   BP: 126/82   Pulse: 82   Temp: 98 3 °F (36 8 °C)   Weight: 101 kg (223 lb)   Height: 5' 10" (1 778 m)     Left Knee Exam     Muscle Strength   The patient has normal left knee strength  Range of Motion   Extension: normal     Other   Swelling: none            Physical Exam  Vitals signs and nursing note reviewed  Constitutional:       General: She is not in acute distress  Appearance: She is well-developed  HENT:      Head: Normocephalic  Eyes:      Conjunctiva/sclera: Conjunctivae normal    Neck:      Trachea: No tracheal deviation  Cardiovascular:      Rate and Rhythm: Normal rate  Pulmonary:      Effort: Pulmonary effort is normal  No respiratory distress  Abdominal:      General: There is no distension  Skin:     General: Skin is warm and dry  Neurological:      Mental Status: She is alert and oriented to person, place, and time  Psychiatric:         Behavior: Behavior normal          I have personally reviewed pertinent films in PACS and my interpretation is Osteoarthritis of the left knee      Large joint arthrocentesis: L knee  Date/Time: 8/21/2020 11:05 AM  Consent given by: patient  Site marked: site marked  Timeout: Immediately prior to procedure a time out was called to verify the correct patient, procedure, equipment, support staff and site/side marked as required   Supporting Documentation  Indications: pain   Procedure Details  Location: knee - L knee  Preparation: Patient was prepped and draped in the usual sterile fashion  Needle size: 22 G  Ultrasound guidance: no  Approach: anterolateral  Medications administered: 2 mL lidocaine 1 %; 4 mL lidocaine 1 %; 40 mg triamcinolone acetonide 40 mg/mL    Patient tolerance: patient tolerated the procedure well with no immediate complications  Dressing:  Sterile dressing applied

## 2020-08-21 NOTE — LETTER
August 21, 2020     Patient: Jazzy Duggan   YOB: 1965   Date of Visit: 8/21/2020       To Whom it May Concern:    Makayla Araujo is under my professional care  She was seen in my office on 8/21/2020  She may return to work on 8/24/2020  If you have any questions or concerns, please don't hesitate to call           Sincerely,          [place] [facility] [employer], DO        CC: No Recipients

## 2020-09-28 DIAGNOSIS — J45.20 INTERMITTENT ASTHMA, UNSPECIFIED ASTHMA SEVERITY, UNSPECIFIED WHETHER COMPLICATED: ICD-10-CM

## 2020-09-28 RX ORDER — MONTELUKAST SODIUM 10 MG/1
TABLET ORAL
Qty: 30 TABLET | Refills: 0 | Status: SHIPPED | OUTPATIENT
Start: 2020-09-28 | End: 2020-10-26

## 2020-10-26 DIAGNOSIS — J45.20 INTERMITTENT ASTHMA, UNSPECIFIED ASTHMA SEVERITY, UNSPECIFIED WHETHER COMPLICATED: ICD-10-CM

## 2020-10-26 RX ORDER — MONTELUKAST SODIUM 10 MG/1
TABLET ORAL
Qty: 30 TABLET | Refills: 0 | Status: SHIPPED | OUTPATIENT
Start: 2020-10-26 | End: 2020-11-16

## 2020-11-16 DIAGNOSIS — J45.20 INTERMITTENT ASTHMA, UNSPECIFIED ASTHMA SEVERITY, UNSPECIFIED WHETHER COMPLICATED: ICD-10-CM

## 2020-11-16 RX ORDER — MONTELUKAST SODIUM 10 MG/1
TABLET ORAL
Qty: 30 TABLET | Refills: 0 | Status: SHIPPED | OUTPATIENT
Start: 2020-11-16 | End: 2021-01-04

## 2021-01-04 DIAGNOSIS — J45.20 INTERMITTENT ASTHMA, UNSPECIFIED ASTHMA SEVERITY, UNSPECIFIED WHETHER COMPLICATED: ICD-10-CM

## 2021-01-04 RX ORDER — MONTELUKAST SODIUM 10 MG/1
TABLET ORAL
Qty: 30 TABLET | Refills: 0 | Status: SHIPPED | OUTPATIENT
Start: 2021-01-04 | End: 2021-02-01

## 2021-01-15 ENCOUNTER — OFFICE VISIT (OUTPATIENT)
Dept: PULMONOLOGY | Facility: CLINIC | Age: 56
End: 2021-01-15
Payer: COMMERCIAL

## 2021-01-15 VITALS
SYSTOLIC BLOOD PRESSURE: 135 MMHG | HEART RATE: 76 BPM | BODY MASS INDEX: 32.75 KG/M2 | OXYGEN SATURATION: 95 % | WEIGHT: 228.8 LBS | DIASTOLIC BLOOD PRESSURE: 84 MMHG | TEMPERATURE: 97.4 F | HEIGHT: 70 IN

## 2021-01-15 DIAGNOSIS — E66.01 MORBIDLY OBESE (HCC): ICD-10-CM

## 2021-01-15 DIAGNOSIS — I50.22 CHRONIC SYSTOLIC CHF (CONGESTIVE HEART FAILURE) (HCC): ICD-10-CM

## 2021-01-15 DIAGNOSIS — J45.21 MILD INTERMITTENT ASTHMA WITH ACUTE EXACERBATION: Primary | ICD-10-CM

## 2021-01-15 DIAGNOSIS — F17.211 CIGARETTE NICOTINE DEPENDENCE IN REMISSION: ICD-10-CM

## 2021-01-15 DIAGNOSIS — L40.50 PSORIATIC ARTHRITIS (HCC): ICD-10-CM

## 2021-01-15 PROCEDURE — 94664 DEMO&/EVAL PT USE INHALER: CPT | Performed by: INTERNAL MEDICINE

## 2021-01-15 PROCEDURE — 99215 OFFICE O/P EST HI 40 MIN: CPT | Performed by: INTERNAL MEDICINE

## 2021-01-15 RX ORDER — FLUTICASONE FUROATE AND VILANTEROL 100; 25 UG/1; UG/1
1 POWDER RESPIRATORY (INHALATION) DAILY
Qty: 1 INHALER | Refills: 3 | Status: SHIPPED | OUTPATIENT
Start: 2021-01-15 | End: 2021-04-16

## 2021-01-15 RX ORDER — PREDNISONE 20 MG/1
40 TABLET ORAL DAILY
Qty: 5 TABLET | Refills: 0 | Status: SHIPPED | OUTPATIENT
Start: 2021-01-15 | End: 2021-04-16

## 2021-01-15 RX ORDER — MAGNESIUM 200 MG
1 TABLET ORAL DAILY
COMMUNITY

## 2021-01-15 NOTE — ASSESSMENT & PLAN NOTE
- Remains committed to abstinence from tobacco  -  Qualifies for screening CT  Declines at this time

## 2021-01-15 NOTE — PROGRESS NOTES
Pulmonary Follow Up Note   Ina Castle 60 y o  female MRN: [id number]  1/15/2021      Assessment and Plan:    Cigarette nicotine dependence in remission  - Remains committed to abstinence from tobacco  -  Qualifies for screening CT  Declines at this time  Mild intermittent asthma with acute exacerbation  Well controlled until recently  - Not wheezing on exam but complains of breathlessness and feeling tight  Suspect asthma is mildly exacerbated  May be secondary to uncontrolled arthritis  - Rx Prednisone 40mg x 5 days  - Cont  Montelukast nightly  - Trial Breo 100 mcg  Instructed to rinse mouth out after use  - I demonstrated proper inhaler technique  - Refuses Influenza vaccine  Chronic systolic CHF (congestive heart failure) ([facility])  Wt Readings from Last 3 Encounters:   01/15/21 104 kg (228 lb 12 8 oz)   08/21/20 101 kg (223 lb)   08/13/20 102 kg (225 lb)       - Mild edema b/l l/e  Planned to follow up with cardiology to adjust diuretic dosing  Psoriatic arthritis ([facility] )  -no known lung disease related to psoriatic arthritis  -exacerbation of her further smoothed be relieved with rest recent her asthma  Reason for asthma when she was initiated on Enbrel   -Rx steroids for asthma exacerbation and arthritis exacerbation  Morbidly obese ([address] )  -discussed weight loss  Discussed limiting calories and coming back on Gatorade intake  She is interested but frustrated by her current arthritis exacerbation and multiple health problems  States she cannot commit weight loss at this time  Diagnoses and all orders for this visit:    Mild intermittent asthma with acute exacerbation  -     predniSONE 20 mg tablet; Take 2 tablets (40 mg total) by mouth daily  -     fluticasone-vilanterol (BREO ELLIPTA) 100-25 mcg/inh inhaler; Inhale 1 puff daily Rinse mouth after use      Psoriatic arthritis ([address] )    Cigarette nicotine dependence in remission    Morbidly obese (HCC)    Chronic systolic CHF (congestive heart failure) ([facility] )    Other orders  -     Magnesium 200 MG TABS; Take 1 tablet by mouth daily  -     etanercept (ENBREL SURECLICK) 50 MG/ML injection; Inject 50 mg under the skin Once a week     Plan of care discussed with patient  Return for follow-up in 3 months  Call if needed sooner  History of Present Illness   HPI:  Ina Castle is a 60 y o  female who presents for follow up  She was last seen by us 1 year ago for her asthma  She states she was doing well until 1 5 months ago  Her Ирина Fraction was stopped for her psoriatic arthritis and she wonders if this is making her asthma worse  She is starting Enbrel on Monday  She has tried multiple inhalers and they did not help  She takes Montelukast nightly and usually it is helpful  Her asthma is triggered by dust and particles at work  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  Quit smoking 3 years ago  Smoked 1 ppd x 40 years  She weighed 155 pounds when she quit  She snacks at night when she gets home from work  She has been watching her weight and it swings  She had recent lower extremity edema  She is following with her cardiologists  She walks about 10,000 steps per day       Review of Systems  Positive as mentioned above and negative otherwise    Historical Information   Past Medical History:   Diagnosis Date    Allergic     Anxiety     [facility]     Benign hypertensive heart and CKD, stage 3 (GFR 30-59), w CHF ([facility] ) 9/5/2018    Depression     Heart attack ([facility] )     Hypertension     SOB (shortness of breath)     chronic     Past Surgical History:   Procedure Laterality Date    ABLATION COLPOCLESIS      ANGIOPLASTY Bilateral     stent placement behind knee    HYSTERECTOMY      KNEE SURGERY Left 1980    NECK SURGERY  2006    plate (C7)     Family History   Problem Relation Age of Onset    Osteoporosis Mother     Stroke Sister     Atrial fibrillation Sister     Cancer Brother Meds/Allergies     Current Outpatient Medications:     acetaminophen (TYLENOL) 500 mg tablet, Take 500 mg by mouth every 6 (six) hours as needed, Disp: , Rfl:     amLODIPine (NORVASC) 2 5 mg tablet, Take 5 mg by mouth daily, Disp: , Rfl:     Aspirin (ASPIR-81 PO), every 24 hours, Disp: , Rfl:     diclofenac sodium (VOLTAREN) 1 %, Apply 2 g topically 4 (four) times a day, Disp: , Rfl:     dofetilide (TIKOSYN) 250 mcg capsule, TAKE 1 CAPSULE BY MOUTH TWICE DAILY, Disp: , Rfl:     etanercept (ENBREL SURECLICK) 50 MG/ML injection, Inject 50 mg under the skin Once a week, Disp: , Rfl:     furosemide (LASIX) 40 mg tablet, Take 1 tablet by mouth see administration instructions 1 tablet on Mon, Wed, Fri 1/2 tablet on Tues, Thurs, Sat, Sun , Disp: , Rfl:     lisinopril (ZESTRIL) 10 mg tablet, Take 5 mg by mouth daily , Disp: , Rfl:     metoprolol succinate (TOPROL-XL) 25 mg 24 hr tablet, Take 25 mg by mouth 2 (two) times a day , Disp: , Rfl:     montelukast (SINGULAIR) 10 mg tablet, TAKE 1 TABLET BY MOUTH ONCE DAILY AT BEDTIME, Disp: 30 tablet, Rfl: 0    XARELTO 20 MG tablet, , Disp: , Rfl:     famotidine (PEPCID) 40 MG tablet, Take 1 tablet (40 mg total) by mouth daily for 3 days, Disp: 3 tablet, Rfl: 0    HUMIRA PEN 40 MG/0 4ML PNKT, , Disp: , Rfl:     hydrOXYzine pamoate (VISTARIL) 50 mg capsule, Take 1 capsule (50 mg total) by mouth 30 min pre-procedure May take 2nd dose 30mins after 1st dose if still anxious (Patient not taking: Reported on 1/15/2021), Disp: 2 capsule, Rfl: 0    levalbuterol (XOPENEX HFA) 45 mcg/act inhaler, Inhale 1-2 puffs every 4 (four) hours as needed for wheezing (Patient not taking: Reported on 1/15/2021), Disp: 1 Inhaler, Rfl: 5    Magnesium 200 MG TABS, Take 1 tablet by mouth daily, Disp: , Rfl:     Multiple Vitamins-Minerals (MULTIVITAMIN ADULT PO), Take 2 tablets by mouth, Disp: , Rfl:     predniSONE 20 mg tablet, 2 tabs twice daily (40mg) by mouth daily for 3 days (Patient not taking: Reported on 1/15/2021), Disp: 6 tablet, Rfl: 0    rosuvastatin (CRESTOR) 20 MG tablet, Take 20 mg by mouth, Disp: , Rfl:     sulfaSALAzine (AZULFIDINE) 500 mg tablet, Take by mouth 2 (two) times a day, Disp: , Rfl:     Current Facility-Administered Medications:     levalbuterol (XOPENEX HFA) inhaler 1 puff, 1 puff, Inhalation, Q6H PRN, Patti Simeon MD  Allergies   Allergen Reactions    Albuterol Tachycardia     Afib  Afib  Afib    Alprazolam Hives    Dust Mite Extract Shortness Of Breath    Umeclidinium-Vilanterol Tachycardia     Afib  Afib  Afib    Calcipotriene Rash       Vitals: Blood pressure 135/84, pulse 76, temperature (!) 97 4 °F (36 3 °C), temperature source Tympanic, height 5' 10" (1 778 m), weight 104 kg (228 lb 12 8 oz), SpO2 95 %  Body mass index is 32 83 kg/m²  Oxygen Therapy  SpO2: 95 %      Physical Exam  GEN: WDWN, nad, comfortable  HEENT: NCAT, EOMI  CVS: Regular, no m/r/g  LUNGS: CTA b/l, no w/r/r  ABD: soft, nd  EXT: No c/c  NEURO: No focal deficits  MS: Moving all extremities  SKIN: warm, dry  PSYCH: calm, cooperative      Labs: I have personally reviewed pertinent lab results  Lab Results   Component Value Date    WBC 5 50 08/06/2018    HGB 12 2 08/06/2018    HCT 37 5 08/06/2018    MCV 95 08/06/2018     08/06/2018     Lab Results   Component Value Date    GLUCOSE 100 03/02/2015    CALCIUM 9 5 09/27/2018     03/02/2015    K 4 0 09/27/2018    CO2 26 09/27/2018     09/27/2018    BUN 20 09/27/2018    CREATININE 1 18 09/27/2018     No results found for: IGE  Lab Results   Component Value Date    ALT 31 08/06/2018    AST 19 08/06/2018    ALKPHOS 55 08/06/2018    BILITOT 0 7 03/02/2015       Labs per my interpretation show the elevated creatinine, stable anemia    Pulmonary function testing:  Positive methacholine challenge test 2019  PFTs 2018 per my interpretation show normal spirometry, normal lung volumes, normal diffusion      Mariza L  SHA [facility]

## 2021-01-15 NOTE — ASSESSMENT & PLAN NOTE
-no known lung disease related to psoriatic arthritis  -exacerbation of her further smoothed be relieved with rest recent her asthma  Reason for asthma when she was initiated on Enbrel   -Rx steroids for asthma exacerbation and arthritis exacerbation

## 2021-01-15 NOTE — ASSESSMENT & PLAN NOTE
Wt Readings from Last 3 Encounters:   01/15/21 104 kg (228 lb 12 8 oz)   08/21/20 101 kg (223 lb)   08/13/20 102 kg (225 lb)       - Mild edema b/l l/e  Planned to follow up with cardiology to adjust diuretic dosing

## 2021-01-15 NOTE — ASSESSMENT & PLAN NOTE
Well controlled until recently  - Not wheezing on exam but complains of breathlessness and feeling tight  Suspect asthma is mildly exacerbated  May be secondary to uncontrolled arthritis  - Rx Prednisone 40mg x 5 days  - Cont  Montelukast nightly  - Trial Breo 100 mcg  Instructed to rinse mouth out after use  - I demonstrated proper inhaler technique  - Refuses Influenza vaccine

## 2021-01-15 NOTE — ASSESSMENT & PLAN NOTE
-discussed weight loss  Discussed limiting calories and coming back on Gatorade intake  She is interested but frustrated by her current arthritis exacerbation and multiple health problems  States she cannot commit weight loss at this time

## 2021-02-01 DIAGNOSIS — J45.20 INTERMITTENT ASTHMA, UNSPECIFIED ASTHMA SEVERITY, UNSPECIFIED WHETHER COMPLICATED: ICD-10-CM

## 2021-02-01 RX ORDER — MONTELUKAST SODIUM 10 MG/1
TABLET ORAL
Qty: 30 TABLET | Refills: 0 | Status: SHIPPED | OUTPATIENT
Start: 2021-02-01 | End: 2021-03-01

## 2021-03-01 DIAGNOSIS — J45.20 INTERMITTENT ASTHMA, UNSPECIFIED ASTHMA SEVERITY, UNSPECIFIED WHETHER COMPLICATED: ICD-10-CM

## 2021-03-01 RX ORDER — MONTELUKAST SODIUM 10 MG/1
TABLET ORAL
Qty: 30 TABLET | Refills: 0 | Status: SHIPPED | OUTPATIENT
Start: 2021-03-01 | End: 2021-03-29

## 2021-03-29 DIAGNOSIS — J45.20 INTERMITTENT ASTHMA, UNSPECIFIED ASTHMA SEVERITY, UNSPECIFIED WHETHER COMPLICATED: ICD-10-CM

## 2021-03-29 RX ORDER — MONTELUKAST SODIUM 10 MG/1
TABLET ORAL
Qty: 30 TABLET | Refills: 5 | Status: SHIPPED | OUTPATIENT
Start: 2021-03-29 | End: 2021-09-20

## 2021-04-16 ENCOUNTER — OFFICE VISIT (OUTPATIENT)
Dept: PULMONOLOGY | Facility: CLINIC | Age: 56
End: 2021-04-16
Payer: COMMERCIAL

## 2021-04-16 VITALS
HEIGHT: 70 IN | SYSTOLIC BLOOD PRESSURE: 122 MMHG | OXYGEN SATURATION: 95 % | TEMPERATURE: 98.1 F | WEIGHT: 221 LBS | BODY MASS INDEX: 31.64 KG/M2 | DIASTOLIC BLOOD PRESSURE: 72 MMHG | HEART RATE: 81 BPM

## 2021-04-16 DIAGNOSIS — E66.01 MORBIDLY OBESE (HCC): ICD-10-CM

## 2021-04-16 DIAGNOSIS — I48.19 PERSISTENT ATRIAL FIBRILLATION (HCC): ICD-10-CM

## 2021-04-16 DIAGNOSIS — I50.22 CHRONIC SYSTOLIC CHF (CONGESTIVE HEART FAILURE) (HCC): ICD-10-CM

## 2021-04-16 DIAGNOSIS — J43.2 CENTRILOBULAR EMPHYSEMA (HCC): Chronic | ICD-10-CM

## 2021-04-16 DIAGNOSIS — U07.1 COVID-19: ICD-10-CM

## 2021-04-16 DIAGNOSIS — L40.50 PSORIATIC ARTHRITIS (HCC): ICD-10-CM

## 2021-04-16 DIAGNOSIS — F17.211 CIGARETTE NICOTINE DEPENDENCE IN REMISSION: ICD-10-CM

## 2021-04-16 DIAGNOSIS — J45.21 MILD INTERMITTENT ASTHMA WITH ACUTE EXACERBATION: Primary | ICD-10-CM

## 2021-04-16 PROBLEM — I50.20 SYSTOLIC HEART FAILURE (HCC): Status: RESOLVED | Noted: 2018-01-24 | Resolved: 2021-04-16

## 2021-04-16 PROBLEM — I48.0 PAROXYSMAL ATRIAL FIBRILLATION (HCC): Status: RESOLVED | Noted: 2018-02-23 | Resolved: 2021-04-16

## 2021-04-16 PROCEDURE — 99214 OFFICE O/P EST MOD 30 MIN: CPT | Performed by: INTERNAL MEDICINE

## 2021-04-16 NOTE — PROGRESS NOTES
Pulmonary Follow Up Note   Mozella Angelucci 60 y o  female MRN: [id number]  4/16/2021        Assessment and Plan:    Cigarette nicotine dependence in remission  - remains committed to abstinence  - declines screening CT scan    Morbidly obese (HCC)  - cont  Weight loss efforts with calorie limitation, try to avoid late night eating  Persistent atrial fibrillation (HCC)  - cont  Xarelto and beta blocker    Chronic systolic CHF (congestive heart failure) (HCC)  Wt Readings from Last 3 Encounters:   04/16/21 100 kg (221 lb)   01/15/21 104 kg (228 lb 12 8 oz)   08/21/20 101 kg (223 lb)       Mild edema  Has been maintained on Furosemide  Weight down due to efforts to lose weight  - cont  BP control  Mild intermittent asthma with acute exacerbation  - Remains controlled since her exacerbation in January  Has been off of controller medications  - Sample Breo 100 provided  Instructed to rinse after use  - Cont  Montelukast nightly  - Declines Influenza vaccine   - Received COVID vaccine x 1  Psoriatic arthritis ([facility] [address] [facility] )  - No known lung disease  - Cont  Enbrel per rheumatology    COVID-19  - Slowly recovering   - no known pneumonia  No rales on exam  - encouraged her to increase her activity level as able  - Discussed option of pulmonary rehab  If her activity level does not improve or worsens, will check CXR and refer to rehab, but hopeful it will continue to improve on its own  Pulmonary emphysema (HCC)  Quit smoking  No obstruction on PFTs  Diagnoses and all orders for this visit:    Mild intermittent asthma with acute exacerbation    COVID-19    Morbidly obese (HCC)    Chronic systolic CHF (congestive heart failure) (HCC)    Cigarette nicotine dependence in remission    Centrilobular emphysema (HCC)    Persistent atrial fibrillation (HCC)    [facility] ([facility] )    Plan of care discussed in detail with patient  Questions and concerns addressed  She will return for follow-up in October    If she finds relief with Mirza Bentley, will call Rx to pharmacy  History of Present Illness   HPI:  Roseanna Lara is a 60 y o  female who presents for follow up of her asthma  She was last seen by me in January  Since then, she reports that she did not fill the inhaler because it was too expensive  She had COVID March 8th  Did not require hospitalization  Since having COVID, she is more SOB with exertion  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  She returned to work this week and noticed these symptoms  She is unsure if she is getting better because she was not in tune with her symptoms at home  Her boyfriend also had COVID  Was hospitalized and very sick, now on home oxygen  Appetite is OK, senses of taste and smell returned  Weight is stable - lost few pounds being sick and she is trying to keep it off  She works until [employer] and eats in the middle of the morning  This is hard to maintain healthy eating habits  She changed to zero calorie Gatorade  She received COVID vaccine last week  No asthma exacerbations since January when I treated her in the office  Not sure what triggers asthma  She is back on her Enbrel      Review of Systems  Positive as mentioned above and negative otherwise    Historical Information   Past Medical History:   Diagnosis Date    Allergic     Anxiety     Atrial fibrillation ([address] )     Benign hypertensive heart and CKD, stage 3 (GFR 30-59), w CHF ([address] ) 9/5/2018    COVID-19     Depression     Heart attack ([address] )     Hypertension     Psoriatic arthritis ([address] )     SOB (shortness of breath)     chronic     Past Surgical History:   Procedure Laterality Date    ABLATION COLPOCLESIS      ANGIOPLASTY Bilateral     stent placement behind knee    HYSTERECTOMY      KNEE SURGERY Left 1980    NECK SURGERY  2006    plate (C7)     Family History   Problem Relation Age of Onset    Osteoporosis Mother     Stroke Sister     Atrial fibrillation Sister     Cancer Brother          Meds/Allergies     Current Outpatient Medications:     acetaminophen (TYLENOL) 500 mg tablet, Take 500 mg by mouth every 6 (six) hours as needed, Disp: , Rfl:     amLODIPine (NORVASC) 2 5 mg tablet, Take 5 mg by mouth daily, Disp: , Rfl:     Aspirin (ASPIR-81 PO), every 24 hours, Disp: , Rfl:     diclofenac sodium (VOLTAREN) 1 %, Apply 2 g topically 4 (four) times a day, Disp: , Rfl:     dofetilide (TIKOSYN) 250 mcg capsule, TAKE 1 CAPSULE BY MOUTH TWICE DAILY, Disp: , Rfl:     etanercept (ENBREL SURECLICK) 50 MG/ML injection, Inject 50 mg under the skin Once a week, Disp: , Rfl:     furosemide (LASIX) 40 mg tablet, Take 1 tablet by mouth see administration instructions 1 tablet on Mon, Wed, Fri 1/2 tablet on Tues, Thurs, Sat, Sun , Disp: , Rfl:     lisinopril (ZESTRIL) 10 mg tablet, Take 5 mg by mouth daily , Disp: , Rfl:     Magnesium 200 MG TABS, Take 1 tablet by mouth daily, Disp: , Rfl:     metoprolol succinate (TOPROL-XL) 25 mg 24 hr tablet, Take 25 mg by mouth 2 (two) times a day , Disp: , Rfl:     montelukast (SINGULAIR) 10 mg tablet, TAKE 1 TABLET BY MOUTH ONCE DAILY AT BEDTIME, Disp: 30 tablet, Rfl: 5    rosuvastatin (CRESTOR) 20 MG tablet, Take 20 mg by mouth, Disp: , Rfl:     XARELTO 20 MG tablet, , Disp: , Rfl:     famotidine (PEPCID) 40 MG tablet, Take 1 tablet (40 mg total) by mouth daily for 3 days, Disp: 3 tablet, Rfl: 0    fluticasone-vilanterol (BREO ELLIPTA) 100-25 mcg/inh inhaler, Inhale 1 puff daily Rinse mouth after use   (Patient not taking: Reported on 4/16/2021), Disp: 1 Inhaler, Rfl: 3    HUMIRA PEN 40 MG/0 4ML PNKT, , Disp: , Rfl:     Multiple Vitamins-Minerals (MULTIVITAMIN ADULT PO), Take 2 tablets by mouth, Disp: , Rfl:     predniSONE 20 mg tablet, Take 2 tablets (40 mg total) by mouth daily (Patient not taking: Reported on 4/16/2021), Disp: 5 tablet, Rfl: 0    sulfaSALAzine (AZULFIDINE) 500 mg tablet, Take by mouth 2 (two) times a day, Disp: , Rfl: Current Facility-Administered Medications:     levalbuterol (XOPENEX HFA) inhaler 1 puff, 1 puff, Inhalation, Q6H PRN, Sis Dillon MD  Allergies   Allergen Reactions    Albuterol Tachycardia     Afib  Afib  Afib    Alprazolam Hives    Dust Mite Extract Shortness Of Breath    Umeclidinium-Vilanterol Tachycardia     Afib  Afib  Afib    Calcipotriene Rash       Vitals: Blood pressure 122/72, pulse 81, temperature 98 1 °F (36 7 °C), temperature source Tympanic, height 5' 10" (1 778 m), weight 100 kg (221 lb), SpO2 95 %  Body mass index is 31 71 kg/m²  Oxygen Therapy  SpO2: 95 %  Oxygen Therapy: None (Room air)      Physical Exam    GEN: WDWN, nad, comfortable  HEENT: NCAT, EOMI  CVS: Regular, no m/r/g  LUNGS: CTA b/l, no w/r/r  ABD: soft, nd, nt  EXT: No c/c +mild b/l l/e edema  NEURO: No focal deficits  MS: Moving all extremities  SKIN: warm, dry  PSYCH: calm, cooperative      Labs: I have personally reviewed pertinent lab results  Lab Results   Component Value Date    WBC 5 50 2018    HGB 12 2 2018    HCT 37 5 2018    MCV 95 2018     2018     Lab Results   Component Value Date    GLUCOSE 100 2015    CALCIUM 9 5 2018     2015    K 4 0 2018    CO2 26 2018     2018    BUN 20 2018    CREATININE 1 18 2018     No results found for: IGE  Lab Results   Component Value Date    ALT 31 2018    AST 19 2018    ALKPHOS 55 2018    BILITOT 0 7 2015       Labs per my interpretation show normal hemoglobin, elevated creatinine    Pulmonary function testin2018: Normal spirometry, normal volumes, normal diffusion    SHA [facility]

## 2021-04-16 NOTE — ASSESSMENT & PLAN NOTE
- Remains controlled since her exacerbation in January  Has been off of controller medications  - Sample Breo 100 provided  Instructed to rinse after use  - Cont  Montelukast nightly  - Declines Influenza vaccine   - Received COVID vaccine x 1

## 2021-04-16 NOTE — ASSESSMENT & PLAN NOTE
- Slowly recovering   - no known pneumonia  No rales on exam  - encouraged her to increase her activity level as able  - Discussed option of pulmonary rehab  If her activity level does not improve or worsens, will check CXR and refer to rehab, but hopeful it will continue to improve on its own

## 2021-04-16 NOTE — ASSESSMENT & PLAN NOTE
Wt Readings from Last 3 Encounters:   04/16/21 100 kg (221 lb)   01/15/21 104 kg (228 lb 12 8 oz)   08/21/20 101 kg (223 lb)       Mild edema  Has been maintained on Furosemide  Weight down due to efforts to lose weight  - cont  BP control

## 2021-05-27 DIAGNOSIS — J45.20 MILD INTERMITTENT ASTHMA WITHOUT COMPLICATION: Primary | ICD-10-CM

## 2021-05-27 RX ORDER — FLUTICASONE FUROATE AND VILANTEROL 100; 25 UG/1; UG/1
1 POWDER RESPIRATORY (INHALATION) DAILY
Qty: 60 EACH | Refills: 5 | Status: SHIPPED | OUTPATIENT
Start: 2021-05-27 | End: 2021-10-04

## 2021-09-20 DIAGNOSIS — J45.20 INTERMITTENT ASTHMA, UNSPECIFIED ASTHMA SEVERITY, UNSPECIFIED WHETHER COMPLICATED: ICD-10-CM

## 2021-09-20 RX ORDER — MONTELUKAST SODIUM 10 MG/1
TABLET ORAL
Qty: 30 TABLET | Refills: 0 | Status: SHIPPED | OUTPATIENT
Start: 2021-09-20 | End: 2021-10-22

## 2021-10-04 ENCOUNTER — OFFICE VISIT (OUTPATIENT)
Dept: PULMONOLOGY | Facility: CLINIC | Age: 56
End: 2021-10-04
Payer: COMMERCIAL

## 2021-10-04 VITALS
SYSTOLIC BLOOD PRESSURE: 109 MMHG | WEIGHT: 211.8 LBS | HEART RATE: 63 BPM | BODY MASS INDEX: 30.32 KG/M2 | TEMPERATURE: 96.6 F | HEIGHT: 70 IN | DIASTOLIC BLOOD PRESSURE: 72 MMHG | OXYGEN SATURATION: 97 %

## 2021-10-04 DIAGNOSIS — E66.09 CLASS 1 OBESITY DUE TO EXCESS CALORIES WITH SERIOUS COMORBIDITY AND BODY MASS INDEX (BMI) OF 30.0 TO 30.9 IN ADULT: ICD-10-CM

## 2021-10-04 DIAGNOSIS — J43.2 CENTRILOBULAR EMPHYSEMA (HCC): Primary | Chronic | ICD-10-CM

## 2021-10-04 DIAGNOSIS — I48.19 PERSISTENT ATRIAL FIBRILLATION (HCC): ICD-10-CM

## 2021-10-04 DIAGNOSIS — F17.211 CIGARETTE NICOTINE DEPENDENCE IN REMISSION: ICD-10-CM

## 2021-10-04 DIAGNOSIS — L40.50 PSORIATIC ARTHRITIS (HCC): ICD-10-CM

## 2021-10-04 DIAGNOSIS — J45.21 MILD INTERMITTENT ASTHMA WITH ACUTE EXACERBATION: ICD-10-CM

## 2021-10-04 PROBLEM — I13.0 BENIGN HYPERTENSIVE HEART AND CKD, STAGE 3 (GFR 30-59), W CHF (HCC): Chronic | Status: RESOLVED | Noted: 2018-09-05 | Resolved: 2021-10-04

## 2021-10-04 PROBLEM — E66.811 CLASS 1 OBESITY DUE TO EXCESS CALORIES WITH SERIOUS COMORBIDITY AND BODY MASS INDEX (BMI) OF 30.0 TO 30.9 IN ADULT: Status: ACTIVE | Noted: 2021-01-15

## 2021-10-04 PROBLEM — N18.30 BENIGN HYPERTENSIVE HEART AND CKD, STAGE 3 (GFR 30-59), W CHF (HCC): Chronic | Status: RESOLVED | Noted: 2018-09-05 | Resolved: 2021-10-04

## 2021-10-04 PROCEDURE — 99214 OFFICE O/P EST MOD 30 MIN: CPT | Performed by: INTERNAL MEDICINE

## 2021-10-04 RX ORDER — FLUTICASONE FUROATE, UMECLIDINIUM BROMIDE AND VILANTEROL TRIFENATATE 200; 62.5; 25 UG/1; UG/1; UG/1
1 POWDER RESPIRATORY (INHALATION) DAILY
Qty: 60 BLISTER | Refills: 3 | Status: SHIPPED | OUTPATIENT
Start: 2021-10-04 | End: 2021-10-12 | Stop reason: ALTCHOICE

## 2021-10-04 RX ORDER — ALBUTEROL SULFATE 90 UG/1
2 AEROSOL, METERED RESPIRATORY (INHALATION) EVERY 6 HOURS PRN
Qty: 18 G | Refills: 3 | Status: SHIPPED | OUTPATIENT
Start: 2021-10-04

## 2021-10-12 DIAGNOSIS — J45.20 MILD INTERMITTENT ASTHMA, UNSPECIFIED WHETHER COMPLICATED: Primary | ICD-10-CM

## 2021-10-12 RX ORDER — FLUTICASONE PROPIONATE AND SALMETEROL XINAFOATE 115; 21 UG/1; UG/1
2 AEROSOL, METERED RESPIRATORY (INHALATION) 2 TIMES DAILY
Qty: 12 G | Refills: 5 | Status: SHIPPED | OUTPATIENT
Start: 2021-10-12

## 2021-10-13 ENCOUNTER — TELEPHONE (OUTPATIENT)
Dept: PULMONOLOGY | Facility: CLINIC | Age: 56
End: 2021-10-13

## 2021-10-22 DIAGNOSIS — J45.20 INTERMITTENT ASTHMA, UNSPECIFIED ASTHMA SEVERITY, UNSPECIFIED WHETHER COMPLICATED: ICD-10-CM

## 2021-10-22 RX ORDER — MONTELUKAST SODIUM 10 MG/1
TABLET ORAL
Qty: 30 TABLET | Refills: 0 | Status: SHIPPED | OUTPATIENT
Start: 2021-10-22 | End: 2021-11-26

## 2021-11-26 DIAGNOSIS — J45.20 INTERMITTENT ASTHMA, UNSPECIFIED ASTHMA SEVERITY, UNSPECIFIED WHETHER COMPLICATED: ICD-10-CM

## 2021-11-26 RX ORDER — MONTELUKAST SODIUM 10 MG/1
TABLET ORAL
Qty: 30 TABLET | Refills: 0 | Status: SHIPPED | OUTPATIENT
Start: 2021-11-26 | End: 2021-12-16

## 2021-12-16 DIAGNOSIS — J45.20 INTERMITTENT ASTHMA, UNSPECIFIED ASTHMA SEVERITY, UNSPECIFIED WHETHER COMPLICATED: ICD-10-CM

## 2021-12-16 RX ORDER — MONTELUKAST SODIUM 10 MG/1
TABLET ORAL
Qty: 30 TABLET | Refills: 0 | Status: SHIPPED | OUTPATIENT
Start: 2021-12-16 | End: 2021-12-23

## 2021-12-23 DIAGNOSIS — J45.20 INTERMITTENT ASTHMA, UNSPECIFIED ASTHMA SEVERITY, UNSPECIFIED WHETHER COMPLICATED: ICD-10-CM

## 2021-12-23 RX ORDER — MONTELUKAST SODIUM 10 MG/1
TABLET ORAL
Qty: 30 TABLET | Refills: 0 | Status: SHIPPED | OUTPATIENT
Start: 2021-12-23 | End: 2022-02-21

## 2022-02-16 ENCOUNTER — OFFICE VISIT (OUTPATIENT)
Dept: OBGYN CLINIC | Facility: CLINIC | Age: 57
End: 2022-02-16
Payer: COMMERCIAL

## 2022-02-16 VITALS — BODY MASS INDEX: 29.78 KG/M2 | WEIGHT: 208 LBS | HEIGHT: 70 IN

## 2022-02-16 DIAGNOSIS — M17.12 PRIMARY OSTEOARTHRITIS OF LEFT KNEE: Primary | ICD-10-CM

## 2022-02-16 PROCEDURE — 99213 OFFICE O/P EST LOW 20 MIN: CPT | Performed by: FAMILY MEDICINE

## 2022-02-16 PROCEDURE — 20610 DRAIN/INJ JOINT/BURSA W/O US: CPT | Performed by: FAMILY MEDICINE

## 2022-02-16 RX ADMIN — LIDOCAINE HYDROCHLORIDE 3 ML: 10 INJECTION, SOLUTION INFILTRATION; PERINEURAL at 13:53

## 2022-02-16 RX ADMIN — BUPIVACAINE HYDROCHLORIDE 2 ML: 2.5 INJECTION, SOLUTION INFILTRATION; PERINEURAL at 13:53

## 2022-02-16 RX ADMIN — TRIAMCINOLONE ACETONIDE 40 MG: 40 INJECTION, SUSPENSION INTRA-ARTICULAR; INTRAMUSCULAR at 13:53

## 2022-02-16 RX ADMIN — LIDOCAINE HYDROCHLORIDE 2 ML: 10 INJECTION, SOLUTION INFILTRATION; PERINEURAL at 13:53

## 2022-02-16 RX ADMIN — TRIAMCINOLONE ACETONIDE 20 MG: 40 INJECTION, SUSPENSION INTRA-ARTICULAR; INTRAMUSCULAR at 13:53

## 2022-02-16 NOTE — PROGRESS NOTES
Assessment/Plan:  Assessment/Plan   Diagnoses and all orders for this visit:    Primary osteoarthritis of left knee  -     Diclofenac Sodium (VOLTAREN) 1 %; Apply 2 g topically 3 (three) times a day as needed (Pain)  -     Large joint arthrocentesis: L knee        41-year-old active female with osteoarthritis of left knee with left knee pain more than 18 months duration  Discussed with patient physical exam, impression, and plan  Physical exam of left knee noted for mild swelling  She has tenderness at the medial and lateral joint line  She has extension limited to -5° and flexion to 110°  There is no appreciable collateral ligament laxity  There is positive patellar inhibition and grind  Clinical impression is that she is symptomatic from degenerative changes of the knee  I discussed treatment option of corticosteroid injection and topical anti-inflammatory to which she agreed  I administered mixture of 2 cc 1% lidocaine, 2 cc 0 25% bupivacaine, and 1 5 cc Kenalog to the left knee without complication  She may continue applying topical diclofenac gel 3 to 4 times a day as needed  She will follow up with me as needed  Subjective:   Patient ID: Maren Lara is a 59 y o  female  Chief Complaint   Patient presents with    Left Knee - Pain, Follow-up       41-year-old active female with osteoarthritis of left knee following up for left knee pain of more than 18 months duration  She was last seen by me 16 months ago at which point MRI reviewed was noted for degenerative changes  She was given corticosteroid injection and advised on taking supplements  She reports that injection provided significant improvement pain that lasted up until 3 months ago  Since that time pain has been gradually worsening  Pain described as generalized to the knee worse at the lateral aspect, nonradiating, worse with prolonged standing and ambulation, associated with swelling, and improved with resting    She applies topical diclofenac gel to help with pain and also takes Tylenol  Knee Pain  This is a chronic problem  The current episode started more than 1 year ago  The problem occurs daily  The problem has been gradually worsening  Associated symptoms include arthralgias and joint swelling  Pertinent negatives include no numbness or weakness  The symptoms are aggravated by standing and walking  She has tried rest and acetaminophen (Topical diclofenac) for the symptoms  The treatment provided mild relief  Review of Systems   Musculoskeletal: Positive for arthralgias and joint swelling  Neurological: Negative for weakness and numbness  Objective:  Vitals:    02/16/22 1310   Weight: 94 3 kg (208 lb)   Height: 5' 10" (1 778 m)     Left Knee Exam     Muscle Strength   The patient has normal left knee strength  Tenderness   The patient is experiencing tenderness in the lateral joint line and medial joint line  Range of Motion   Extension: -5   Flexion: 110     Other   Swelling: mild    Comments:  Positive patellar inhibition and grind              Physical Exam  Vitals and nursing note reviewed  Constitutional:       General: She is not in acute distress  Appearance: She is well-developed  She is not ill-appearing or diaphoretic  HENT:      Head: Normocephalic  Right Ear: External ear normal       Left Ear: External ear normal    Eyes:      Conjunctiva/sclera: Conjunctivae normal    Neck:      Trachea: No tracheal deviation  Pulmonary:      Effort: Pulmonary effort is normal  No respiratory distress  Abdominal:      General: There is no distension  Musculoskeletal:         General: Swelling and tenderness present  No deformity or signs of injury  Skin:     General: Skin is warm and dry  Coloration: Skin is not jaundiced or pale  Neurological:      Mental Status: She is alert and oriented to person, place, and time     Psychiatric:         Mood and Affect: Mood normal  Behavior: Behavior normal          Thought Content: Thought content normal          Judgment: Judgment normal          Large joint arthrocentesis: L knee  Universal Protocol:  Consent: Verbal consent obtained  Risks and benefits: risks, benefits and alternatives were discussed  Consent given by: patient  Time out: Immediately prior to procedure a "time out" was called to verify the correct patient, procedure, equipment, support staff and site/side marked as required  Patient understanding: patient states understanding of the procedure being performed  Patient consent: the patient's understanding of the procedure matches consent given  Procedure consent: procedure consent matches procedure scheduled  Relevant documents: relevant documents present and verified  Test results: test results available and properly labeled  Site marked: the operative site was marked  Radiology Images displayed and confirmed   If images not available, report reviewed: imaging studies available  Required items: required blood products, implants, devices, and special equipment available  Patient identity confirmed: verbally with patient    Supporting Documentation  Indications: pain   Procedure Details  Location: knee - L knee  Preparation: Patient was prepped and draped in the usual sterile fashion  Needle gauge: 21G 2"  Ultrasound guidance: no  Approach: anterolateral  Medications administered: 2 mL bupivacaine 0 25 %; 2 mL lidocaine 1 %; 3 mL lidocaine 1 %; 40 mg triamcinolone acetonide 40 mg/mL; 20 mg triamcinolone acetonide 40 mg/mL    Patient tolerance: patient tolerated the procedure well with no immediate complications  Dressing:  Sterile dressing applied

## 2022-02-17 RX ORDER — LIDOCAINE HYDROCHLORIDE 10 MG/ML
2 INJECTION, SOLUTION INFILTRATION; PERINEURAL
Status: COMPLETED | OUTPATIENT
Start: 2022-02-16 | End: 2022-02-16

## 2022-02-17 RX ORDER — TRIAMCINOLONE ACETONIDE 40 MG/ML
20 INJECTION, SUSPENSION INTRA-ARTICULAR; INTRAMUSCULAR
Status: COMPLETED | OUTPATIENT
Start: 2022-02-16 | End: 2022-02-16

## 2022-02-17 RX ORDER — TRIAMCINOLONE ACETONIDE 40 MG/ML
40 INJECTION, SUSPENSION INTRA-ARTICULAR; INTRAMUSCULAR
Status: COMPLETED | OUTPATIENT
Start: 2022-02-16 | End: 2022-02-16

## 2022-02-17 RX ORDER — BUPIVACAINE HYDROCHLORIDE 2.5 MG/ML
2 INJECTION, SOLUTION INFILTRATION; PERINEURAL
Status: COMPLETED | OUTPATIENT
Start: 2022-02-16 | End: 2022-02-16

## 2022-02-17 RX ORDER — LIDOCAINE HYDROCHLORIDE 10 MG/ML
3 INJECTION, SOLUTION INFILTRATION; PERINEURAL
Status: COMPLETED | OUTPATIENT
Start: 2022-02-16 | End: 2022-02-16

## 2022-02-20 DIAGNOSIS — J45.20 INTERMITTENT ASTHMA, UNSPECIFIED ASTHMA SEVERITY, UNSPECIFIED WHETHER COMPLICATED: ICD-10-CM

## 2022-02-21 RX ORDER — MONTELUKAST SODIUM 10 MG/1
TABLET ORAL
Qty: 30 TABLET | Refills: 0 | Status: SHIPPED | OUTPATIENT
Start: 2022-02-21 | End: 2022-03-28

## 2022-02-25 ENCOUNTER — HOSPITAL ENCOUNTER (EMERGENCY)
Facility: HOSPITAL | Age: 57
Discharge: HOME | End: 2022-02-25
Attending: EMERGENCY MEDICINE | Admitting: EMERGENCY MEDICINE
Payer: COMMERCIAL

## 2022-02-25 ENCOUNTER — APPOINTMENT (EMERGENCY)
Dept: RADIOLOGY | Facility: HOSPITAL | Age: 57
End: 2022-02-25
Payer: COMMERCIAL

## 2022-02-25 VITALS
DIASTOLIC BLOOD PRESSURE: 74 MMHG | OXYGEN SATURATION: 98 % | HEART RATE: 87 BPM | WEIGHT: 202 LBS | BODY MASS INDEX: 28.92 KG/M2 | RESPIRATION RATE: 18 BRPM | TEMPERATURE: 98 F | HEIGHT: 70 IN | SYSTOLIC BLOOD PRESSURE: 140 MMHG

## 2022-02-25 DIAGNOSIS — S63.501A WRIST SPRAIN, RIGHT, INITIAL ENCOUNTER: Primary | ICD-10-CM

## 2022-02-25 PROCEDURE — 99284 EMERGENCY DEPT VISIT MOD MDM: CPT | Performed by: EMERGENCY MEDICINE

## 2022-02-25 PROCEDURE — 73110 X-RAY EXAM OF WRIST: CPT

## 2022-02-25 PROCEDURE — 99283 EMERGENCY DEPT VISIT LOW MDM: CPT

## 2022-02-25 RX ORDER — ACETAMINOPHEN 325 MG/1
650 TABLET ORAL ONCE
Status: COMPLETED | OUTPATIENT
Start: 2022-02-25 | End: 2022-02-25

## 2022-02-25 RX ADMIN — ACETAMINOPHEN 650 MG: 325 TABLET, FILM COATED ORAL at 20:21

## 2022-02-25 NOTE — Clinical Note
Jeri White was seen and treated in our emergency department on 2/25/2022  No work until cleared by Family Doctor/Orthopedics        Diagnosis:     Terrall Meckel    She may return on this date: If you have any questions or concerns, please don't hesitate to call        Alma Araiza MD    ______________________________           _______________          _______________  Hospital Representative                              Date                                Time

## 2022-02-26 NOTE — ED PROVIDER NOTES
History  Chief Complaint   Patient presents with    Wrist Injury     patient tripped over her own foot and fell on her right wrist      58-year-old left-hand dominant female with history of coronary artery disease AFib status post ablation hypertension psoriatic arthritis on Enbrel and left knee osteoarthritis presents with right wrist pain  She sustained a fall on the outstretched right hand; she tripped over her own 2 ft  She denies hitting her head she denies any other injury she has no new numbness or tingling she is denying any shoulder elbow pain there is no pain to her hand no weakness she has increasing pain to the right wrist with range of motion she complains of pain just distal to the ulnar stylette  Prior to Admission Medications   Prescriptions Last Dose Informant Patient Reported? Taking?    Aspirin (ASPIR-81 PO)  Self Yes No   Sig: every 24 hours   Diclofenac Sodium (VOLTAREN) 1 %   No No   Sig: Apply 2 g topically 3 (three) times a day as needed (Pain)   Magnesium 200 MG TABS  Self Yes No   Sig: Take 1 tablet by mouth daily   Multiple Vitamins-Minerals (MULTIVITAMIN ADULT PO)  Self Yes No   Sig: Take 2 tablets by mouth   XARELTO 20 MG tablet  Self Yes No   acetaminophen (TYLENOL) 500 mg tablet  Self Yes No   Sig: Take 500 mg by mouth every 6 (six) hours as needed   albuterol (Ventolin HFA) 90 mcg/act inhaler  Self No No   Sig: Inhale 2 puffs every 6 (six) hours as needed for wheezing   amLODIPine (NORVASC) 2 5 mg tablet  Self Yes No   Sig: Take 5 mg by mouth daily   diclofenac sodium (VOLTAREN) 1 %  Self Yes No   Sig: Apply 2 g topically 4 (four) times a day   dofetilide (TIKOSYN) 250 mcg capsule  Self Yes No   Sig: TAKE 1 CAPSULE BY MOUTH TWICE DAILY   etanercept (ENBREL SURECLICK) 50 MG/ML injection  Self Yes No   Sig: Inject 50 mg under the skin Once a week   fluticasone-salmeterol (Advair HFA) 115-21 MCG/ACT inhaler  Self No No   Sig: Inhale 2 puffs 2 (two) times a day Rinse mouth after use  furosemide (LASIX) 40 mg tablet  Self Yes No   Sig: Take 1 tablet by mouth see administration instructions 1 tablet on Mon, Wed, Fri 1/2 tablet on Tues, Th, Sat, Sun    metoprolol succinate (TOPROL-XL) 25 mg 24 hr tablet  Self Yes No   Sig: Take 25 mg by mouth 2 (two) times a day    montelukast (SINGULAIR) 10 mg tablet   No No   Sig: TAKE 1 TABLET BY MOUTH ONCE DAILY AT BEDTIME   rosuvastatin (CRESTOR) 20 MG tablet  Self Yes No   Sig: Take 20 mg by mouth      Facility-Administered Medications Last Administration Doses Remaining   levalbuterol (XOPENEX HFA) inhaler 1 puff None recorded           Past Medical History:   Diagnosis Date    Allergic     Anxiety     Atrial fibrillation (HCC)     Benign hypertensive heart and CKD, stage 3 (GFR 30-59), w CHF ([facility] [address] 2018    COVID-19     Depression     Heart attack ([facility] [address] [facility]     Hypertension     Psoriatic arthritis (HCC)     SOB (shortness of breath)     chronic       Past Surgical History:   Procedure Laterality Date    ABLATION COLPOCLESIS      ANGIOPLASTY Bilateral     stent placement behind knee    HYSTERECTOMY      KNEE SURGERY Left 1980    NECK SURGERY  2006    plate (C7)       Family History   Problem Relation Age of Onset    Osteoporosis Mother     Stroke Sister     Atrial fibrillation Sister     Cancer Brother      I have reviewed and agree with the history as documented      E-Cigarette/Vaping    E-Cigarette Use Never User      E-Cigarette/Vaping Substances    Nicotine No     THC No     CBD No     Flavoring No     Other No     Unknown No      Social History     Tobacco Use    Smoking status: Former Smoker     Packs/day: 0 75     Years: 39 00     Pack years: 29 25     Types: Cigarettes     Start date: 1977     Quit date: 2016     Years since quittin 1    Smokeless tobacco: Never Used    Tobacco comment: no passive smoke exposure   Vaping Use    Vaping Use: Never used   Substance Use Topics    Alcohol use: No    Drug use: No       Review of Systems   Constitutional: Negative for chills and fever  Musculoskeletal: Positive for arthralgias (rt wrist)  Skin: Negative for rash and wound  Neurological: Negative for weakness and numbness  Physical Exam  Physical Exam  Vitals and nursing note reviewed  Constitutional:       General: She is not in acute distress  Appearance: She is not ill-appearing, toxic-appearing or diaphoretic  Musculoskeletal:      Right elbow: Normal  No swelling, deformity, effusion or lacerations  Normal range of motion  No tenderness  Right forearm: Normal  No swelling, edema, deformity, lacerations, tenderness or bony tenderness  Right wrist: Swelling, tenderness and bony tenderness present  No deformity, effusion, lacerations, snuff box tenderness or crepitus  Decreased range of motion  Normal pulse  Right hand: Normal  No swelling, deformity, lacerations, tenderness or bony tenderness  Normal range of motion  Normal strength  Normal sensation  There is no disruption of two-point discrimination  Normal pulse  Arms:       Comments: Patient is able to oppose all fingers to the thumb  She has a 2+ radial pulse the FDP and FDS were isolated found be intact all the digits of the right hand patient is able to make an okay sign  She can extend the digits against resistance  Patient has evidence of a small fullness just distal to the ulnar stylette   Neurological:      General: No focal deficit present  Mental Status: She is alert and oriented to person, place, and time        Gait: Gait normal    Psychiatric:         Mood and Affect: Mood normal          Vital Signs  ED Triage Vitals [02/25/22 1918]   Temperature Pulse Respirations Blood Pressure SpO2   98 °F (36 7 °C) 87 18 140/74 98 %      Temp Source Heart Rate Source Patient Position - Orthostatic VS BP Location FiO2 (%)   Tympanic Monitor Sitting Left arm --      Pain Score       5           Vitals: 02/25/22 1918   BP: 140/74   Pulse: 87   Patient Position - Orthostatic VS: Sitting         Visual Acuity      ED Medications  Medications   acetaminophen (TYLENOL) tablet 650 mg (650 mg Oral Given 2/25/22 2021)       Diagnostic Studies  Results Reviewed     None                 XR wrist 3+ views RIGHT   Final Result by Vaishali Dillon MD (02/25 2012)      There is a cortical step-off at the proximal diaphysis of the 4th metacarpal seen only on one view, having the appearance of old healed minimally displaced fracture but incompletely evaluated  Recommend correlation for focal tenderness  The study was marked in EPIC for significant notification  Workstation performed: [id number]                    Procedures  Procedures         ED Course  ED Course as of 02/25/22 2253 Fri Feb 25, 2022 2040 Extensively reviewed radiographs with patient and her   I did palpate the the proximal/base of the 4th metacarpal as well as our entire 4th metacarpal she had no reproducible tenderness  He only has tenderness just around the area the ulnar stylette  No acute fractures  2041 Patient was fitted with a removable Velcro wrist splint this was fitted by the RN CMS intact after placement  MDM  Number of Diagnoses or Management Options  Wrist sprain, right, initial encounter  Diagnosis management comments: Mdm: sprain vs fx      Disposition  Final diagnoses:   Wrist sprain, right, initial encounter     Time reflects when diagnosis was documented in both MDM as applicable and the Disposition within this note     Time User Action Codes Description Comment    2/25/2022  8:41 PM Gaurav Fisher Add [[id number]] Wrist sprain, right, initial encounter       ED Disposition     ED Disposition Condition Date/Time Comment    Discharge Stable Fri Feb 25, 2022  8:41 PM Cal Jim discharge to home/self care              Follow-up Information     Follow up With Specialties Details Why Contact Info    [employer], DO Sports Medicine Call in 3 days recheck of wrist [address] [facility] [address]  [address]  [place] [address]  [phone]            Discharge Medication List as of 2/25/2022  8:45 PM      CONTINUE these medications which have NOT CHANGED    Details   acetaminophen (TYLENOL) 500 mg tablet Take 500 mg by mouth every 6 (six) hours as needed, Historical Med      albuterol (Ventolin HFA) 90 mcg/act inhaler Inhale 2 puffs every 6 (six) hours as needed for wheezing, Starting Mon 10/4/2021, Normal      amLODIPine (NORVASC) 2 5 mg tablet Take 5 mg by mouth daily, Historical Med      Aspirin (ASPIR-81 PO) every 24 hours, Historical Med      diclofenac sodium (VOLTAREN) 1 % Apply 2 g topically 4 (four) times a day, Historical Med      Diclofenac Sodium (VOLTAREN) 1 % Apply 2 g topically 3 (three) times a day as needed (Pain), Starting Wed 2/16/2022, Normal      dofetilide (TIKOSYN) 250 mcg capsule TAKE 1 CAPSULE BY MOUTH TWICE DAILY, Historical Med      etanercept (ENBREL SURECLICK) 50 MG/ML injection Inject 50 mg under the skin Once a week, Starting Tue 1/5/2021, Historical Med      fluticasone-salmeterol (Advair HFA) 115-21 MCG/ACT inhaler Inhale 2 puffs 2 (two) times a day Rinse mouth after use , Starting Tue 10/12/2021, Normal      furosemide (LASIX) 40 mg tablet Take 1 tablet by mouth see administration instructions 1 tablet on Mon, Wed, Fri 1/2 tablet on Tues, Thurs, Sat, Sun , Starting Tue 6/19/2018, Historical Med      Magnesium 200 MG TABS Take 1 tablet by mouth daily, Historical Med      metoprolol succinate (TOPROL-XL) 25 mg 24 hr tablet Take 25 mg by mouth 2 (two) times a day , Starting Thu 11/23/2017, Historical Med      montelukast (SINGULAIR) 10 mg tablet TAKE 1 TABLET BY MOUTH ONCE DAILY AT BEDTIME, Normal      Multiple Vitamins-Minerals (MULTIVITAMIN ADULT PO) Take 2 tablets by mouth, Historical Med      rosuvastatin (CRESTOR) 20 MG tablet Take 20 mg by mouth, Starting Mon 3/26/2018, Historical Med      XARELTO 20 MG tablet Starting Thu 7/5/2018, Historical Med             No discharge procedures on file      PDMP Review       Value Time User    PDMP Reviewed  Yes 8/12/2020  7:58 PM Jasen Julian DO          ED Provider  Electronically Signed by           Kristy Duarte MD  02/25/22 8267

## 2022-02-26 NOTE — DISCHARGE INSTRUCTIONS
Tylenol 650mg every 6 hours as needed for pain, fever (max 3000mg in 24 hours)   Wrist splint for comfort  Return with worsening or change in pain

## 2022-02-26 NOTE — ED NOTES
Work note provided with D/C summary  Ice pack provided with education, pt demonstrated understanding and in agreement with treatment  Splint to RUE applied via fellow CORBY Berkowitz RN  02/25/22 2052       Isela Berkowitz RN  02/25/22 2488

## 2022-03-01 ENCOUNTER — OFFICE VISIT (OUTPATIENT)
Dept: OBGYN CLINIC | Facility: CLINIC | Age: 57
End: 2022-03-01
Payer: COMMERCIAL

## 2022-03-01 VITALS
WEIGHT: 202 LBS | HEIGHT: 70 IN | HEART RATE: 88 BPM | SYSTOLIC BLOOD PRESSURE: 148 MMHG | BODY MASS INDEX: 28.92 KG/M2 | DIASTOLIC BLOOD PRESSURE: 91 MMHG

## 2022-03-01 DIAGNOSIS — S60.211A CONTUSION OF RIGHT WRIST, INITIAL ENCOUNTER: Primary | ICD-10-CM

## 2022-03-01 PROCEDURE — 99213 OFFICE O/P EST LOW 20 MIN: CPT | Performed by: ORTHOPAEDIC SURGERY

## 2022-03-01 NOTE — PROGRESS NOTES
Chief Complaint  Right wrist pain and swelling    History Of Presenting Illness  Salvatore Burch 1965 presents with right wrist pain and swelling 3 days  Patient tripped and fell at home  Patient landed awkwardly injuring her right wrist against the toilet  Injury happened last Friday  Patient was seen in urgent care center where she had radiographs splinted and sent to the office  Patient's main complaint is pain and swelling over the ulnar styloid  Aggravated by movements  Patient is left handed  Patient works as a   Current Medications  Current Outpatient Medications   Medication Sig Dispense Refill    acetaminophen (TYLENOL) 500 mg tablet Take 500 mg by mouth every 6 (six) hours as needed      albuterol (Ventolin HFA) 90 mcg/act inhaler Inhale 2 puffs every 6 (six) hours as needed for wheezing 18 g 3    amLODIPine (NORVASC) 2 5 mg tablet Take 5 mg by mouth daily      Aspirin (ASPIR-81 PO) every 24 hours      diclofenac sodium (VOLTAREN) 1 % Apply 2 g topically 4 (four) times a day      Diclofenac Sodium (VOLTAREN) 1 % Apply 2 g topically 3 (three) times a day as needed (Pain) 150 g 3    dofetilide (TIKOSYN) 250 mcg capsule TAKE 1 CAPSULE BY MOUTH TWICE DAILY      etanercept (ENBREL SURECLICK) 50 MG/ML injection Inject 50 mg under the skin Once a week      fluticasone-salmeterol (Advair HFA) 115-21 MCG/ACT inhaler Inhale 2 puffs 2 (two) times a day Rinse mouth after use   12 g 5    furosemide (LASIX) 40 mg tablet Take 1 tablet by mouth see administration instructions 1 tablet on Mon, Wed, Fri 1/2 tablet on Tues, Thurs, Sat, Sun       Magnesium 200 MG TABS Take 1 tablet by mouth daily      metoprolol succinate (TOPROL-XL) 25 mg 24 hr tablet Take 25 mg by mouth 2 (two) times a day       montelukast (SINGULAIR) 10 mg tablet TAKE 1 TABLET BY MOUTH ONCE DAILY AT BEDTIME 30 tablet 0    Multiple Vitamins-Minerals (MULTIVITAMIN ADULT PO) Take 2 tablets by mouth      rosuvastatin (CRESTOR) 20 MG tablet Take 20 mg by mouth      XARELTO 20 MG tablet        Current Facility-Administered Medications   Medication Dose Route Frequency Provider Last Rate Last Admin    levalbuterol (XOPENEX HFA) inhaler 1 puff  1 puff Inhalation Q6H PRN Curt Negrete MD           Current Problems    Active Problems:   Patient Active Problem List    Diagnosis Date Noted    COVID-19 04/16/2021    Mild intermittent asthma with acute exacerbation 01/15/2021    Cigarette nicotine dependence in remission 01/15/2021    Class 1 obesity due to excess calories with serious comorbidity and body mass index (BMI) of 30 0 to 30 9 in adult 01/15/2021    S/P ablation of atrial fibrillation 11/10/2018    Current use of long term anticoagulation 11/10/2018    Generalized anxiety disorder with panic attacks 11/10/2018    [facility] 09/28/2018    Pulmonary emphysema ([facility] ) 09/05/2018    Pure hypercholesterolemia 05/25/2018    Benign hypertension 05/25/2018    CAD (coronary artery disease) 01/30/2018    Chronic systolic CHF (congestive heart failure) ([facility] [address] ) 01/30/2018    Cardiomyopathy ([facility] ) 10/09/2017    Peripheral vascular disease ([facility] [address] ) 10/03/2017    Psoriatic arthritis ([facility] ) 03/15/2016    Allergy 03/15/2016    Attention deficit disorder (ADD) without hyperactivity 03/15/2016         Review of Systems:    General: negative for - chills, fatigue, fever,  weight gain or weight loss  Psychological: negative for - anxiety, behavioral disorder, concentration difficulties  Ophthalmic: negative for - blurry vision, decreased vision, double vision,      Past Medical History:   Past Medical History:   Diagnosis Date    Allergic     Anxiety     Atrial fibrillation ([facility] [address] )     Benign hypertensive heart and CKD, stage 3 (GFR 30-59), w CHF ([address] ) 9/5/2018    COVID-19     Depression     Heart attack ([facility] [address] )     Hypertension     Psoriatic arthritis (HCC)     SOB (shortness of breath)     chronic Past Surgical History:   Past Surgical History:   Procedure Laterality Date    ABLATION COLPOCLESIS      ANGIOPLASTY Bilateral     stent placement behind knee    HYSTERECTOMY      KNEE SURGERY Left 1980    NECK SURGERY  2006    plate (C7)       Family History:  Family history reviewed and non-contributory  Family History   Problem Relation Age of Onset    Osteoporosis Mother     Stroke Sister     Atrial fibrillation Sister     Cancer Brother        Social History:  Social History     Socioeconomic History    Marital status: Single     Spouse name: None    Number of children: None    Years of education: None    Highest education level: None   Occupational History    Occupation: disability   Tobacco Use    Smoking status: Former Smoker     Packs/day: 0 75     Years: 39 00     Pack years: 29 25     Types: Cigarettes     Start date: 1977     Quit date:      Years since quittin 1    Smokeless tobacco: Never Used    Tobacco comment: no passive smoke exposure   Vaping Use    Vaping Use: Never used   Substance and Sexual Activity    Alcohol use: No    Drug use: No    Sexual activity: Yes     Partners: Male   Other Topics Concern    None   Social History Narrative    None     Social Determinants of Health     Financial Resource Strain: Not on file   Food Insecurity: Not on file   Transportation Needs: Not on file   Physical Activity: Not on file   Stress: Not on file   Social Connections: Not on file   Intimate Partner Violence: Not on file   Housing Stability: Not on file       Allergies:    Allergies   Allergen Reactions    Albuterol Tachycardia     Afib  Afib  Afib    Alprazolam Hives    Dust Mite Extract Shortness Of Breath    Umeclidinium-Vilanterol Tachycardia     Afib  Afib  Afib    Calcipotriene Rash           Physical ExaminationBP 148/91   Pulse 88   Ht 5' 10" (1 778 m)   Wt 91 6 kg (202 lb)   BMI 28 98 kg/m²   Gen: Alert and oriented to person, place, time  HEENT: EOMI, eyes clear, moist mucus membranes, hearing intact      Orthopedic Exam  Right wrist ecchymosis and swelling present of the ulnar styloid and dorsal ulnar aspect of the wrist extremely tender the ulnar styloid no distal deficits no tenderness  Of the metacarpals no distal neurovascular deficits All compartments soft nontender  Radiographs no definite fracture or dislocation seen          Impression  Contusion right wrist possible nondisplaced fracture of the ulnar styloid            Plan    Right wrist cast, ice, over-the-counter  Pain medication and elevation follow-up in 2 weeks cast-off x-rays right wrist on arrival    Jamey Escalona MD        Portions of the record may have been created with voice recognition software  Occasional wrong word or "sound a like" substitutions may have occurred due to the inherent limitations of voice recognition software  Read the chart carefully and recognize, using context, where substitutions have occurred

## 2022-03-01 NOTE — LETTER
March 1, 2022     Patient: Robin Harris   YOB: 1965   Date of Visit: 3/1/2022       To Whom It May Concern: It is my medical opinion that Adan Savage may return to work on 3/21/2022  If you have any questions or concerns, please don't hesitate to call  Sincerely,        Seamus Muñoz MD    CC: Lenny Calles

## 2022-03-03 ENCOUNTER — TELEPHONE (OUTPATIENT)
Dept: OBGYN CLINIC | Facility: MEDICAL CENTER | Age: 57
End: 2022-03-03

## 2022-03-03 NOTE — TELEPHONE ENCOUNTER
Patient sees Dr Em Das  Patient inquiring about disability paperwork if it was received, confirmed it is scanned into our system

## 2022-03-07 ENCOUNTER — TELEPHONE (OUTPATIENT)
Dept: OBGYN CLINIC | Facility: HOSPITAL | Age: 57
End: 2022-03-07

## 2022-03-07 NOTE — TELEPHONE ENCOUNTER
[employer] [facility] called in today to confirm we received patient's disability forms  Confirmed we received them and advised of turnaround time 10-14 business days  Confirmed appointment date and return to work date as well as ICD-10 code used for the patient's visit

## 2022-03-09 ENCOUNTER — TELEPHONE (OUTPATIENT)
Dept: OBGYN CLINIC | Facility: HOSPITAL | Age: 57
End: 2022-03-09

## 2022-03-09 NOTE — TELEPHONE ENCOUNTER
[employer] [facility] [facility] is calling in stating that they were on the line with another representative and the call disconnected  They are asking if the first 3 pages of the attending physician statement could be refaxed to them  It was faxed over, but is slightly blurry for them       Aleida Bruner # [phone]  Fax # [phone]

## 2022-03-09 NOTE — TELEPHONE ENCOUNTER
Osiel Das is calling from [employer] [facility] to ask that we please fax the two pages titled Attending Physician's Statement to them at [phone]  They did receive a 15 page fax from our office, but those pages are illegible

## 2022-03-11 NOTE — TELEPHONE ENCOUNTER
Remy @ [facility] called to see if we can refax the forms  He said alysa should be about four more pages , he only got the first page of the "Certification of Healthcare Provider Form"  Can we please refax ?     Fax # [phone]    C/b # [phone]    Thank you

## 2022-03-15 ENCOUNTER — OFFICE VISIT (OUTPATIENT)
Dept: OBGYN CLINIC | Facility: CLINIC | Age: 57
End: 2022-03-15
Payer: COMMERCIAL

## 2022-03-15 ENCOUNTER — APPOINTMENT (OUTPATIENT)
Dept: RADIOLOGY | Facility: MEDICAL CENTER | Age: 57
End: 2022-03-15
Payer: COMMERCIAL

## 2022-03-15 DIAGNOSIS — L40.50 PSORIATIC ARTHRITIS (HCC): ICD-10-CM

## 2022-03-15 DIAGNOSIS — S60.211D CONTUSION OF RIGHT WRIST, SUBSEQUENT ENCOUNTER: Primary | ICD-10-CM

## 2022-03-15 DIAGNOSIS — S60.211A CONTUSION OF RIGHT WRIST, INITIAL ENCOUNTER: ICD-10-CM

## 2022-03-15 PROCEDURE — 73110 X-RAY EXAM OF WRIST: CPT

## 2022-03-15 PROCEDURE — 99213 OFFICE O/P EST LOW 20 MIN: CPT | Performed by: ORTHOPAEDIC SURGERY

## 2022-03-15 NOTE — LETTER
March 15, 2022     Patient: Salvatore Burch   YOB: 1965   Date of Visit: 3/15/2022       To Whom It May Concern: It is my medical opinion that Megan Whitten may return to work on 3/21/22  If you have any questions or concerns, please don't hesitate to call           Sincerely,        Elli Garcia MD    CC: No Recipients

## 2022-03-15 NOTE — PATIENT INSTRUCTIONS
Hand Exercises   What you need to know about carpal tunnel hand exercises:  Hand and finger exercises can help relieve pain and increase your range of motion  Your healthcare provider or physical therapist can tell you how often to do the exercises  Hand exercises:   · Finger extensions:  Hold your fingers and thumb close together  Keep them straight  Put a rubber band around the outside of your fingers and thumb  Spread your fingers apart  Then slowly bring them together without letting the rubber band fall off  Repeat 40 times  · Wrist flexor stretch:  Hold your arm out straight  Grasp your fingers with your other hand  Slowly bend the fingers back (palm facing away) until you feel a stretch in your wrist  Hold for 10 seconds  Repeat 5 times  · Wrist extensor stretch:  Hold your arm out straight  Grasp your fingers with your other hand  Slowly bend the fingers and hand down (palm facing you) until you feel a stretch on top of your hand  Hold for 10 seconds  Repeat 5 times  · Wrist curls without weights:  Sit in a chair with your forearm resting on your thigh or a table  With your palm facing down, flex your wrist up 3 inches and slowly lower it  Turn your forearm over and repeat with your palm facing up  Do each exercise 20 times  · Shrugs:  Stand with your arms by your side  Lift your shoulders up to your ears and hold for 1 second  Then pull your shoulders back, pinching your shoulder blades together  Hold for 1 second  Then relax your shoulders  Repeat 20 times  A     © 2017 [employer] Information is for End User's use only and may not be sold, redistributed or otherwise used for commercial purposes  All illustrations and images included in CareNotes® are the copyrighted property of A D A M , Inc  or Braden Alvarado  The above information is an  only  It is not intended as medical advice for individual conditions or treatments  Talk to your doctor, nurse or pharmacist before following any medical regimen to see if it is safe and effective for you

## 2022-03-15 NOTE — PROGRESS NOTES
Chief Complaint  Right hand pain    History Of Presenting Illness  Afia Jimenez 1965 presents with right hand pain status post injury in February 2022  Treated in a cast due to the extent of pain  Examined out of cast today  Patient complains some discomfort in the dorsum of the wrist   Patient presents for evaluation of x-rays      Current Medications  Current Outpatient Medications   Medication Sig Dispense Refill    acetaminophen (TYLENOL) 500 mg tablet Take 500 mg by mouth every 6 (six) hours as needed      albuterol (Ventolin HFA) 90 mcg/act inhaler Inhale 2 puffs every 6 (six) hours as needed for wheezing 18 g 3    amLODIPine (NORVASC) 2 5 mg tablet Take 5 mg by mouth daily      Aspirin (ASPIR-81 PO) every 24 hours      diclofenac sodium (VOLTAREN) 1 % Apply 2 g topically 4 (four) times a day      Diclofenac Sodium (VOLTAREN) 1 % Apply 2 g topically 3 (three) times a day as needed (Pain) 150 g 3    dofetilide (TIKOSYN) 250 mcg capsule TAKE 1 CAPSULE BY MOUTH TWICE DAILY      etanercept (ENBREL SURECLICK) 50 MG/ML injection Inject 50 mg under the skin Once a week      fluticasone-salmeterol (Advair HFA) 115-21 MCG/ACT inhaler Inhale 2 puffs 2 (two) times a day Rinse mouth after use   12 g 5    furosemide (LASIX) 40 mg tablet Take 1 tablet by mouth see administration instructions 1 tablet on Mon, Wed, Fri 1/2 tablet on Tues, Thurs, Sat, Sun       Magnesium 200 MG TABS Take 1 tablet by mouth daily      metoprolol succinate (TOPROL-XL) 25 mg 24 hr tablet Take 25 mg by mouth 2 (two) times a day       montelukast (SINGULAIR) 10 mg tablet TAKE 1 TABLET BY MOUTH ONCE DAILY AT BEDTIME 30 tablet 0    Multiple Vitamins-Minerals (MULTIVITAMIN ADULT PO) Take 2 tablets by mouth      rosuvastatin (CRESTOR) 20 MG tablet Take 20 mg by mouth      XARELTO 20 MG tablet        Current Facility-Administered Medications   Medication Dose Route Frequency Provider Last Rate Last Admin    levalbuterol [facility] HFA) inhaler 1 puff  1 puff Inhalation Q6H PRN Yanira Astudillo MD           Current Problems    Active Problems:   Patient Active Problem List    Diagnosis Date Noted    COVID-19 04/16/2021    Mild intermittent asthma with acute exacerbation 01/15/2021    Cigarette nicotine dependence in remission 01/15/2021    Class 1 obesity due to excess calories with serious comorbidity and body mass index (BMI) of 30 0 to 30 9 in adult 01/15/2021    S/P ablation of atrial fibrillation 11/10/2018    Current use of long term anticoagulation 11/10/2018    Generalized anxiety disorder with panic attacks 11/10/2018    Persistent atrial fibrillation ([facility] [address] ) 09/28/2018    Pulmonary emphysema ([facility] [address] ) 09/05/2018    Pure hypercholesterolemia 05/25/2018    Benign hypertension 05/25/2018    CAD (coronary artery disease) 01/30/2018    Chronic systolic CHF (congestive heart failure) ([facility] [address] ) 01/30/2018    Cardiomyopathy ([facility] [address] ) 10/09/2017    Peripheral vascular disease ([facility] [address] ) 10/03/2017    Psoriatic arthritis ([facility] [address] ) 03/15/2016    Allergy 03/15/2016    Attention deficit disorder (ADD) without hyperactivity 03/15/2016         Review of Systems:    General: negative for - chills, fatigue, fever,  weight gain or weight loss  Psychological: negative for - anxiety, behavioral disorder, concentration difficulties  Ophthalmic: negative for - blurry vision, decreased vision, double vision,      Past Medical History:   Past Medical History:   Diagnosis Date    Allergic     Anxiety     Atrial fibrillation ([facility] [address] )     Benign hypertensive heart and CKD, stage 3 (GFR 30-59), w CHF ([address] ) 9/5/2018    COVID-19     Depression     Heart attack ([facility] [address] )     Hypertension     Psoriatic arthritis (HCC)     SOB (shortness of breath)     chronic       Past Surgical History:   Past Surgical History:   Procedure Laterality Date    ABLATION COLPOCLESIS      ANGIOPLASTY Bilateral     stent placement behind knee    HYSTERECTOMY      KNEE SURGERY Left [address] [facility] SURGERY  2006    plate (C7)       Family History:  Family history reviewed and non-contributory  Family History   Problem Relation Age of Onset    Osteoporosis Mother     Stroke Sister     Atrial fibrillation Sister     Cancer Brother        Social History:  Social History     Socioeconomic History    Marital status: Single     Spouse name: Not on file    Number of children: Not on file    Years of education: Not on file    Highest education level: Not on file   Occupational History    Occupation: disability   Tobacco Use    Smoking status: Former Smoker     Packs/day: 0 75     Years: 39 00     Pack years: 29 25     Types: Cigarettes     Start date: 1977     Quit date:      Years since quittin 2    Smokeless tobacco: Never Used    Tobacco comment: no passive smoke exposure   Vaping Use    Vaping Use: Never used   Substance and Sexual Activity    Alcohol use: No    Drug use: No    Sexual activity: Yes     Partners: Male   Other Topics Concern    Not on file   Social History Narrative    Not on file     Social Determinants of Health     Financial Resource Strain: Not on file   Food Insecurity: Not on file   Transportation Needs: Not on file   Physical Activity: Not on file   Stress: Not on file   Social Connections: Not on file   Intimate Partner Violence: Not on file   Housing Stability: Not on file       Allergies: Allergies   Allergen Reactions    Albuterol Tachycardia     Afib  Afib  Afib    Alprazolam Hives    Dust Mite Extract Shortness Of Breath    Umeclidinium-Vilanterol Tachycardia     Afib  Afib  Afib    Calcipotriene Rash           Physical ExaminationThere were no vitals taken for this visit    Gen: Alert and oriented to person, place, time  HEENT: EOMI, eyes clear, moist mucus membranes, hearing intact      Orthopedic Exam  Right wrist no swelling or deformity ecchymoses present over the dorsal wrist capsule mild tenderness present  Radiographs do not show any definite fracture          Impression  Right wrist contusion possible bony avulsion not seen on x-rays resolving           Plan    Lace-up wrist brace for comfort ice, over-the-counter pain medication, home exercise program and PT OT  Follow-up in 6 weeks if necessary will only obtain radiographs of symptomatic    Christopher Cook MD        Portions of the record may have been created with voice recognition software  Occasional wrong word or "sound a like" substitutions may have occurred due to the inherent limitations of voice recognition software  Read the chart carefully and recognize, using context, where substitutions have occurred

## 2022-03-16 ENCOUNTER — EVALUATION (OUTPATIENT)
Dept: PHYSICAL THERAPY | Facility: CLINIC | Age: 57
End: 2022-03-16
Payer: COMMERCIAL

## 2022-03-16 DIAGNOSIS — S60.211D CONTUSION OF RIGHT WRIST, SUBSEQUENT ENCOUNTER: ICD-10-CM

## 2022-03-16 PROCEDURE — 97140 MANUAL THERAPY 1/> REGIONS: CPT | Performed by: PHYSICAL THERAPIST

## 2022-03-16 PROCEDURE — 97110 THERAPEUTIC EXERCISES: CPT | Performed by: PHYSICAL THERAPIST

## 2022-03-16 PROCEDURE — 97112 NEUROMUSCULAR REEDUCATION: CPT | Performed by: PHYSICAL THERAPIST

## 2022-03-16 PROCEDURE — 97035 APP MDLTY 1+ULTRASOUND EA 15: CPT | Performed by: PHYSICAL THERAPIST

## 2022-03-16 PROCEDURE — 97161 PT EVAL LOW COMPLEX 20 MIN: CPT | Performed by: PHYSICAL THERAPIST

## 2022-03-16 NOTE — PROGRESS NOTES
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; Involved- right  Pt will be unable to cont therapy due to her work schedule  She was instructed in AROM and HEP for pain control    Goals  ST  Decrease pain to 0-2/10 in 4 weeks            2  Decrease swelling right wrist and hand            3  Increase AROM to [facility] in 6-8 weeks            4  Provide orthotic for protection, compression for support  LT  Increase functional motion and strength for independence with ADL and self care by DC            2  Ability to RTW and recreational activity by DC  Goals unable to be achieved    Plan  Patient would benefit from: skilled physical therapy  Planned modality interventions: cryotherapy, thermotherapy: hydrocollator packs and ultrasound  Planned therapy interventions: activity modification, manual therapy, neuromuscular re-education, strengthening, stretching, therapeutic activities, therapeutic exercise and home exercise program  Frequency: 2x week  Duration in weeks: 4  Treatment plan discussed with: patient        Subjective Evaluation    History of Present Illness  Date of onset: 2022  Mechanism of injury: Pt fell at home, injuring her right wrist   Pt was seen in ED, then by Dr Dione Howard  She was casted for 1 week due to pain  Cast was removed and she was placed in a SA orthosis      Pain  Current pain rating: 3  At best pain rating: 3  At worst pain ratin  Location: ulna wrist and FA  Quality: radiating and throbbing    Hand dominance: right    Treatments  Current treatment: physical therapy  Patient Goals  Patient goals for therapy: decreased edema, increased motion, decreased pain, increased strength, independence with ADLs/IADLs, return to sport/leisure activities and return to work          Objective     General Comments:      Wrist/Hand Comments  AROM right FA S/P- 75/90; wrist E/F- 60/55                      Digits- WFL  Strength-  II- 40/60; 3 SHONNA- 4/8; Key-   Palpation- tender TFCC area and dorsal styloid area  Circumference at wrist R/L- 15 6/15 2 cm  Pt unable to attend therapy due to RTW    She ws instructed in a HEP             Precautions: avoid provocative activity and positions      Manuals 3/16            STM 15                                                   Neuro Re-Ed             HP/pulsed biph 15                                                                                          Ther Ex             AROM FA/Wrist 10x                                                                                                                                                                                     Modalities             US 12            CP 15

## 2022-03-27 DIAGNOSIS — J45.20 INTERMITTENT ASTHMA, UNSPECIFIED ASTHMA SEVERITY, UNSPECIFIED WHETHER COMPLICATED: ICD-10-CM

## 2022-03-28 RX ORDER — MONTELUKAST SODIUM 10 MG/1
TABLET ORAL
Qty: 30 TABLET | Refills: 0 | Status: SHIPPED | OUTPATIENT
Start: 2022-03-28 | End: 2022-04-28

## 2022-03-29 ENCOUNTER — APPOINTMENT (EMERGENCY)
Dept: RADIOLOGY | Facility: HOSPITAL | Age: 57
End: 2022-03-29
Payer: COMMERCIAL

## 2022-03-29 ENCOUNTER — HOSPITAL ENCOUNTER (EMERGENCY)
Facility: HOSPITAL | Age: 57
Discharge: HOME | End: 2022-03-29
Attending: EMERGENCY MEDICINE | Admitting: EMERGENCY MEDICINE
Payer: COMMERCIAL

## 2022-03-29 ENCOUNTER — OFFICE VISIT (OUTPATIENT)
Dept: URGENT CARE | Facility: MEDICAL CENTER | Age: 57
End: 2022-03-29
Payer: COMMERCIAL

## 2022-03-29 VITALS
SYSTOLIC BLOOD PRESSURE: 124 MMHG | BODY MASS INDEX: 29.63 KG/M2 | HEIGHT: 70 IN | DIASTOLIC BLOOD PRESSURE: 68 MMHG | WEIGHT: 207 LBS | TEMPERATURE: 97 F | HEART RATE: 83 BPM | OXYGEN SATURATION: 99 % | RESPIRATION RATE: 18 BRPM

## 2022-03-29 VITALS
RESPIRATION RATE: 18 BRPM | WEIGHT: 210 LBS | BODY MASS INDEX: 30.06 KG/M2 | HEIGHT: 70 IN | OXYGEN SATURATION: 99 % | TEMPERATURE: 97.7 F | DIASTOLIC BLOOD PRESSURE: 55 MMHG | HEART RATE: 65 BPM | SYSTOLIC BLOOD PRESSURE: 121 MMHG

## 2022-03-29 DIAGNOSIS — R06.00 DYSPNEA: Primary | ICD-10-CM

## 2022-03-29 DIAGNOSIS — R06.02 SOB (SHORTNESS OF BREATH): Primary | ICD-10-CM

## 2022-03-29 LAB
ANION GAP SERPL CALCULATED.3IONS-SCNC: 10 MMOL/L (ref 4–13)
APTT PPP: 34 SECONDS (ref 23–37)
BASOPHILS # BLD AUTO: 0.05 THOUSANDS/ΜL (ref 0–0.1)
BASOPHILS NFR BLD AUTO: 1 % (ref 0–1)
BUN SERPL-MCNC: 22 MG/DL (ref 5–25)
CALCIUM SERPL-MCNC: 9.2 MG/DL (ref 8.3–10.1)
CARDIAC TROPONIN I PNL SERPL HS: 10 NG/L
CHLORIDE SERPL-SCNC: 100 MMOL/L (ref 100–108)
CO2 SERPL-SCNC: 28 MMOL/L (ref 21–32)
CREAT SERPL-MCNC: 1.42 MG/DL (ref 0.6–1.3)
D DIMER PPP FEU-MCNC: <0.27 UG/ML FEU
EOSINOPHIL # BLD AUTO: 0.22 THOUSAND/ΜL (ref 0–0.61)
EOSINOPHIL NFR BLD AUTO: 3 % (ref 0–6)
ERYTHROCYTE [DISTWIDTH] IN BLOOD BY AUTOMATED COUNT: 12.4 % (ref 11.6–15.1)
GFR SERPL CREATININE-BSD FRML MDRD: 40 ML/MIN/1.73SQ M
GLUCOSE SERPL-MCNC: 104 MG/DL (ref 65–140)
HCT VFR BLD AUTO: 44.8 % (ref 34.8–46.1)
HGB BLD-MCNC: 15.3 G/DL (ref 11.5–15.4)
IMM GRANULOCYTES # BLD AUTO: 0.02 THOUSAND/UL (ref 0–0.2)
IMM GRANULOCYTES NFR BLD AUTO: 0 % (ref 0–2)
INR PPP: 1.28 (ref 0.84–1.19)
LACTATE SERPL-SCNC: 1.2 MMOL/L (ref 0.5–2)
LYMPHOCYTES # BLD AUTO: 2.02 THOUSANDS/ΜL (ref 0.6–4.47)
LYMPHOCYTES NFR BLD AUTO: 28 % (ref 14–44)
MCH RBC QN AUTO: 31.7 PG (ref 26.8–34.3)
MCHC RBC AUTO-ENTMCNC: 34.2 G/DL (ref 31.4–37.4)
MCV RBC AUTO: 93 FL (ref 82–98)
MONOCYTES # BLD AUTO: 0.99 THOUSAND/ΜL (ref 0.17–1.22)
MONOCYTES NFR BLD AUTO: 14 % (ref 4–12)
NEUTROPHILS # BLD AUTO: 4.05 THOUSANDS/ΜL (ref 1.85–7.62)
NEUTS SEG NFR BLD AUTO: 54 % (ref 43–75)
NRBC BLD AUTO-RTO: 0 /100 WBCS
NT-PROBNP SERPL-MCNC: 618 PG/ML
PLATELET # BLD AUTO: 196 THOUSANDS/UL (ref 149–390)
PMV BLD AUTO: 9.8 FL (ref 8.9–12.7)
POTASSIUM SERPL-SCNC: 3.8 MMOL/L (ref 3.5–5.3)
PROCALCITONIN SERPL-MCNC: 0.09 NG/ML
PROTHROMBIN TIME: 15.3 SECONDS (ref 11.6–14.5)
RBC # BLD AUTO: 4.83 MILLION/UL (ref 3.81–5.12)
SODIUM SERPL-SCNC: 138 MMOL/L (ref 136–145)
WBC # BLD AUTO: 7.35 THOUSAND/UL (ref 4.31–10.16)

## 2022-03-29 PROCEDURE — 83880 ASSAY OF NATRIURETIC PEPTIDE: CPT | Performed by: EMERGENCY MEDICINE

## 2022-03-29 PROCEDURE — 83605 ASSAY OF LACTIC ACID: CPT | Performed by: EMERGENCY MEDICINE

## 2022-03-29 PROCEDURE — 36415 COLL VENOUS BLD VENIPUNCTURE: CPT | Performed by: EMERGENCY MEDICINE

## 2022-03-29 PROCEDURE — 99285 EMERGENCY DEPT VISIT HI MDM: CPT | Performed by: EMERGENCY MEDICINE

## 2022-03-29 PROCEDURE — 85379 FIBRIN DEGRADATION QUANT: CPT | Performed by: EMERGENCY MEDICINE

## 2022-03-29 PROCEDURE — 85610 PROTHROMBIN TIME: CPT | Performed by: EMERGENCY MEDICINE

## 2022-03-29 PROCEDURE — 84484 ASSAY OF TROPONIN QUANT: CPT | Performed by: EMERGENCY MEDICINE

## 2022-03-29 PROCEDURE — 99212 OFFICE O/P EST SF 10 MIN: CPT | Performed by: PHYSICIAN ASSISTANT

## 2022-03-29 PROCEDURE — 99285 EMERGENCY DEPT VISIT HI MDM: CPT

## 2022-03-29 PROCEDURE — 71045 X-RAY EXAM CHEST 1 VIEW: CPT

## 2022-03-29 PROCEDURE — 80048 BASIC METABOLIC PNL TOTAL CA: CPT | Performed by: EMERGENCY MEDICINE

## 2022-03-29 PROCEDURE — 93005 ELECTROCARDIOGRAM TRACING: CPT

## 2022-03-29 PROCEDURE — 85025 COMPLETE CBC W/AUTO DIFF WBC: CPT | Performed by: EMERGENCY MEDICINE

## 2022-03-29 PROCEDURE — 84145 PROCALCITONIN (PCT): CPT | Performed by: EMERGENCY MEDICINE

## 2022-03-29 PROCEDURE — 85730 THROMBOPLASTIN TIME PARTIAL: CPT | Performed by: EMERGENCY MEDICINE

## 2022-03-29 RX ORDER — PREDNISONE 20 MG/1
50 TABLET ORAL DAILY
Qty: 10 TABLET | Refills: 0 | Status: SHIPPED | OUTPATIENT
Start: 2022-03-29 | End: 2022-04-02

## 2022-03-29 RX ADMIN — PREDNISONE 50 MG: 20 TABLET ORAL at 15:20

## 2022-03-29 NOTE — ED PROVIDER NOTES
Final Diagnosis:  1  Dyspnea        Chief Complaint   Patient presents with    Shortness of Breath     hx of asthma, was told to come to for eval    Abnormal ECG     change from previous to today's at urgent care     HPI  Patient presents for evaluation shortness of breath  Patient was [facility] and I received a call for reported they were having her transferred to our facility  They stated that for the past 2 days the patient has been more short of breath than usual   Specifically this was worse with ambulation  With attempting to use her home inhaler without any improvement  Denies any chest pain throughout the experience  She does have a history of CAD, AFib, per so vascular disease  Possibly history of stents in her legs but it was unclear  Patient is on Xarelto  Follows at another facility for her cardiac history but would like to be evaluated here  Patient was ambulated at outside facility without desaturation  On arrival the patient is able to ambulate nondistressed  She endorses the above story  She states that the shortness of breath came on about 2 days ago  This is worse with ambulation and better at rest   She states that she was at work yesterday and then decided that she needs, home at approximately 3:00 a m  Because she was having difficulty breathing  Patient states that she also had an episode yesterday when she had some numbness going down her right arm  Denies any chest pain, nausea or vomiting  Unless otherwise specified:  - No language barrier    - History obtained from patient  - There are no limitations to the history obtained    - Previous charting was reviewed    PMH:   has a past medical history of Allergic, Anxiety, Atrial fibrillation ([address] ), Benign hypertensive heart and CKD, stage 3 (GFR 30-59), w CHF ([address] ) (9/5/2018), COVID-19, Depression, Heart attack ([address] ), Hypertension, Psoriatic arthritis ([address] ), and SOB (shortness of breath)  PSH:   has a past surgical history that includes Angioplasty (Bilateral); Neck surgery (2006); Knee surgery (Left, 1980); Ablation colpoclesis; and Hysterectomy  ROS:  Review of Systems   -   - 13 point ROS was performed and all are normal unless stated in the history above  - Nursing note reviewed  Vitals reviewed  - Orders placed by myself and/or advanced practitioner / resident  PE:   Vitals:    03/29/22 1350 03/29/22 1400 03/29/22 1430 03/29/22 1500   BP: 129/84 119/56 116/71 121/55   BP Location: Right arm Right arm     Pulse: 86 69 71 65   Resp: 18 19 20 18   Temp: 97 7 °F (36 5 °C)      TempSrc: Tympanic      SpO2: 98% 99% 98% 99%   Weight: 95 3 kg (210 lb)      Height: 5' 10" (1 778 m)        Vitals reviewed by me  Patient appears comfortable sitting in bed  No wheezing on lung exam   Saturating well on room air  No conversational dyspnea  Unless otherwise specified above:    General: VS reviewed  Appears in NAD    Head: Normocephalic, atraumatic  Eyes: EOM-I  No exudate  ENT: Atraumatic external nose and ears  No malocclusion  No stridor  No drooling  Neck: No JVD  CV: No pallor noted  Lungs:   No tachypnea  No respiratory distress    Abdomen:  Soft, non-tender, non-distended    MSK:   FROM spontaneously  No obvious deformity    Skin: Dry, intact  No obvious rash  Neuro: Awake, alert, GCS15, CN II-XII grossly intact  Speaking in full sentences  Motor grossly intact  Psychiatric/Behavioral: Appropriate mood and affect   Exam: deferred    Physical Exam     Procedures   A:  - Nursing note reviewed                     ED Course as of 03/29/22 1541   Tue Mar 29, 2022   1357 Procedure Note: EKG  Date/Time: 03/29/22 1:58 PM   Interpreted by: Mel Young  Indications / Diagnosis: sob  ECG reviewed by me, the ED Provider: yes   The EKG demonstrates:  Rhythm: normal sinus  Intervals: normal intervals  Axis: normal axis  QRS/Blocks: normal QRS  ST Changes: No acute ST Changes, no STD/HASEEB  No diffuse elevations to indicate pericarditis  No coved ST elevations greater than 2mm with negative T waves in V1-3 to indicate concern for brugada  No biphasic T waves in V2, V3 to indicate Wellens (critical stenosis of LAD)  No elevation in aVR or deviation when compared to V1 (can be associated with ST depression in I,II, V4-6 when left main occlusion is present)           XR chest 1 view portable    (Results Pending)     Orders Placed This Encounter   Procedures    XR chest 1 view portable    CBC and differential    Protime-INR    APTT    Basic metabolic panel    D-Dimer    HS Troponin 0hr (reflex protocol)    NT-BNP PRO    Lactic acid    Procalcitonin    Insert peripheral IV    ECG 12 lead     Labs Reviewed   CBC AND DIFFERENTIAL - Abnormal       Result Value Ref Range Status    WBC 7 35  4 31 - 10 16 Thousand/uL Final    RBC 4 83  3 81 - 5 12 Million/uL Final    Hemoglobin 15 3  11 5 - 15 4 g/dL Final    Hematocrit 44 8  34 8 - 46 1 % Final    MCV 93  82 - 98 fL Final    MCH 31 7  26 8 - 34 3 pg Final    MCHC 34 2  31 4 - 37 4 g/dL Final    RDW 12 4  11 6 - 15 1 % Final    MPV 9 8  8 9 - 12 7 fL Final    Platelets 814  881 - 390 Thousands/uL Final    nRBC 0  /100 WBCs Final    Neutrophils Relative 54  43 - 75 % Final    Immat GRANS % 0  0 - 2 % Final    Lymphocytes Relative 28  14 - 44 % Final    Monocytes Relative 14 (*) 4 - 12 % Final    Eosinophils Relative 3  0 - 6 % Final    Basophils Relative 1  0 - 1 % Final    Neutrophils Absolute 4 05  1 85 - 7 62 Thousands/µL Final    Immature Grans Absolute 0 02  0 00 - 0 20 Thousand/uL Final    Lymphocytes Absolute 2 02  0 60 - 4 47 Thousands/µL Final    Monocytes Absolute 0 99  0 17 - 1 22 Thousand/µL Final    Eosinophils Absolute 0 22  0 00 - 0 61 Thousand/µL Final    Basophils Absolute 0 05  0 00 - 0 10 Thousands/µL Final   PROTIME-INR - Abnormal    Protime 15 3 (*) 11 6 - 14 5 seconds Final    INR 1 28 (*) 0 84 - 1 19 Final   BASIC METABOLIC PANEL - Abnormal    Sodium 138  136 - 145 mmol/L Final    Potassium 3 8  3 5 - 5 3 mmol/L Final    Chloride 100  100 - 108 mmol/L Final    CO2 28  21 - 32 mmol/L Final    ANION GAP 10  4 - 13 mmol/L Final    BUN 22  5 - 25 mg/dL Final    Creatinine 1 42 (*) 0 60 - 1 30 mg/dL Final    Comment: Standardized to IDMS reference method    Glucose 104  65 - 140 mg/dL Final    Comment: If the patient is fasting, the ADA then defines impaired fasting glucose as > 100 mg/dL and diabetes as > or equal to 123 mg/dL  Specimen collection should occur prior to Sulfasalazine administration due to the potential for falsely depressed results  Specimen collection should occur prior to Sulfapyridine administration due to the potential for falsely elevated results  Calcium 9 2  8 3 - 10 1 mg/dL Final    eGFR 40  ml/min/1 73sq m Final    Narrative:     [place] guidelines for Chronic Kidney Disease (CKD):     Stage 1 with normal or high GFR (GFR > 90 mL/min/1 73 square meters)    Stage 2 Mild CKD (GFR = 60-89 mL/min/1 73 square meters)    Stage 3A Moderate CKD (GFR = 45-59 mL/min/1 73 square meters)    Stage 3B Moderate CKD (GFR = 30-44 mL/min/1 73 square meters)    Stage 4 Severe CKD (GFR = 15-29 mL/min/1 73 square meters)    Stage 5 End Stage CKD (GFR <15 mL/min/1 73 square meters)  Note: GFR calculation is accurate only with a steady state creatinine   NT-BNP PRO (BRAIN NATRIURETIC PEPTIDE) - Abnormal    NT-proBNP 618 (*) <125 pg/mL Final   APTT - Normal    PTT 34  23 - 37 seconds Final    Comment: Therapeutic Heparin Range =  60-90 seconds   D-DIMER, QUANTITATIVE - Normal    D-Dimer, Quant <0 27  <0 50 ug/ml FEU Final    Comment: Reference and upper limits to exclude DVT and PE are the same  Do not use to exclude if clinical symptoms are present    Pregnant women:  1st trimester:  <0 22 - 1 06 ug/ml FEU  2nd trimester:  <0 22 - 1 88 ug/ml FEU  3rd trimester: 0 24 - 3 28 ug/ml FEU    Note: Normal ranges may not apply to patients who are transgender, non-binary, or whose legal sex, sex at birth, and gender identity differ  Narrative: In the evaluation for possible pulmonary embolism, in the appropriate (Well's Score of 4 or less) patient, the age adjusted d-dimer cutoff for this patient can be calculated as:    Age x 0 01 (in ug/mL) for Age-adjusted D-dimer exclusion threshold for a patient over 50 years  HS TROPONIN I 0HR - Normal    hs TnI 0hr 10  "Refer to ACS Flowchart"- see link ng/L Final    Comment:                                              Initial (time 0) result  If >=50 ng/L, Myocardial injury suggested ;  Type of myocardial injury and treatment strategy  to be determined  If 5-49 ng/L, a delta result at 2 hours and or 4 hours will be needed to further evaluate  If <4 ng/L, and chest pain has been >3 hours since onset, patient may qualify for discharge based on the HEART score in the ED  If <5 ng/L and <3hours since onset of chest pain, a delta result at 2 hours will be needed to further evaluate  HS Troponin 99th Percentile URL of a Health Population=12 ng/L with a 95% Confidence Interval of 8-18 ng/L  Second Troponin (time 2 hours)  If calculated delta >= 20 ng/L,  Myocardial injury suggested ; Type of myocardial injury and treatment strategy to be determined  If 5-49 ng/L and the calculated delta is 5-19 ng/L, consult medical service for evaluation  Continue evaluation for ischemia on ecg and other possible etiology and repeat hs troponin at 4 hours  If delta is <5 ng/L at 2 hours, consider discharge based on risk stratification via the HEART score (if in ED), or CASSIDY risk score in IP/Observation  HS Troponin 99th Percentile URL of a Health Population=12 ng/L with a 95% Confidence Interval of 8-18 ng/L     LACTIC ACID, PLASMA - Normal    LACTIC ACID 1 2  0 5 - 2 0 mmol/L Final    Narrative:     Result may be elevated if tourniquet was used during collection  PROCALCITONIN TEST - Normal    Procalcitonin 0 09  <=0 25 ng/ml Final    Comment: Suspected Lower Respiratory Tract Infection (LRTI):  - LESS than or EQUAL to 0 25 ng/mL:   low likelihood for bacterial LRTI; antibiotics DISCOURAGED   - GREATER than 0 25 ng/mL:   increased likelihood for bacterial LRTI; antibiotics ENCOURAGED  Suspected Sepsis:  - Strongly consider initiating antibiotics in ALL UNSTABLE patients  - LESS than or EQUAL to 0 5 ng/mL:   low likelihood for bacterial sepsis; antibiotics DISCOURAGED   - GREATER than 0 5 ng/mL:   increased likelihood for bacterial sepsis; antibiotics ENCOURAGED   - GREATER than 2 ng/mL:   high risk for severe sepsis / septic shock; antibiotics strongly ENCOURAGED  Decisions on antibiotic use should not be based solely on Procalcitonin (PCT) levels  If PCT is low but uncertainty exists with stopping antibiotics, repeat PCT in 6-24 hours to confirm the low level  If antibiotics are administered (regardless if initial PCT was high or low), repeat PCT every 1-2 days to consider early antibiotic cessation (when GREATER than 80% decrease from the peak OR when PCT drops below designated cutoffs, whichever comes first), so long as the infection is NOT one that typically requires prolonged treatment durations (e g , bone/joint infections, endocarditis, Staph  aureus bacteremia)      Situations of FALSE-POSITIVE Procalcitonin values:  1) Newborns < 67 hours old  2) Massive stress from severe trauma / burns, major surgery, acute pancreatitis, cardiogenic / hemorrhagic shock, sickle cell crisis, or other organ perfusion abnormalities  3) Malaria and some Candidal infections  4) Treatment with agents that stimulate cytokines (e g , OKT3, anti-lymphocyte globulins, alemtuzumab, IL-2, granulocyte transfusion [NOT GCSFs])  5) Chronic renal disease causes elevated baseline levels (consider GREATER than 0 75 ng/mL as an abnormal cut-off); initiating HD/CRRT may cause transient decreases  6) Paraneoplastic syndromes from medullary thyroid or SCLC, some forms of vasculitis, and acute [id number] disease    Situations of FALSE-NEGATIVE Procalcitonin values:  1) Too early in clinical course for PCT to have reached its peak (may repeat in 6-24 hours to confirm low level)  2) Localized infection WITHOUT systemic (SIRS / sepsis) response (e g , an abscess, osteomyelitis, cystitis)  3) Mycobacteria (e g , Tuberculosis, MAC)  4) Cystic fibrosis exacerbations           Final Diagnosis:  1  Dyspnea        P:  - patient presents for evaluation shortness of breath  Given significant cardiac history will evaluate for ACS as well as PE though this is less likely as patient is on Xarelto  CBC, BMP, BNP, troponin, EKG, chest imaging, D-dimer, pro canal, lactic   -EKG without ischemic changes  Troponin was less than 12  BNP was in line with prior values  D-dimer normal   Pro [place] also normal   Chest x-ray did not appear to have significant effusions or consolidation on my interpretation   -I discussed all the results with the patient currently I do not have a acute cause of her intermittent shortness of breath  She states that she just recently saw Cardiology and had an echocardiogram done which was normal   She has been taking all her medications and she is very strict in monitoring her weight which has been stable or decreasing as she has been attempting to lose weight as well  She states that she has follow-up with pulmonology here in the following weeks  I discussed with her that, should she continue to have shortness of breath that she should reach back out to her cardiologist as well as possibly our pulmonologist to discuss her symptoms and possible other treatment  Will provide a prednisone burst in case this is related to her underlying asthma  Return precautions discussed      Medications   predniSONE tablet 50 mg (50 mg Oral Given 3/29/22 1520)     Time reflects when diagnosis was documented in both MDM as applicable and the Disposition within this note     Time User Action Codes Description Comment    3/29/2022  3:15 PM Milus Dandy Add [R06 [id number]] Dyspnea       ED Disposition     ED Disposition Condition Date/Time Comment    Discharge Stable Tue Mar 29, 2022  3:14 PM Alexandercullennaye Knutson discharge to home/self care              Follow-up Information     Follow up With Specialties Details Why Contact Info    Your Cardiologist  Call           Discharge Medication List as of 3/29/2022  3:18 PM      START taking these medications    Details   predniSONE 20 mg tablet Take 2 5 tablets (50 mg total) by mouth daily for 4 days, Starting Tue 3/29/2022, Until Sat 4/2/2022, Normal         CONTINUE these medications which have NOT CHANGED    Details   acetaminophen (TYLENOL) 500 mg tablet Take 500 mg by mouth every 6 (six) hours as needed, Historical Med      albuterol (Ventolin HFA) 90 mcg/act inhaler Inhale 2 puffs every 6 (six) hours as needed for wheezing, Starting Mon 10/4/2021, Normal      amLODIPine (NORVASC) 2 5 mg tablet Take 5 mg by mouth daily, Historical Med      Aspirin (ASPIR-81 PO) every 24 hours, Historical Med      diclofenac sodium (VOLTAREN) 1 % Apply 2 g topically 4 (four) times a day, Historical Med      Diclofenac Sodium (VOLTAREN) 1 % Apply 2 g topically 3 (three) times a day as needed (Pain), Starting Wed 2/16/2022, Normal      dofetilide (TIKOSYN) 250 mcg capsule TAKE 1 CAPSULE BY MOUTH TWICE DAILY, Historical Med      etanercept (ENBREL SURECLICK) 50 MG/ML injection Inject 50 mg under the skin Once a week, Starting Tue 1/5/2021, Historical Med      fluticasone-salmeterol (Advair HFA) 115-21 MCG/ACT inhaler Inhale 2 puffs 2 (two) times a day Rinse mouth after use , Starting Tue 10/12/2021, Normal      furosemide (LASIX) 40 mg tablet Take 1 tablet by mouth see administration instructions 1 tablet on Mon, Wed, Fri 1/2 tablet on Tues, Thurs, Sat, Sun , Starting Tue 6/19/2018, Historical Med      Magnesium 200 MG TABS Take 1 tablet by mouth daily, Historical Med      metoprolol succinate (TOPROL-XL) 25 mg 24 hr tablet Take 25 mg by mouth 2 (two) times a day , Starting Thu 11/23/2017, Historical Med      montelukast (SINGULAIR) 10 mg tablet TAKE 1 TABLET BY MOUTH ONCE DAILY AT BEDTIME, Normal      Multiple Vitamins-Minerals (MULTIVITAMIN ADULT PO) Take 2 tablets by mouth, Historical Med      rosuvastatin (CRESTOR) 20 MG tablet Take 20 mg by mouth, Starting Mon 3/26/2018, Historical Med      XARELTO 20 MG tablet Starting Thu 7/5/2018, Historical Med           No discharge procedures on file  Prior to Admission Medications   Prescriptions Last Dose Informant Patient Reported? Taking? Aspirin (ASPIR-81 PO)  Self Yes No   Sig: every 24 hours   Diclofenac Sodium (VOLTAREN) 1 %   No No   Sig: Apply 2 g topically 3 (three) times a day as needed (Pain)   Magnesium 200 MG TABS  Self Yes No   Sig: Take 1 tablet by mouth daily   Multiple Vitamins-Minerals (MULTIVITAMIN ADULT PO)  Self Yes No   Sig: Take 2 tablets by mouth   XARELTO 20 MG tablet  Self Yes No   acetaminophen (TYLENOL) 500 mg tablet  Self Yes No   Sig: Take 500 mg by mouth every 6 (six) hours as needed   albuterol (Ventolin HFA) 90 mcg/act inhaler  Self No No   Sig: Inhale 2 puffs every 6 (six) hours as needed for wheezing   amLODIPine (NORVASC) 2 5 mg tablet  Self Yes No   Sig: Take 5 mg by mouth daily   diclofenac sodium (VOLTAREN) 1 %  Self Yes No   Sig: Apply 2 g topically 4 (four) times a day   dofetilide (TIKOSYN) 250 mcg capsule  Self Yes No   Sig: TAKE 1 CAPSULE BY MOUTH TWICE DAILY   etanercept (ENBREL SURECLICK) 50 MG/ML injection  Self Yes No   Sig: Inject 50 mg under the skin Once a week   fluticasone-salmeterol (Advair HFA) 115-21 MCG/ACT inhaler  Self No No   Sig: Inhale 2 puffs 2 (two) times a day Rinse mouth after use     furosemide (LASIX) 40 mg tablet  Self Yes No   Sig: Take 1 tablet by mouth see administration instructions 1 tablet on Mon, Wed, Fri 1/2 tablet on Tues, Thurs, Sat, Sun    metoprolol succinate (TOPROL-XL) 25 mg 24 hr tablet  Self Yes No   Sig: Take 25 mg by mouth 2 (two) times a day    montelukast (SINGULAIR) 10 mg tablet   No No   Sig: TAKE 1 TABLET BY MOUTH ONCE DAILY AT BEDTIME   rosuvastatin (CRESTOR) 20 MG tablet  Self Yes No   Sig: Take 20 mg by mouth      Facility-Administered Medications Last Administration Doses Remaining   levalbuterol (XOPENEX HFA) inhaler 1 puff None recorded           Portions of the record may have been created with voice recognition software  Occasional wrong word or "sound a like" substitutions may have occurred due to the inherent limitations of voice recognition software  Read the chart carefully and recognize, using context, where substitutions have occurred      Electronically signed by:  MD Bijal Bose MD  03/29/22 [id number]

## 2022-03-29 NOTE — PROGRESS NOTES
[facility]        NAME: Chantel Haq is a 59 y o  female  : 1965    MRN: [id number]  DATE: 2022  TIME: 1:36 PM    Assessment and Plan   SOB (shortness of breath) [R06 02]  1  SOB (shortness of breath)  ECG 12 lead    Transfer to other facility         Patient Instructions       Go directly to the emergency room for further evaluation  Chief Complaint     Chief Complaint   Patient presents with    Shortness of Breath     hx of asthma, since  she has had increased trouble breathing, pt  using inhalers without relief, pt  reports feeling lightheaded and dizzy and has a hard time catching her breathe, increased with exertion, pt  reports she "feels different" than her normal SOB, denies chest pain, reprorts left arm numbness but resolved on it's own         History of Present Illness       Patient presents with a 2 day history of shortness of breath  She has an extensive cardiac history including coronary artery disease, AFib, COPD, asthma, PVD with stent placement  She has been using her inhalers with no improvement  She states when she has the shortness of breath on exertion she feels weakness in her lower extremities becomes dizzy and feel like she is going to pass out  She denies any chest pain or shortness of breath  Recently has seen her cardiologist earlier this month  She denies chest pain but repetitively states "there is something not right"  Review of Systems   Review of Systems   Constitutional: Negative for chills and fever  Respiratory: Positive for shortness of breath  Negative for chest tightness  Cardiovascular: Negative for chest pain  Neurological: Positive for dizziness           Current Medications       Current Outpatient Medications:     acetaminophen (TYLENOL) 500 mg tablet, Take 500 mg by mouth every 6 (six) hours as needed, Disp: , Rfl:     albuterol (Ventolin HFA) 90 mcg/act inhaler, Inhale 2 puffs every 6 (six) hours as needed for wheezing, Disp: 18 g, Rfl: 3    amLODIPine (NORVASC) 2 5 mg tablet, Take 5 mg by mouth daily, Disp: , Rfl:     Aspirin (ASPIR-81 PO), every 24 hours, Disp: , Rfl:     diclofenac sodium (VOLTAREN) 1 %, Apply 2 g topically 4 (four) times a day, Disp: , Rfl:     Diclofenac Sodium (VOLTAREN) 1 %, Apply 2 g topically 3 (three) times a day as needed (Pain), Disp: 150 g, Rfl: 3    dofetilide (TIKOSYN) 250 mcg capsule, TAKE 1 CAPSULE BY MOUTH TWICE DAILY, Disp: , Rfl:     etanercept (ENBREL SURECLICK) 50 MG/ML injection, Inject 50 mg under the skin Once a week, Disp: , Rfl:     fluticasone-salmeterol (Advair HFA) 115-21 MCG/ACT inhaler, Inhale 2 puffs 2 (two) times a day Rinse mouth after use , Disp: 12 g, Rfl: 5    furosemide (LASIX) 40 mg tablet, Take 1 tablet by mouth see administration instructions 1 tablet on Mon, Wed, Fri 1/2 tablet on Tues, Thurs, Sat, Sun , Disp: , Rfl:     Magnesium 200 MG TABS, Take 1 tablet by mouth daily, Disp: , Rfl:     metoprolol succinate (TOPROL-XL) 25 mg 24 hr tablet, Take 25 mg by mouth 2 (two) times a day , Disp: , Rfl:     montelukast (SINGULAIR) 10 mg tablet, TAKE 1 TABLET BY MOUTH ONCE DAILY AT BEDTIME, Disp: 30 tablet, Rfl: 0    Multiple Vitamins-Minerals (MULTIVITAMIN ADULT PO), Take 2 tablets by mouth, Disp: , Rfl:     rosuvastatin (CRESTOR) 20 MG tablet, Take 20 mg by mouth, Disp: , Rfl:     XARELTO 20 MG tablet, , Disp: , Rfl:     Current Facility-Administered Medications:     levalbuterol (XOPENEX HFA) inhaler 1 puff, 1 puff, Inhalation, Q6H PRN, Stas Manning MD    Current Allergies     Allergies as of 03/29/2022 - Reviewed 03/29/2022   Allergen Reaction Noted    Albuterol Tachycardia 06/06/2018    Alprazolam Hives 08/13/2020    Dust mite extract Shortness Of Breath 09/16/2020    Umeclidinium-vilanterol Tachycardia 06/06/2018    Calcipotriene Rash 09/16/2020            The following portions of the patient's history were reviewed and updated as appropriate: allergies, current medications, past family history, past medical history, past social history, past surgical history and problem list      Past Medical History:   Diagnosis Date    Allergic     Anxiety     Atrial [facility]     Benign hypertensive heart and CKD, stage 3 (GFR 30-59), w CHF ([facility] [facility] ) 9/5/2018    COVID-19     Depression     Heart attack ([facility] [facility]     Hypertension     [facility]     SOB (shortness of breath)     chronic       Past Surgical History:   Procedure Laterality Date    ABLATION COLPOCLESIS      ANGIOPLASTY Bilateral     stent placement behind knee    HYSTERECTOMY      KNEE SURGERY Left 1980    NECK SURGERY  2006    plate (C7)       Family History   Problem Relation Age of Onset    Osteoporosis Mother     Stroke Sister     Atrial fibrillation Sister     Cancer Brother          Medications have been verified  Objective   /68   Pulse 83   Temp (!) 97 °F (36 1 °C)   Resp 18   Ht 5' 10" (1 778 m)   Wt 93 9 kg (207 lb)   SpO2 99%   BMI 29 70 kg/m²   No LMP recorded  Patient has had a hysterectomy  Physical Exam     Physical Exam  Vitals and nursing note reviewed  Constitutional:       Appearance: She is well-developed  HENT:      Head: Normocephalic and atraumatic  Cardiovascular:      Rate and Rhythm: Normal rate and regular rhythm  Heart sounds: Normal heart sounds  Pulmonary:      Effort: Pulmonary effort is normal       Breath sounds: Normal breath sounds  Skin:     General: Skin is warm  Neurological:      Mental Status: She is alert  Ambulatory pulse ox saturation 96-98 percent on room air, pulse in 80s  Spoke with [facility] emergency room regarding patient  Patient refused transportation to the emergency room by EMS  Patient states significant other will transport her to ER

## 2022-03-29 NOTE — ED NOTES
Xray at bedside      Lauren Marie, [facility]  03/29/22 [id number]

## 2022-03-29 NOTE — Clinical Note
[place] Cap was seen and treated in our emergency department on 3/29/2022  Diagnosis:     Adrian Alaniz  may return to work on return date  She may return on this date: 03/31/2022         If you have any questions or concerns, please don't hesitate to call        Deon Dakin, MD    ______________________________           _______________          _______________  Hospital Representative                              Date                                Time

## 2022-03-30 LAB
ATRIAL RATE: 75 BPM
P AXIS: 42 DEGREES
PR INTERVAL: 194 MS
QRS AXIS: 71 DEGREES
QRSD INTERVAL: 94 MS
QT INTERVAL: 390 MS
QTC INTERVAL: 435 MS
T WAVE AXIS: 55 DEGREES
VENTRICULAR RATE: 75 BPM

## 2022-03-30 PROCEDURE — 93010 ELECTROCARDIOGRAM REPORT: CPT | Performed by: INTERNAL MEDICINE

## 2022-04-26 ENCOUNTER — TELEPHONE (OUTPATIENT)
Dept: FAMILY MEDICINE CLINIC | Facility: CLINIC | Age: 57
End: 2022-04-26

## 2022-04-28 DIAGNOSIS — J45.20 INTERMITTENT ASTHMA, UNSPECIFIED ASTHMA SEVERITY, UNSPECIFIED WHETHER COMPLICATED: ICD-10-CM

## 2022-04-28 RX ORDER — MONTELUKAST SODIUM 10 MG/1
TABLET ORAL
Qty: 30 TABLET | Refills: 0 | Status: SHIPPED | OUTPATIENT
Start: 2022-04-28 | End: 2022-05-16

## 2022-05-02 ENCOUNTER — OFFICE VISIT (OUTPATIENT)
Dept: PULMONOLOGY | Facility: CLINIC | Age: 57
End: 2022-05-02
Payer: COMMERCIAL

## 2022-05-02 VITALS
WEIGHT: 213.8 LBS | TEMPERATURE: 98.2 F | SYSTOLIC BLOOD PRESSURE: 136 MMHG | HEART RATE: 66 BPM | DIASTOLIC BLOOD PRESSURE: 75 MMHG | OXYGEN SATURATION: 96 % | HEIGHT: 70 IN | BODY MASS INDEX: 30.61 KG/M2

## 2022-05-02 DIAGNOSIS — J43.2 CENTRILOBULAR EMPHYSEMA (HCC): Chronic | ICD-10-CM

## 2022-05-02 DIAGNOSIS — I48.19 PERSISTENT ATRIAL FIBRILLATION (HCC): ICD-10-CM

## 2022-05-02 DIAGNOSIS — J45.21 MILD INTERMITTENT ASTHMA WITH ACUTE EXACERBATION: ICD-10-CM

## 2022-05-02 DIAGNOSIS — R06.00 DOE (DYSPNEA ON EXERTION): Primary | ICD-10-CM

## 2022-05-02 DIAGNOSIS — F17.211 CIGARETTE NICOTINE DEPENDENCE IN REMISSION: ICD-10-CM

## 2022-05-02 DIAGNOSIS — E66.09 CLASS 1 OBESITY DUE TO EXCESS CALORIES WITH SERIOUS COMORBIDITY AND BODY MASS INDEX (BMI) OF 30.0 TO 30.9 IN ADULT: ICD-10-CM

## 2022-05-02 PROCEDURE — 99214 OFFICE O/P EST MOD 30 MIN: CPT | Performed by: PHYSICIAN ASSISTANT

## 2022-05-02 RX ORDER — LISINOPRIL 5 MG/1
TABLET ORAL
COMMUNITY
Start: 2022-03-01

## 2022-05-02 NOTE — ASSESSMENT & PLAN NOTE
Weight loss encouraged  Recommended lifestyle modifications including decreased caloric intake, healthier diet options and increase activity as tolerated

## 2022-05-02 NOTE — PROGRESS NOTES
Pulmonary Follow Up Note   Denisse Tilley 59 y o  female MRN: [id number]  5/2/2022      Assessment:    SHELTON (dyspnea on exertion)  Symptoms do not sound like they are related to her underlying asthma  Likely multifactorial in the setting of deconditioning, obesity, CAD, and possibly worsening obstructive lung disease  Recommended updating pulmonary function tests to compare  Will also check low-dose lung cancer screening CT to review lung parenchyma  If workup does not reveal a clear etiology for her worsening dyspnea on exertion would recommend she follow-up with her cardiologist     Pulmonary emphysema ([facility] [address] )  No fixed obstruction on last pulmonary function test   Will update these now given worsening symptoms  Continue Advair HFA 2 puffs b i d  And p r n  Albuterol  Mild intermittent asthma with acute exacerbation  Continue Advair HFA 2 puffs twice daily  Continue p r n  Albuterol  Continue singular  Cigarette nicotine dependence in remission  Remains committed to abstinence from nicotine and tobacco   Is a candidate for low-dose lung cancer screening CT based off past history and age  We discussed the benefits of screening in detail today and after discussion she was agreeable  Order placed  Will call her with results  Class 1 obesity due to excess calories with serious comorbidity and body mass index (BMI) of 30 0 to 30 9 in adult  Weight loss encouraged  Recommended lifestyle modifications including decreased caloric intake, healthier diet options and increase activity as tolerated  Plan:    Diagnoses and all orders for this visit:    SHELTON (dyspnea on exertion)  -     Complete PFT with post Bronchodilator and Six Minute walk;  Future    Mild intermittent asthma with acute exacerbation    Persistent atrial fibrillation (HCC)    Cigarette nicotine dependence in remission  -     CT lung screening program; Future    Class 1 obesity due to excess calories with serious comorbidity and body mass index (BMI) of 30 0 to 30 9 in adult    Centrilobular emphysema (HCC)    Other orders  -     lisinopril (ZESTRIL) 5 mg tablet; TAKE 1 TABLET BY MOUTH ONCE DAILY PLEASE CALL [phone] FOR AUG APPOINTMENT        Return in about 3 months (around 8/2/2022)  History of Present Illness   HPI:  Hosey Councilman is a 59 y o  female who presents the office today for routine follow-up asthma and emphysema  Patient was last seen in our office in October  For the last 3-6 months she relates that her dyspnea has been worsening  Denies shortness breath at rest but does endorse dyspnea on exertion especially while working  Denies cough, sputum production, hemoptysis or wheeze  No chest pain or palpitations  Does endorse lower extremity edema this is well controlled on her diuretic  Patient uses her rescue inhaler and Advair and feels that neither of them work  Does not think singular works either  Denies allergies  Patient is a former smoker with a quit date 6 years ago  Review of Systems   All other systems reviewed and are negative        Historical Information   Past Medical History:   Diagnosis Date    Allergic     Anxiety     Atrial fibrillation ([address] )     Benign hypertensive heart and CKD, stage 3 (GFR 30-59), w CHF ([address] ) 9/5/2018    COVID-19     Depression     Heart attack ([address] )     Hypertension     Psoriatic arthritis ([address] )     SOB (shortness of breath)     chronic     Past Surgical History:   Procedure Laterality Date    ABLATION COLPOCLESIS      ANGIOPLASTY Bilateral     stent placement behind knee    HYSTERECTOMY      KNEE SURGERY Left 1980    NECK SURGERY  2006    plate (C7)     Family History   Problem Relation Age of Onset    Osteoporosis Mother     Stroke Sister     Atrial fibrillation Sister     Cancer Brother        Social History     Tobacco Use   Smoking Status Former Smoker    Packs/day: 0 75    Years: 39 00    Pack years: 29 25    Types: Cigarettes    Start date: 1/8/1977  Quit date: 2016    Years since quittin 3   Smokeless Tobacco Never Used   Tobacco Comment    no passive smoke exposure         Meds/Allergies     Current Outpatient Medications:     acetaminophen (TYLENOL) 500 mg tablet, Take 500 mg by mouth every 6 (six) hours as needed, Disp: , Rfl:     amLODIPine (NORVASC) 2 5 mg tablet, Take 5 mg by mouth daily, Disp: , Rfl:     Aspirin (ASPIR-81 PO), every 24 hours, Disp: , Rfl:     diclofenac sodium (VOLTAREN) 1 %, Apply 2 g topically 4 (four) times a day, Disp: , Rfl:     dofetilide (TIKOSYN) 250 mcg capsule, TAKE 1 CAPSULE BY MOUTH TWICE DAILY, Disp: , Rfl:     etanercept (ENBREL SURECLICK) 50 MG/ML injection, Inject 50 mg under the skin Once a week, Disp: , Rfl:     furosemide (LASIX) 40 mg tablet, Take 1 tablet by mouth see administration instructions 1 tablet on Mon, Wed, Fri 1/2 tablet on Tues, Thurs, Sat, Sun , Disp: , Rfl:     Magnesium 200 MG TABS, Take 1 tablet by mouth daily, Disp: , Rfl:     metoprolol succinate (TOPROL-XL) 25 mg 24 hr tablet, Take 25 mg by mouth 2 (two) times a day , Disp: , Rfl:     montelukast (SINGULAIR) 10 mg tablet, TAKE 1 TABLET BY MOUTH ONCE DAILY AT BEDTIME, Disp: 30 tablet, Rfl: 0    Multiple Vitamins-Minerals (MULTIVITAMIN ADULT PO), Take 2 tablets by mouth, Disp: , Rfl:     rosuvastatin (CRESTOR) 20 MG tablet, Take 20 mg by mouth, Disp: , Rfl:     XARELTO 20 MG tablet, , Disp: , Rfl:     albuterol (Ventolin HFA) 90 mcg/act inhaler, Inhale 2 puffs every 6 (six) hours as needed for wheezing (Patient not taking: Reported on 2022 ), Disp: 18 g, Rfl: 3    Diclofenac Sodium (VOLTAREN) 1 %, Apply 2 g topically 3 (three) times a day as needed (Pain) (Patient not taking: Reported on 2022 ), Disp: 150 g, Rfl: 3    fluticasone-salmeterol (Advair HFA) 115-21 MCG/ACT inhaler, Inhale 2 puffs 2 (two) times a day Rinse mouth after use   (Patient not taking: Reported on 2022 ), Disp: 12 g, Rfl: 5    lisinopril (ZESTRIL) 5 mg tablet, TAKE 1 TABLET BY MOUTH ONCE DAILY PLEASE CALL [phone] FOR AUG APPOINTMENT, Disp: , Rfl:     Current Facility-Administered Medications:     levalbuterol (XOPENEX HFA) inhaler 1 puff, 1 puff, Inhalation, Q6H PRN, Meghan Rodríguez MD  Allergies   Allergen Reactions    Albuterol Tachycardia     Afib  Afib  Afib    Alprazolam Hives    Dust Mite Extract Shortness Of Breath    Umeclidinium-Vilanterol Tachycardia     Afib  Afib  Afib    Calcipotriene Rash       Vitals: Blood pressure 136/75, pulse 66, temperature 98 2 °F (36 8 °C), temperature source Tympanic, height 5' 10" (1 778 m), weight 97 kg (213 lb 12 8 oz), SpO2 96 %  Body mass index is 30 68 kg/m²  Oxygen Therapy  SpO2: 96 %  Oxygen Therapy: None (Room air)    Physical Exam  Physical Exam  Vitals reviewed  Constitutional:       Appearance: Normal appearance  She is well-developed  She is obese  HENT:      Head: Normocephalic and atraumatic  Nose: Nose normal       Mouth/Throat:      Mouth: Mucous membranes are moist       Pharynx: Oropharynx is clear  Eyes:      Extraocular Movements: Extraocular movements intact  Cardiovascular:      Rate and Rhythm: Normal rate and regular rhythm  Pulses: Normal pulses  Heart sounds: Normal heart sounds  No murmur heard  Pulmonary:      Effort: Pulmonary effort is normal  No respiratory distress  Breath sounds: No wheezing, rhonchi or rales  Abdominal:      Palpations: Abdomen is soft  Tenderness: There is no abdominal tenderness  Musculoskeletal:         General: No swelling or tenderness  Normal range of motion  Cervical back: Normal range of motion and neck supple  Skin:     General: Skin is warm and dry  Neurological:      General: No focal deficit present  Mental Status: She is alert  Mental status is at baseline  Psychiatric:         Mood and Affect: Mood normal          Behavior: Behavior normal          Labs:  I have personally reviewed pertinent lab results  , ABG: No results found for: PHART, [id number], PO2ART, CJP6IWW, S8BRYGEI, BEART, SOURCE, BNP: No results found for: BNP, CBC: No results found for: WBC, HGB, HCT, MCV, PLT, ADJUSTEDWBC, MCH, MCHC, RDW, MPV, NRBC, CMP: No results found for: SODIUM, K, CL, CO2, ANIONGAP, BUN, CREATININE, GLUCOSE, CALCIUM, AST, ALT, ALKPHOS, PROT, BILITOT, EGFR, PT/INR: No results found for: PT, INR, Troponin: No results found for: TROPONINI  Lab Results   Component Value Date    WBC 7 35 03/29/2022    HGB 15 3 03/29/2022    HCT 44 8 03/29/2022    MCV 93 03/29/2022     03/29/2022     Lab Results   Component Value Date    GLUCOSE 100 03/02/2015    CALCIUM 9 2 03/29/2022     03/02/2015    K 3 8 03/29/2022    CO2 28 03/29/2022     03/29/2022    BUN 22 03/29/2022    CREATININE 1 42 (H) 03/29/2022     No results found for: IGE  Lab Results   Component Value Date    ALT 31 08/06/2018    AST 19 08/06/2018    ALKPHOS 55 08/06/2018    BILITOT 0 7 03/02/2015       Imaging and other studies: I have personally reviewed pertinent reports  and I have personally reviewed pertinent films in PACS     Chest x-ray 03/29/2022  Lungs are clear without acute infiltrates, pneumothorax, pleural effusion  Pulmonary function testing:  Performed 12/10/2018  FEV1/FVC ratio 75%   FEV1 112% predicted  % predicted  no response to bronchodilators   % predicted  RV 86 % predicted  DLCO corrected for hemoglobin 97 % predicted  Normal spirometry, lung volumes and diffusion capacity    Bronchial challenge with methacholine 01/16/2019  Conclusion:  Patient had a greater than 20% decrease in FVC and FEV1 add a small to moderate dose of methacholine indicating moderate bronchial hyperresponsiveness consistent with asthma    Other Studies: I have personally reviewed pertinent reports  Echo 11/05/2021  EF 50-55%  Grade 1 diastolic dysfunction    Right ventricular size and systolic function normal   Mild mitral regurgitation  Estimated pulmonary artery systolic pressure 26 1 mm Hg

## 2022-05-02 NOTE — ASSESSMENT & PLAN NOTE
Symptoms do not sound like they are related to her underlying asthma  Likely multifactorial in the setting of deconditioning, obesity, CAD, and possibly worsening obstructive lung disease  Recommended updating pulmonary function tests to compare  Will also check low-dose lung cancer screening CT to review lung parenchyma    If workup does not reveal a clear etiology for her worsening dyspnea on exertion would recommend she follow-up with her cardiologist

## 2022-05-02 NOTE — ASSESSMENT & PLAN NOTE
No fixed obstruction on last pulmonary function test   Will update these now given worsening symptoms  Continue Advair HFA 2 puffs b i d  And p r n  Albuterol

## 2022-05-02 NOTE — ASSESSMENT & PLAN NOTE
Remains committed to abstinence from nicotine and tobacco   Is a candidate for low-dose lung cancer screening CT based off past history and age  We discussed the benefits of screening in detail today and after discussion she was agreeable  Order placed  Will call her with results

## 2022-05-17 ENCOUNTER — TELEPHONE (OUTPATIENT)
Dept: OBGYN CLINIC | Facility: HOSPITAL | Age: 57
End: 2022-05-17

## 2022-05-17 DIAGNOSIS — M17.12 PRIMARY OSTEOARTHRITIS OF LEFT KNEE: Primary | ICD-10-CM

## 2022-06-07 ENCOUNTER — HOSPITAL ENCOUNTER (OUTPATIENT)
Dept: PULMONOLOGY | Facility: HOSPITAL | Age: 57
Discharge: HOME | End: 2022-06-07
Payer: COMMERCIAL

## 2022-06-07 ENCOUNTER — HOSPITAL ENCOUNTER (OUTPATIENT)
Dept: CT IMAGING | Facility: HOSPITAL | Age: 57
Discharge: HOME | End: 2022-06-07
Payer: COMMERCIAL

## 2022-06-07 DIAGNOSIS — R06.00 DOE (DYSPNEA ON EXERTION): ICD-10-CM

## 2022-06-07 DIAGNOSIS — F17.211 CIGARETTE NICOTINE DEPENDENCE IN REMISSION: ICD-10-CM

## 2022-06-07 PROCEDURE — 94618 PULMONARY STRESS TESTING: CPT | Performed by: INTERNAL MEDICINE

## 2022-06-07 PROCEDURE — 94729 DIFFUSING CAPACITY: CPT | Performed by: INTERNAL MEDICINE

## 2022-06-07 PROCEDURE — 94761 N-INVAS EAR/PLS OXIMETRY MLT: CPT

## 2022-06-07 PROCEDURE — 94726 PLETHYSMOGRAPHY LUNG VOLUMES: CPT | Performed by: INTERNAL MEDICINE

## 2022-06-07 PROCEDURE — 94060 EVALUATION OF WHEEZING: CPT | Performed by: INTERNAL MEDICINE

## 2022-06-07 PROCEDURE — 94729 DIFFUSING CAPACITY: CPT

## 2022-06-07 PROCEDURE — 94060 EVALUATION OF WHEEZING: CPT

## 2022-06-07 PROCEDURE — 71271 CT THORAX LUNG CANCER SCR C-: CPT

## 2022-06-07 PROCEDURE — 94726 PLETHYSMOGRAPHY LUNG VOLUMES: CPT

## 2022-06-07 RX ORDER — ALBUTEROL SULFATE 2.5 MG/3ML
2.5 SOLUTION RESPIRATORY (INHALATION) ONCE AS NEEDED
Status: COMPLETED | OUTPATIENT
Start: 2022-06-07 | End: 2022-06-07

## 2022-06-07 RX ADMIN — ALBUTEROL SULFATE 2.5 MG: 2.5 SOLUTION RESPIRATORY (INHALATION) at 10:29

## 2022-08-22 ENCOUNTER — OFFICE VISIT (OUTPATIENT)
Dept: PULMONOLOGY | Facility: CLINIC | Age: 57
End: 2022-08-22
Payer: COMMERCIAL

## 2022-08-22 VITALS
HEART RATE: 65 BPM | TEMPERATURE: 96.7 F | WEIGHT: 214 LBS | DIASTOLIC BLOOD PRESSURE: 68 MMHG | OXYGEN SATURATION: 97 % | HEIGHT: 70 IN | SYSTOLIC BLOOD PRESSURE: 139 MMHG | BODY MASS INDEX: 30.64 KG/M2

## 2022-08-22 DIAGNOSIS — J43.2 CENTRILOBULAR EMPHYSEMA (HCC): Chronic | ICD-10-CM

## 2022-08-22 DIAGNOSIS — J45.40 MODERATE PERSISTENT ASTHMA WITHOUT COMPLICATION: Primary | ICD-10-CM

## 2022-08-22 DIAGNOSIS — F17.211 CIGARETTE NICOTINE DEPENDENCE IN REMISSION: ICD-10-CM

## 2022-08-22 PROCEDURE — 99214 OFFICE O/P EST MOD 30 MIN: CPT | Performed by: PHYSICIAN ASSISTANT

## 2022-08-22 RX ORDER — PREDNISONE 10 MG/1
40 TABLET ORAL DAILY
Qty: 20 TABLET | Refills: 0 | Status: SHIPPED | OUTPATIENT
Start: 2022-08-22

## 2022-08-22 RX ORDER — FLUTICASONE PROPIONATE AND SALMETEROL XINAFOATE 115; 21 UG/1; UG/1
2 AEROSOL, METERED RESPIRATORY (INHALATION) 2 TIMES DAILY
Qty: 12 G | Refills: 5 | Status: SHIPPED | OUTPATIENT
Start: 2022-08-22

## 2022-08-22 RX ORDER — ALBUTEROL SULFATE 90 UG/1
2 AEROSOL, METERED RESPIRATORY (INHALATION) EVERY 6 HOURS PRN
Qty: 18 G | Refills: 5 | Status: SHIPPED | OUTPATIENT
Start: 2022-08-22

## 2022-08-22 NOTE — PROGRESS NOTES
Pulmonary Follow Up Note   Kole Weldon 59 y o  female MRN: [id number]  8/22/2022      Assessment:    Moderate persistent asthma without complication  Reviewed pulmonary function tests that Ba Aggarwal had prior to this visit  No fixed obstruction but she does have evidence of mild air trapping which could be consistent with her asthma  I stressed the importance of compliance to her maintenance inhaler  I recommended she use Advair /21 mcg 2 puffs twice daily consistently  She was reminded to rinse her mouth out after each use  Also represcribed albuterol HFA 2 puffs q 6 hours p r n     She knows that she may have had some tachycardia with this medication but also notes that it was around the same time that she had atrial fibrillation and now that is well controlled  She is willing to try albuterol again but knows to call our office if tachycardia resumes  In the event her symptoms still remain uncontrolled will increase ics dose in her Advair inhaler  Prescribed prednisone 40 mg po daily x5 days to start now in the hopes it will help get us back on track  Pulmonary emphysema ([address] )  Seen on her lung cancer screening CTs  No fixed obstruction on pulmonary function test   Inhalers as above  Cigarette nicotine dependence in remission  Remains committed to abstinence from nicotine and tobacco   Reviewed results of lung cancer screening CT she had in June  No new or suspicious pulmonary nodules  Will continue annual screening next due June 2023  Plan:    Diagnoses and all orders for this visit:    Moderate persistent asthma without complication  -     predniSONE 10 mg tablet; Take 4 tablets (40 mg total) by mouth daily  -     fluticasone-salmeterol (Advair HFA) 115-21 MCG/ACT inhaler; Inhale 2 puffs 2 (two) times a day Rinse mouth after use  -     albuterol (Ventolin HFA) 90 mcg/act inhaler;  Inhale 2 puffs every 6 (six) hours as needed for wheezing    Centrilobular emphysema (HCC)  -     albuterol (Ventolin HFA) 90 mcg/act inhaler; Inhale 2 puffs every 6 (six) hours as needed for wheezing    Cigarette nicotine dependence in remission        Return in about 6 months (around 2/22/2023)  History of Present Illness   HPI:  Marcelo Humphrey is a 59 y o  female who presents the office today for routine follow-up  Patient was last seen in our office 3 months ago for dyspnea on exertion, history of asthma/emphysema without obstruction on pulmonary function tests, nicotine dependence in remission, obesity, CAD, PAD s/p stents, persistent atrial fibrillation s/p ablation 2/2018 now on dofetilide, psoriatic arthritis on Enbrel, OA on Voltaren gel with plans to have left knee replacement at some point in the future  In the last 3 months, patient reports that she has not required systemic steroids, antibiotics or been hospitalized for respiratory related illness  She does notice that she still has flares worsening shortness of breath/chest tightness especially 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  Exacerbating factors include dust, odors, heat  Things at work include cool temperatures, inhaler, singular  Patient denies significant cough or sputum production  No hemoptysis  Denies audible wheeze  Upon further questioning it appears that patient does not have a rescue inhaler anymore and is been using Advair as a rescue not very frequently  Review of Systems   All other systems reviewed and are negative        Historical Information   Past Medical History:   Diagnosis Date    Allergic     Anxiety     Atrial fibrillation ([address] )     Benign hypertensive heart and CKD, stage 3 (GFR 30-59), w CHF ([address] ) 9/5/2018    COVID-19     Depression     Heart attack ([address] )     Hypertension     Psoriatic arthritis (HCC)     SOB (shortness of breath)     chronic     Past Surgical History:   Procedure Laterality Date    ABLATION COLPOCLESIS      ANGIOPLASTY Bilateral     stent placement behind knee    HYSTERECTOMY      KNEE SURGERY Left 1980    NECK SURGERY  2006    plate (C7)     Family History   Problem Relation Age of Onset    Osteoporosis Mother     Stroke Sister     Atrial fibrillation Sister     Cancer Brother        Social History     Tobacco Use   Smoking Status Former Smoker    Packs/day: 0 75    Years: 39 00    Pack years: 29 25    Types: Cigarettes    Start date: 1977   [facility] Quit date: 2016    Years since quittin 6   Smokeless Tobacco Never Used   Tobacco Comment    no passive smoke exposure         Meds/Allergies     Current Outpatient Medications:     acetaminophen (TYLENOL) 500 mg tablet, Take 500 mg by mouth every 6 (six) hours as needed, Disp: , Rfl:     albuterol (Ventolin HFA) 90 mcg/act inhaler, Inhale 2 puffs every 6 (six) hours as needed for wheezing, Disp: 18 g, Rfl: 5    Aspirin (ASPIR-81 PO), every 24 hours, Disp: , Rfl:     diclofenac sodium (VOLTAREN) 1 %, Apply 2 g topically 4 (four) times a day, Disp: , Rfl:     dofetilide (TIKOSYN) 250 mcg capsule, TAKE 1 CAPSULE BY MOUTH TWICE DAILY, Disp: , Rfl:     etanercept (ENBREL SURECLICK) 50 MG/ML injection, Inject 50 mg under the skin Once a week, Disp: , Rfl:     fluticasone-salmeterol (Advair HFA) 115-21 MCG/ACT inhaler, Inhale 2 puffs 2 (two) times a day Rinse mouth after use , Disp: 12 g, Rfl: 5    furosemide (LASIX) 40 mg tablet, Take 1 tablet by mouth see administration instructions 1 tablet on Mon, Wed, Fri 1/2 tablet on Tues, Thurs, Sat, Sun , Disp: , Rfl:     lisinopril (ZESTRIL) 5 mg tablet, TAKE 1 TABLET BY MOUTH ONCE DAILY PLEASE CALL [phone] FOR AUG APPOINTMENT, Disp: , Rfl:     Magnesium 200 MG TABS, Take 1 tablet by mouth daily, Disp: , Rfl:     metoprolol succinate (TOPROL-XL) 25 mg 24 hr tablet, Take 25 mg by mouth 2 (two) times a day , Disp: , Rfl:     montelukast (SINGULAIR) 10 mg tablet, TAKE 1 TABLET BY MOUTH ONCE DAILY AT BEDTIME, Disp: 30 tablet, Rfl: 5    Multiple Vitamins-Minerals (MULTIVITAMIN ADULT PO), Take 2 tablets by mouth, Disp: , Rfl:     predniSONE 10 mg tablet, Take 4 tablets (40 mg total) by mouth daily, Disp: 20 tablet, Rfl: 0    rosuvastatin (CRESTOR) 20 MG tablet, Take 20 mg by mouth, Disp: , Rfl:     XARELTO 20 MG tablet, , Disp: , Rfl:     amLODIPine (NORVASC) 2 5 mg tablet, Take 5 mg by mouth daily, Disp: , Rfl:     Diclofenac Sodium (VOLTAREN) 1 %, Apply 2 g topically 3 (three) times a day as needed (Pain) (Patient not taking: No sig reported), Disp: 150 g, Rfl: 3    Current Facility-Administered Medications:     levalbuterol (XOPENEX HFA) inhaler 1 puff, 1 puff, Inhalation, Q6H PRN, Naseem Funk MD  Allergies   Allergen Reactions    Albuterol Tachycardia     Afib  Afib  Afib    Alprazolam Hives    Dust Mite Extract Shortness Of Breath    Umeclidinium-Vilanterol Tachycardia     Afib  Afib  Afib    Calcipotriene Rash       Vitals: Blood pressure 139/68, pulse 65, temperature (!) 96 7 °F (35 9 °C), temperature source Tympanic, height 5' 10" (1 778 m), weight 97 1 kg (214 lb), SpO2 97 %  Body mass index is 30 71 kg/m²  Oxygen Therapy  SpO2: 97 %  Oxygen Therapy: None (Room air)    Physical Exam  Physical Exam  Vitals reviewed  Constitutional:       Appearance: Normal appearance  She is well-developed  HENT:      Head: Normocephalic and atraumatic  Nose: Nose normal       Mouth/Throat:      Mouth: Mucous membranes are moist       Pharynx: Oropharynx is clear  Eyes:      Extraocular Movements: Extraocular movements intact  Cardiovascular:      Rate and Rhythm: Normal rate and regular rhythm  Heart sounds: Normal heart sounds  Pulmonary:      Effort: Pulmonary effort is normal  No respiratory distress  Breath sounds: No wheezing, rhonchi or rales  Musculoskeletal:         General: No swelling  Cervical back: Normal range of motion and neck supple  Skin:     General: Skin is warm and dry     Neurological:      General: No focal deficit present  Mental Status: She is alert  Mental status is at baseline  Psychiatric:         Mood and Affect: Mood normal          Behavior: Behavior normal          Labs: I have personally reviewed pertinent lab results  , ABG: No results found for: PHART, VZH6MCA, PO2ART, ISH2SWM, D5TFTCGV, BEART, SOURCE, BNP: No results found for: BNP, CBC: No results found for: WBC, HGB, HCT, MCV, PLT, ADJUSTEDWBC, MCH, MCHC, RDW, MPV, NRBC, CMP: No results found for: SODIUM, K, CL, CO2, ANIONGAP, BUN, CREATININE, GLUCOSE, CALCIUM, AST, ALT, ALKPHOS, PROT, BILITOT, EGFR, PT/INR: No results found for: PT, INR, Troponin: No results found for: TROPONINI  Lab Results   Component Value Date    WBC 7 35 03/29/2022    HGB 15 3 03/29/2022    HCT 44 8 03/29/2022    MCV 93 03/29/2022     03/29/2022     Lab Results   Component Value Date    GLUCOSE 100 03/02/2015    CALCIUM 9 2 03/29/2022     03/02/2015    K 3 8 03/29/2022    CO2 28 03/29/2022     03/29/2022    BUN 22 03/29/2022    CREATININE 1 42 (H) 03/29/2022     No results found for: IGE  Lab Results   Component Value Date    ALT 31 08/06/2018    AST 19 08/06/2018    ALKPHOS 55 08/06/2018    BILITOT 0 7 03/02/2015       Imaging and other studies: I have personally reviewed pertinent reports  and I have personally reviewed pertinent films in PACS     Lung cancer screening CT 06/07/2022  No suspicious nodules  Mild emphysema noted  Benign antral pulmonary lymph node abutting the fissures  Benign calcified granulomas noted  Paraspinal scar in right lower lobe  Benign thin-walled cyst in left lower lobe  Enlarged pulmonary artery noted  Moderate coronary artery calcification  Pulmonary function testing:  Performed 6/7/22   FEV1/FVC ratio 76%   FEV1 88% predicted  FVC 90% predicted  no response to bronchodilators   % predicted   % predicted  DLCO corrected for hemoglobin 93 % predicted  Spirometry shows no obstructive airflow defect    No significant response to bronchodilators  Normal TLC with increased RV indicative of mild air trapping  Normal diffusion capacity  Flow volume loop normal     6 minutes walk 6/7/22  Baseline SpO2 97% on room air  Patient ambulated for 320 m in 6 minutes without stop or assistance  Lowest SpO2 92% on room air with max heart rate 99 beats per minute  Impression:  Patient does not require supplemental oxygen at rest or with activities

## 2022-08-22 NOTE — ASSESSMENT & PLAN NOTE
Remains committed to abstinence from nicotine and tobacco   Reviewed results of lung cancer screening CT she had in June  No new or suspicious pulmonary nodules  Will continue annual screening next due June 2023

## 2022-08-22 NOTE — ASSESSMENT & PLAN NOTE
Reviewed pulmonary function tests that Pop Lan had prior to this visit  No fixed obstruction but she does have evidence of mild air trapping which could be consistent with her asthma  I stressed the importance of compliance to her maintenance inhaler  I recommended she use Advair /21 mcg 2 puffs twice daily consistently  She was reminded to rinse her mouth out after each use  Also represcribed albuterol HFA 2 puffs q 6 hours p r n     She knows that she may have had some tachycardia with this medication but also notes that it was around the same time that she had atrial fibrillation and now that is well controlled  She is willing to try albuterol again but knows to call our office if tachycardia resumes  In the event her symptoms still remain uncontrolled will increase ics dose in her Advair inhaler  Prescribed prednisone 40 mg po daily x5 days to start now in the hopes it will help get us back on track

## 2022-11-28 ENCOUNTER — EVALUATION (OUTPATIENT)
Dept: PHYSICAL THERAPY | Facility: CLINIC | Age: 57
End: 2022-11-28

## 2022-11-28 DIAGNOSIS — Z96.652 STATUS POST TOTAL KNEE REPLACEMENT, LEFT: Primary | ICD-10-CM

## 2022-11-28 DIAGNOSIS — M25.562 ACUTE PAIN OF LEFT KNEE: ICD-10-CM

## 2022-11-28 NOTE — LETTER
2022    Nolan LunsfordBill [address] [address]    Patient: Shamir Duggan   YOB: 1965   Date of Visit: 2022     Encounter Diagnosis     ICD-10-CM    1  Status post total knee replacement, left  Z96 652       2  Acute pain of left knee  M25 562           Dear Dr Devora Barr: Thank you for your recent referral of Shamir Duggan  Please review the attached evaluation summary from Stephanie's recent visit  Please verify that you agree with the plan of care by signing the attached order  If you have any questions or concerns, please do not hesitate to call  I sincerely appreciate the opportunity to share in the care of one of your patients and hope to have another opportunity to work with you in the near future  Sincerely,    Ranjit Clemente, PT      Referring Provider:      I certify that I have read the below Plan of Care and certify the need for these services furnished under this plan of treatment while under my care  Nolan Lunsford MD  [address] [place] [id number]  Via Fax: [phone]          PT Evaluation     Today's date: 2022  Patient name: Shamir Duggan  : 1965  MRN: [id number]  Referring provider: Harpreet Lerma MD  Dx:   Encounter Diagnosis     ICD-10-CM    1  Status post total knee replacement, left  Z96 [id number]       2  Acute pain of left knee  M25 562           Start Time: 1055  Stop Time: 1200  Total time in clinic (min): 65 minutes    Assessment  Assessment details: Patient is a 60 yo female presenting s/p left TKA on   Ambulating fair post-op c/ SPC, slight forward flexion, step to gait pattern c/ minimal TKE, left knee flexed  Quad set fair c/ minimal superior patellar glide requiring verbal cues to decrease compensation c/ glute  Flexion to 75-80 active assisted this visit, sig pain, patient limiting further stretch   Extension and flexion painful for patient, slight improvement c/ stretching  Trialed moderate extension c/ pillow under calf during cold pack, patient c/ difficulty maintaining extension t/o time allotted due to pain  Educated patient on importance of regaining extension and flexion mobility for function  Discussed DVT detection and prevention, wound monitoring and assessment, positioning post-op and mobility techniques c/ RW and using stairs  Patient verbalized understanding and was able to teach back and/or demonstrate each piece of education provided  Provided patient c/ handout c/ HEP listing post-op exercises  Patient demonstrated each exercise c/ good form and verbalized understanding of expectations c/ HEP  Impairments: abnormal gait, abnormal or restricted ROM, abnormal movement, activity intolerance, impaired balance, impaired physical strength, pain with function and weight-bearing intolerance    Goals  ST-3 weeks  Good quad contraction and independent SLR to allow progression to [facility]  Full knee ext to 90 deg flex actively  Independent with swelling control and basic HEP for mobility/quad  LT-8 weeks  Knee ROM: 0-120 deg  Pain 3/10 or less with ADLs  Ambulate without AD normally and with minimal pain  SLS: 10 sec  or greater on surgical side  Knee ext and flex strength of 4/5 or greater  Independent with strengthening program    Complete stairs reciprocally  Plan  Plan details: TE, NMR, TA, MT, self-care, and modalities as needed in order to progress through skilled PT focused on ROM, strength, balance, motor control     Patient would benefit from: skilled physical therapy  Planned modality interventions: cryotherapy and thermotherapy: hydrocollator packs  Planned therapy interventions: manual therapy, neuromuscular re-education, patient education, self care, therapeutic activities, therapeutic exercise, balance, gait training and home exercise program  Frequency: 3x week  Duration in weeks: 8  Treatment plan discussed with: patient        Subjective Evaluation    History of Present Illness  Mechanism of injury: Patient is a 58 yo female presenting following L TKA on   Was given HEP to complete throughout the day (every hour) and ice until follow-up c/ doctor on   Reports compliance, doctor pleased c/ flexion, but would like patient to get more extension  Was given scrip for outpatient PT at follow-up  Next ortho appointment 23  Sleeping has been better on the recliner  Has been using SPC in community and outside her house  At home does not use AD  Patients primary goals for PT are to decrease pain, improve function and improve gait mechanics  Pain  Current pain ratin  At best pain ratin  At worst pain rating: 10  Quality: dull ache and sharp  Relieving factors: ice  Progression: improved    Social Support  Steps to enter house: yes (2 HASEEB no railings)  Stairs in house: yes (FF (Approx  14 steps)  Bathroom on second floor  Has been using bedside commode in laundry room on first floor for now  )   Lives in: multiple-level home    Patient Goals  Patient goals for therapy: decreased pain, increased motion, improved balance, increased strength and return to sport/leisure activities          Objective  Observation:     -Gait: Ambulates c/ SPC left knee flexed throughout, forward flexion of L/S     -Quad Set: fair, minimal superior patellar mobility     -Functional Mobility:        -Sit to Stand: Ind c/ b/l UE support        -Supine<>Sit:  Ind      -Incision: Closed, no sign of infection, clear steri-strip bandage    TTP: left knee joint line medial joint    Hip Strength (MMT Grades):  FLX (Supine, R/L): 3+ / 3-  ABD (Side-lying, R/L): 3+ / 3-    Knee ROM (degrees):  R: 3 fingers- 120  L: -25 -75    Knee Strength (MMT Grades):  EXT (R/L): 4+ / 3-  FLX (R/L): 4- / 3-          Precautions: L TKA 22      Date        Manuals        Knee PROM KS FLX Seated EOT, Ext       Patellar Mobility                Neuro Re-Ed        SLS        Sidestepping        Quad Set 3"x10       Step-ups: Fwd        TRX/Wall Squats        Sit to [employer]                Ther Ex        Gastroc Stretch 30"x4 strap       Hamstring Stretch        Heel Slides 5"x20       LAQ        SAQ 3x5       SLR x5       TKE        Seated Flexion 5"x10       Step Flexion        HR/TR        Stand: Abd/Ext        Stand: March/HC        Leg Press        Piriformis Stretch        NuStep L1 10'               Ther Activity                                Gait Training        Ambulation c/ RW        Stairs                Modalities        CP 10' post-tx

## 2022-11-28 NOTE — PROGRESS NOTES
PT Evaluation     Today's date: 2022  Patient name: Nereida Robert  : 1965  MRN: [id number]  Referring provider: Demetrio Saunders MD  Dx:   Encounter Diagnosis     ICD-10-CM    1  Status post total knee replacement, left  Z96 652       2  Acute pain of left knee  M25 562           Start Time: 1055  Stop Time: 1200  Total time in clinic (min): 65 minutes    Assessment  Assessment details: Patient is a 60 yo female presenting s/p left TKA on   Ambulating fair post-op c/ SPC, slight forward flexion, step to gait pattern c/ minimal TKE, left knee flexed  Quad set fair c/ minimal superior patellar glide requiring verbal cues to decrease compensation c/ glute  Flexion to 75-80 active assisted this visit, sig pain, patient limiting further stretch  Extension and flexion painful for patient, slight improvement c/ stretching  Trialed moderate extension c/ pillow under calf during cold pack, patient c/ difficulty maintaining extension t/o time allotted due to pain  Educated patient on importance of regaining extension and flexion mobility for function  Discussed DVT detection and prevention, wound monitoring and assessment, positioning post-op and mobility techniques c/ RW and using stairs  Patient verbalized understanding and was able to teach back and/or demonstrate each piece of education provided  Provided patient c/ handout c/ HEP listing post-op exercises  Patient demonstrated each exercise c/ good form and verbalized understanding of expectations c/ HEP  Impairments: abnormal gait, abnormal or restricted ROM, abnormal movement, activity intolerance, impaired balance, impaired physical strength, pain with function and weight-bearing intolerance    Goals  ST-3 weeks  Good quad contraction and independent SLR to allow progression to [facility]  Full knee ext to 90 deg flex actively  Independent with swelling control and basic HEP for mobility/quad  LT-8 weeks  Knee ROM: 0-120 deg    Pain 3/10 or less with ADLs  Ambulate without AD normally and with minimal pain  SLS: 10 sec  or greater on surgical side  Knee ext and flex strength of 4/5 or greater  Independent with strengthening program    Complete stairs reciprocally  Plan  Plan details: TE, NMR, TA, MT, self-care, and modalities as needed in order to progress through skilled PT focused on ROM, strength, balance, motor control  Patient would benefit from: skilled physical therapy  Planned modality interventions: cryotherapy and thermotherapy: hydrocollator packs  Planned therapy interventions: manual therapy, neuromuscular re-education, patient education, self care, therapeutic activities, therapeutic exercise, balance, gait training and home exercise program  Frequency: 3x week  Duration in weeks: 8  Treatment plan discussed with: patient        Subjective Evaluation    History of Present Illness  Mechanism of injury: Patient is a 58 yo female presenting following L TKA on   Was given HEP to complete throughout the day (every hour) and ice until follow-up c/ doctor on   Reports compliance, doctor pleased c/ flexion, but would like patient to get more extension  Was given scrip for outpatient PT at follow-up  Next ortho appointment 23  Sleeping has been better on the recliner  Has been using SPC in community and outside her house  At home does not use AD  Patients primary goals for PT are to decrease pain, improve function and improve gait mechanics  Pain  Current pain ratin  At best pain ratin  At worst pain rating: 10  Quality: dull ache and sharp  Relieving factors: ice  Progression: improved    Social Support  Steps to enter house: yes (2 HASEEB no railings)  Stairs in house: yes (FF (Approx  14 steps)  Bathroom on second floor   Has been using bedside commode in laundry room on first floor for now  )   Lives in: multiple-level home    Patient Goals  Patient goals for therapy: decreased pain, increased motion, improved balance, increased strength and return to sport/leisure activities          Objective  Observation:     -Gait: Ambulates c/ SPC left knee flexed throughout, forward flexion of L/S     -Quad Set: fair, minimal superior patellar mobility     -Functional Mobility:        -Sit to Stand: Ind c/ b/l UE support        -Supine<>Sit:  Ind      -Incision: Closed, no sign of infection, clear steri-strip bandage     -Celians (-)    TTP: left knee joint line medial joint    Hip Strength (MMT Grades):  FLX (Supine, R/L): 3+ / 3-  ABD (Side-lying, R/L): 3+ / 3-    Knee ROM (degrees):  R: 3 fingers- 120  L: -25 -75    Knee Strength (MMT Grades):  EXT (R/L): 4+ / 3-  FLX (R/L): 4- / 3-           Precautions: L TKA 11/14/22      Date 11/28       Manuals        Knee PROM KS FLX Seated EOT, Ext       Patellar Mobility                Neuro Re-Ed        SLS        Sidestepping        Quad Set 3"x10       Step-ups: Fwd        TRX/Wall Squats        Sit to [employer]                Ther Ex        Gastroc Stretch 30"x4 strap       Hamstring Stretch        Heel Slides 5"x20       LAQ        SAQ 3x5       SLR x5       TKE        Seated Flexion 5"x10       Step Flexion        HR/TR        Stand: Abd/Ext        Stand: March/HC        Leg Press        Piriformis Stretch        NuStep L1 10'               Ther Activity                                Gait Training        Ambulation c/ RW        Stairs                Modalities        CP 10' post-tx

## 2022-11-29 ENCOUNTER — TELEPHONE (OUTPATIENT)
Dept: PULMONOLOGY | Facility: CLINIC | Age: 57
End: 2022-11-29

## 2022-11-30 ENCOUNTER — OFFICE VISIT (OUTPATIENT)
Dept: PHYSICAL THERAPY | Facility: CLINIC | Age: 57
End: 2022-11-30

## 2022-11-30 DIAGNOSIS — M25.562 ACUTE PAIN OF LEFT KNEE: ICD-10-CM

## 2022-11-30 DIAGNOSIS — Z96.652 STATUS POST TOTAL KNEE REPLACEMENT, LEFT: Primary | ICD-10-CM

## 2022-11-30 NOTE — PROGRESS NOTES
Daily Note     Today's date: 2022  Patient name: Gelacio Lutz  : 1965  MRN: [id number]  Referring provider: Rajinder Basilio MD  Dx:   Encounter Diagnosis     ICD-10-CM    1  Status post total knee replacement, left  Z96 652       2  Acute pain of left knee  M25 562                      Subjective: Patient reports she feels her L knee ROM has improved since her evaluation  She feels her ambulation is improving and has not used her cane  She was compliant with her seated flexion stretch  Sees MD   Does not go up/down steps reciprocating unless on accident  Objective: See treatment diary below  Initiated full program post eval today  Measured L knee ext with bolster under ankle and patient performing quad set w/ OP at -5*  Patient's flexion is approx 85 * in seated position  Assessment: Tolerated treatment well  Patient would benefit from continued PT to improve L knee ROM, strength and stability for functionality  Patient compensated, more habit based, to perform step ups on 4" step  Did well w/o compensation to perform sequence after cues  Patient was able to achieve LAQ/SAQ until her ROM allowed and required Saud to achieve fill motion  Achieved SLR  Will progress as able and to tolerance  Continue to achieve 5* a week in flexion and ext until goal met  Plan: Continue per plan of care        Precautions: L TKA 22      Date       Manuals        Knee PROM KS FLX Seated EOT, Ext FLX SeatedEOT, Ext w/OP      Patellar Mobility  CM              Neuro Re-Ed        SLS        Sidestepping        Quad Set 3"x10 3"x10      Step-ups: Fwd  4" 2x10      TRX/Wall Squats        Sit to [employer]                Ther Ex        Gastroc Stretch 30"x4 strap Wall 30"x3      Hamstring Stretch  Step 30"x4      Heel Slides 5"x20 nv      LAQ  3x5 Saud TKE      SAQ 3x5 3x5 Saud TKE      SLR x5 2x5 CGA for Lag      TKE  NV      Seated Flexion 5"x10 Seated 5"x10 OP      Step Flexion  30"x3 HR/TR  2x10       Stand: Abd/Ext        Stand: March/HC        Leg Press        Piriformis Stretch        NuStep L1 10' L1 10'              Ther Activity                                Gait Training        Ambulation c/ RW        Stairs                Modalities        CP 10' post-tx 10'

## 2022-12-05 DIAGNOSIS — J45.20 INTERMITTENT ASTHMA, UNSPECIFIED ASTHMA SEVERITY, UNSPECIFIED WHETHER COMPLICATED: ICD-10-CM

## 2022-12-05 RX ORDER — MONTELUKAST SODIUM 10 MG/1
TABLET ORAL
Qty: 30 TABLET | Refills: 0 | Status: SHIPPED | OUTPATIENT
Start: 2022-12-05

## 2022-12-06 ENCOUNTER — OFFICE VISIT (OUTPATIENT)
Dept: PHYSICAL THERAPY | Facility: CLINIC | Age: 57
End: 2022-12-06

## 2022-12-06 DIAGNOSIS — M25.562 ACUTE PAIN OF LEFT KNEE: ICD-10-CM

## 2022-12-06 DIAGNOSIS — Z96.652 STATUS POST TOTAL KNEE REPLACEMENT, LEFT: Primary | ICD-10-CM

## 2022-12-06 NOTE — PROGRESS NOTES
Daily Note     Today's date: 2022  Patient name: Radha Conner  : 1965  MRN: [id number]  Referring provider: Alejandra Story MD  Dx:   Encounter Diagnosis     ICD-10-CM    1  Status post total knee replacement, left  Z96 652       2  Acute pain of left knee  M25 562                      Subjective: Patient reports some stiffness, may be from weather  Her L knee has some medial edema and discomfort  Objective: See treatment diary below  Flexion 85, ext not measured today  Incorporated TKE and step downs into program today  Assessment: Tolerated treatment well  Patient would benefit from continued PT to improve ROM and strength for functionality  Patient has limited ROM in F/Ext, updated HEP and will continue to assess this  Monitor quad strength  Plan: Continue per plan of care        Precautions: L TKA 22      Date      Manuals        Knee PROM KS FLX Seated EOT, Ext FLX SeatedEOT, Ext w/OP FLX SeatedEOT, Ext w/OP-CM     Patellar Mobility  CM              Neuro Re-Ed        SLS        Sidestepping        Quad Set 3"x10 3"x10 5"X10     Step-ups: Fwd  4" 2x10 6" 2x10     Step Downs   6" 2x10     Sit to Stands                Ther Ex        Gastroc Stretch 30"x4 strap Wall 30"x3 Wall 30"x3     Hamstring Stretch  Step 30"x4 Step 30"x3     Heel Slides 5"x20 nv      LAQ  3x5 Saud TKE 3x5 Saud TKE     SAQ 3x5 3x5 Saud TKE 3x5 Saud TKE     SLR x5 2x5 CGA for Lag 2x5 CGA for lag and cues for quad set     TKE  NV L2 3x10     Seated Flexion 5"x10 Seated 5"x10 OP Seated 5"x10 OP     Step Flexion  30"x3 30"3     HR/TR  2x10  nv     Stand: Abd/Ext        Stand: March/   nv     Leg Press        Piriformis Stretch        NuStep L1 10' L1 10' L4 10'             Ther Activity                                Gait Training        Ambulation c/ RW        Stairs                Modalities        CP 10' post-tx 10'

## 2022-12-09 ENCOUNTER — OFFICE VISIT (OUTPATIENT)
Dept: PHYSICAL THERAPY | Facility: CLINIC | Age: 57
End: 2022-12-09

## 2022-12-09 DIAGNOSIS — M25.562 ACUTE PAIN OF LEFT KNEE: ICD-10-CM

## 2022-12-09 DIAGNOSIS — Z96.652 STATUS POST TOTAL KNEE REPLACEMENT, LEFT: Primary | ICD-10-CM

## 2022-12-09 NOTE — PROGRESS NOTES
Daily Note     Today's date: 2022  Patient name: Jazzy Duggan  : 1965  MRN: [id number]  Referring provider: Salima Eaton MD  Dx:   Encounter Diagnosis     ICD-10-CM    1  Status post total knee replacement, left  Z96 652       2  Acute pain of left knee  M25 562                      Subjective: Patient reports L knee has less pain since STM on medial side  She is compliant with HEP stretching  Objective: See treatment diary below  Incorporated hip strength due to L hip hiking  Assessment: Tolerated treatment well  Patient would benefit from continued PT to improve ROM and strength  Extension can be met with OP post stretching  L knee flexion is 90 degrees, incorporated STM and MHP into flexion stretching  Plan: Continue per plan of care        Precautions: L TKA 22      Date     Manuals        Knee PROM KS FLX Seated EOT, Ext FLX SeatedEOT, Ext w/OP FLX SeatedEOT, Ext w/OP-CM FLX SeatedEOT, Ext w/OP-CM    Patellar Mobility  CM              Neuro Re-Ed        SLS    10"x5    Sidestepping        Quad Set 3"x10 3"x10 5"X10     Step-ups: Fwd  4" 2x10 6" 2x10 8" step overs 2x10    Step Downs   6" 2x10     Sit to Stands                Ther Ex        Gastroc Stretch 30"x4 strap Wall 30"x3 Wall 30"x3 Wall 30"x3    Hamstring Stretch  Step 30"x4 Step 30"x3 Step 30"x3    Heel Slides 5"x20 nv      LAQ  3x5 Saud TKE 3x5 Saud TKE 3x5 Saud TKE    SAQ 3x5 3x5 Saud TKE 3x5 Saud TKE NV    SLR x5 2x5 CGA for Lag 2x5 CGA for lag and cues for quad set NV    TKE  NV L2 3x10 L3 3x10    Seated Flexion 5"x10 Seated 5"x10 OP Seated 5"x10 OP Seated 5"x10 OP MHP    Step Flexion  30"x3 30"3 30"x3    HR/TR  2x10  nv 2x10    Stand: Abd/Ext    Abd 2x10    Stand: March/   nv     Leg Press        Piriformis Stretch        NuStep L1 10' L1 10' L4 10' L5 10'            Ther Activity                                Gait Training        Ambulation c/ RW        Stairs                Modalities CP 10' post-tx 10'

## 2022-12-13 ENCOUNTER — APPOINTMENT (OUTPATIENT)
Dept: PHYSICAL THERAPY | Facility: CLINIC | Age: 57
End: 2022-12-13

## 2022-12-14 ENCOUNTER — OFFICE VISIT (OUTPATIENT)
Dept: PHYSICAL THERAPY | Facility: CLINIC | Age: 57
End: 2022-12-14

## 2022-12-14 DIAGNOSIS — M25.562 ACUTE PAIN OF LEFT KNEE: ICD-10-CM

## 2022-12-14 DIAGNOSIS — Z96.652 STATUS POST TOTAL KNEE REPLACEMENT, LEFT: Primary | ICD-10-CM

## 2022-12-14 NOTE — PROGRESS NOTES
Daily Note     Today's date: 2022  Patient name: Maren Lara  : 1965  MRN: [id number]  Referring provider: Carmita Espinoza MD  Dx:   Encounter Diagnosis     ICD-10-CM    1  Status post total knee replacement, left  Z96 652       2  Acute pain of left knee  M25 562                      Subjective: Patient reports that she has a bad weekend and her L hip was sore  Objective: See treatment diary below  Flexion 91 today, MHP applied to L quad  Assessment: Tolerated treatment well  Patient would benefit from continued PT to improve L knee ROM  Limited flex/ext at this time  Next session will be MT focused including STM to posterior tendons/quad/scar to improve ROM  Plan: Continue per plan of care        Precautions: L TKA 22      Date    Manuals        Knee PROM KS FLX Seated EOT, Ext FLX SeatedEOT, Ext w/OP FLX SeatedEOT, Ext w/OP-CM FLX SeatedEOT, Ext w/OP-CM FLX SeatedEOT, Ext w/OP-CM   Patellar Mobility  CM              Neuro Re-Ed        SLS    10"x5 10"x5   Sidestepping        Quad Set 3"x10 3"x10 5"X10     Step-ups: Fwd  4" 2x10 6" 2x10 8" step overs 2x10 8" step overs 3x10   Step Downs   6" 2x10     Sit to [employer]                Ther Ex        Gastroc Stretch 30"x4 strap Wall 30"x3 Wall 30"x3 Wall 30"x3 Wall 30"x3   Hamstring Stretch  Step 30"x4 Step 30"x3 Step 30"x3 Step 30"x3   Heel Slides 5"x20 nv      LAQ  3x5 Saud TKE 3x5 Saud TKE 3x5 Saud TKE 3x5 active   SAQ 3x5 3x5 Saud TKE 3x5 Saud TKE NV    SLR x5 2x5 CGA for Lag 2x5 CGA for lag and cues for quad set NV    TKE  NV L2 3x10 L3 3x10 L3 3x10   Seated Flexion 5"x10 Seated 5"x10 OP Seated 5"x10 OP Seated 5"x10 OP MHP Seated 5"x10 OP MHP- 91   Step Flexion  30"x3 30"3 30"x3 30"x4    HR/TR  2x10  nv 2x10 2x10   Stand: Abd/Ext    Abd 2x10 supine nv   Leg Press     P1 2x10   HS curl machine     P4 3x10   NuStep L1 10' L1 10' L4 10' L5 10' L5 10' ROM focus           Ther Activity Gait Training        Ambulation c/ RW        Stairs                Modalities        CP 10' post-tx 10'

## 2022-12-16 ENCOUNTER — OFFICE VISIT (OUTPATIENT)
Dept: PHYSICAL THERAPY | Facility: CLINIC | Age: 57
End: 2022-12-16

## 2022-12-16 DIAGNOSIS — Z96.652 STATUS POST TOTAL KNEE REPLACEMENT, LEFT: Primary | ICD-10-CM

## 2022-12-16 DIAGNOSIS — M25.562 ACUTE PAIN OF LEFT KNEE: ICD-10-CM

## 2022-12-16 NOTE — PROGRESS NOTES
Daily Note     Today's date: 2022  Patient name: Deshaun Helm  : 1965  MRN: [id number]  Referring provider: Nick Jean MD  Dx:   Encounter Diagnosis     ICD-10-CM    1  Status post total knee replacement, left  Z96 652       2  Acute pain of left knee  M25 562                      Subjective: Patient reports that she had some medial L knee pain more, and may be from weather, and having a flare up  Objective: See treatment diary below  MT focus to obtain more ROM in L knee and modified due to arthritis pain  Assessment: Tolerated treatment well  Patient demonstrated fatigue post treatment and would benefit from continued PT to improve L knee ROM  Patient has adequate quad activation and she can ambulate w/o AD at this time  Focus on heel toe gait pattern to emphasis more ROM  STM prone to HS tendons and gastroc with ext focus  STM to quad seated on EOT during flexion stretch  Plan: Continue per plan of care        Precautions: L TKA 22      Date    Manuals        Knee PROM FLX Seated EOT, Ext w/OP w/  MHP during ROM- CM FLX SeatedEOT, Ext w/OP FLX SeatedEOT, Ext w/OP-CM FLX SeatedEOT, Ext w/OP-CM FLX SeatedEOT, Ext w/OP-CM   Patellar Mobility  CM      STM  HS tendons, gastroc, Quad- CM       Neuro Re-Ed        SLS    10"x5 10"x5   Sidestepping        Quad Set  3"x10 5"X10     Step-ups: Fwd  4" 2x10 6" 2x10 8" step overs 2x10 8" step overs 3x10   Step Downs   6" 2x10     Sit to Stands        Heel toe walk Emphasis on ROM       Ther Ex        Gastroc Stretch  Wall 30"x3 Wall 30"x3 Wall 30"x3 Wall 30"x3   Hamstring Stretch  Step 30"x4 Step 30"x3 Step 30"x3 Step 30"x3   Heel Slides  nv      LAQ  3x5 Saud TKE 3x5 Saud TKE 3x5 Saud TKE 3x5 active   SAQ  3x5 Saud TKE 3x5 Saud TKE NV    SLR  2x5 CGA for Lag 2x5 CGA for lag and cues for quad set NV    TKE  NV L2 3x10 L3 3x10 L3 3x10   Seated Flexion  Seated 5"x10 OP Seated 5"x10 OP Seated 5"x10 OP MHP Seated 5"x10 OP MHP- 91   Step Flexion  30"x3 30"3 30"x3 30"x4    HR/TR  2x10  nv 2x10 2x10   Stand: Abd/Ext    Abd 2x10 supine nv   Leg Press     P1 2x10   HS curl machine     P4 3x10   NuStep L5 10' ROM focus L1 10' L4 10' L5 10' L5 10' ROM focus           Ther Activity                                Gait Training        Ambulation c/ RW        Stairs                Modalities        CP  10'

## 2022-12-20 ENCOUNTER — OFFICE VISIT (OUTPATIENT)
Dept: PHYSICAL THERAPY | Facility: CLINIC | Age: 57
End: 2022-12-20

## 2022-12-20 DIAGNOSIS — M25.562 ACUTE PAIN OF LEFT KNEE: ICD-10-CM

## 2022-12-20 DIAGNOSIS — Z96.652 STATUS POST TOTAL KNEE REPLACEMENT, LEFT: Primary | ICD-10-CM

## 2022-12-20 NOTE — PROGRESS NOTES
Daily Note     Today's date: 2022  Patient name: Nona Murdock  : 1965  MRN: [id number]  Referring provider: Ihsan Beard MD  Dx:   Encounter Diagnosis     ICD-10-CM    1  Status post total knee replacement, left  Z96 652       2  Acute pain of left knee  M25 562           Start Time: 1100  Stop Time: 1200  Total time in clinic (min): 60 minutes    Subjective: Reports got her knee heating pad and vibration unit and used it this morning  Feels it helped with the pain  Objective: See treatment diary below  Knee FLX: AAROM c/ distraction 100*      Assessment: Tolerated treatment well  Patient demonstrated fatigue post treatment, exhibited good technique with therapeutic exercises and would benefit from continued PT  Focused primarily on improving knee flexion this visit  Utilized a variety of new techniques during seated flexion EOT to promote improved relaxation and decreased pain including heat and gentle tibiofemoral distraction  Improve knee flexion ROM to 100* this visit  Plan: Continue per plan of care        Precautions: L TKA 22      Date    Manuals        Knee PROM FLX Seated EOT, Ext w/OP w/  MHP during ROM- CM FLX Seated EOT MHP during ROM KS, EXT c/ OP FLX SeatedEOT, Ext w/OP-CM FLX SeatedEOT, Ext w/OP-CM FLX SeatedEOT, Ext w/OP-CM   Tibiofemoral Distraction  KS during Seated Knee FLX EOT      Patellar Mobility        STM  HS tendons, gastroc, Quad- CM nv      Neuro Re-Ed        SLS  10"x10 c/ fingertip support  10"x5 10"x5   Sidestepping        Quad Set        Step-ups: Fwd  8" step overs leading c/ LLE 2x10  8" step overs 2x10 8" step overs 3x10   Step Downs        Sit to [employer]        Heel toe walk Emphasis on ROM       Ther Ex        Gastroc Stretch    Wall 30"x3 Wall 30"x3   Hamstring Stretch    Step 30"x3 Step 30"x3   Heel Slides  5"x20 c/ strap for OP      LAQ    3x5 Saud TKE 3x5 active   SAQ    NV    SLR    NV    TKE  L3 3x10  L3 3x10 L3 3x10 Seated Flexion  Seated c/ OP&  TF distraction & MHP-100  Seated 5"x10 OP MHP Seated 5"x10 OP MHP- 91   Step Flexion  14" 5"x30  30"x3 30"x4    HR/TR    2x10 2x10   Stand: Abd/Ext    Abd 2x10 supine nv   Leg Press  nv   P1 2x10   HS curl machine  P4 3x10 P5  P4 3x10   NuStep L5 10' ROM focus L5 10' ROM Focus, Seat changes every 3 min S9-S7  L5 10' L5 10' ROM focus           Ther Activity                                Gait Training        Ambulation c/ RW        Stairs                Modalities        CP        MHP   10' c/ ROM

## 2022-12-23 ENCOUNTER — OFFICE VISIT (OUTPATIENT)
Dept: PHYSICAL THERAPY | Facility: CLINIC | Age: 57
End: 2022-12-23

## 2022-12-23 DIAGNOSIS — M25.562 ACUTE PAIN OF LEFT KNEE: ICD-10-CM

## 2022-12-23 DIAGNOSIS — Z96.652 STATUS POST TOTAL KNEE REPLACEMENT, LEFT: Primary | ICD-10-CM

## 2022-12-23 NOTE — PROGRESS NOTES
Daily Note     Today's date: 2022  Patient name: Betsy Mann  : 1965  MRN: [id number]  Referring provider: Rajesh Brady MD  Dx:   Encounter Diagnosis     ICD-10-CM    1  Status post total knee replacement, left  Z96 652       2  Acute pain of left knee  M25 562                      Subjective: Patient reports that she is using her heated knee brace and stretching  Patient has less medial knee pain  Objective: See treatment diary below  Assessment: Tolerated treatment well  Patient would benefit from continued PT to improve L knee ROM and stability for functionality  She is doing better for ROM tolerance  Will progress as able and to tolerance  Plan: Continue per plan of care        Precautions: L TKA 22      Date    Manuals        Knee PROM FLX Seated EOT, Ext w/OP w/  MHP during ROM- CM FLX Seated EOT MHP during ROM KS, EXT c/ OP FLX Seated EOT MHP during ROM KS, EXT c/ OP FLX SeatedEOT, Ext w/OP-CM FLX SeatedEOT, Ext w/OP-CM   Tibiofemoral Distraction  KS during Seated Knee FLX EOT KS during Seated Knee FLX EOT     Patellar Mobility        STM  HS tendons, gastroc, Quad- CM nv      Neuro Re-Ed        SLS  10"x10 c/ fingertip support 10"x10 c/ fingertip support 10"x5 10"x5   Sidestepping        Quad Set        Step-ups: Fwd  8" step overs leading c/ LLE 2x10 8" step overs leading c/ LLE 2x10 8" step overs 2x10 8" step overs 3x10   Step Downs        Sit to [employer]        Heel toe walk Emphasis on ROM       Ther Ex        Gastroc Stretch    Wall 30"x3 Wall 30"x3   Hamstring Stretch    Step 30"x3 Step 30"x3   Heel Slides  5"x20 c/ strap for OP 5"x20 c/ strap for OP     LAQ    3x5 Saud TKE 3x5 active   SAQ    NV    SLR    NV    TKE  L3 3x10 NV L3 3x10 L3 3x10   Seated Flexion  Seated c/ OP&  TF distraction & MHP-100  Seated 5"x10 OP MHP Seated 5"x10 OP MHP- 91   Step Flexion  14" 5"x30 14" 30"x5 30"x3 30"x4    HR/TR    2x10 2x10   Stand: Abd/Ext    Abd 2x10 supine nv   Leg Press  nv P2 3x10  P1 2x10   HS curl machine  P4 3x10 P4 3x10  P4 3x10   NuStep L5 10' ROM focus L5 10' ROM Focus, Seat changes every 3 min S9-S7 L5 10" ROM S9-7 L5 10' L5 10' ROM focus           Ther Activity                                Gait Training        Ambulation c/ RW        Stairs                Modalities        CP        MHP   10' c/ ROM

## 2022-12-27 ENCOUNTER — EVALUATION (OUTPATIENT)
Dept: PHYSICAL THERAPY | Facility: CLINIC | Age: 57
End: 2022-12-27

## 2022-12-27 DIAGNOSIS — Z96.652 STATUS POST TOTAL KNEE REPLACEMENT, LEFT: Primary | ICD-10-CM

## 2022-12-27 DIAGNOSIS — M25.562 ACUTE PAIN OF LEFT KNEE: ICD-10-CM

## 2022-12-27 NOTE — LETTER
2022    Jenifer AguilarBill [address] [place] [address]    Patient: Betsy Mann   YOB: 1965   Date of Visit: 2022     Encounter Diagnosis     ICD-10-CM    1  Status post total knee replacement, left  Z96 652       2  Acute pain of left knee  M25 562           Dear Dr Coleman Alt: Thank you for your recent referral of Betsy Mann  Please review the attached evaluation summary from Stephanie's recent visit  Please verify that you agree with the plan of care by signing the attached order  If you have any questions or concerns, please do not hesitate to call  I sincerely appreciate the opportunity to share in the care of one of your patients and hope to have another opportunity to work with you in the near future  Sincerely,    José Manuel Vazquez PT      Referring Provider:      I certify that I have read the below Plan of Care and certify the need for these services furnished under this plan of treatment while under my care  Jenifer Aguilar MD  [address] [place] [id number]  Via Fax: [phone]          PT Re-Evaluation     Today's date: 2022  Patient name: Betsy Mann  : 1965  MRN: [id number]  Referring provider: Rajesh Brady MD  Dx:   Encounter Diagnosis     ICD-10-CM    1  Status post total knee replacement, left  Z96 652       2  Acute pain of left knee  M25 562           Start Time: 1047  Stop Time: 1200  Total time in clinic (min): 73 minutes    Assessment  Assessment details: Patient is a 58 yo female presenting s/p left TKA on   Ambulating s/ AD, slight forward flexion and knee flexion  Functionally improving c/ regards to stair negotiation and sit to stands  Compensates when descending steps due to decreased knee flexion mobility  FLX to 104* AAROM this visit, patient knee flexion gradually improving, overall patient limiting further stretch   Extension improved since IE, but not fully flat at this point  Trialed low load long duration stretching this visit in supine c/ bolster under ankle to improve tolerance to passive stretch  Patient is progressing towards LTGs, but they are not met at this time  Overall, doing well post-op will continue to benefit from skilled PT interventions to address remaining impairments and functional limitations, will specific focus on continued ROM gain and LE strength  Impairments: abnormal gait, abnormal or restricted ROM, abnormal movement, activity intolerance, impaired balance, impaired physical strength, pain with function and weight-bearing intolerance    Goals  ST-3 weeks  Good quad contraction and independent SLR to allow progression to [facility]  Met  Full knee ext to 90 deg flex actively  Met  Independent with swelling control and basic HEP for mobility/quad  Met  LT-8 weeks  Progressing  Knee ROM: 0-120 deg  Pain 3/10 or less with ADLs  Ambulate without AD normally and with minimal pain  SLS: 10 sec  or greater on surgical side  Knee ext and flex strength of 4/5 or greater  Independent with strengthening program    Complete stairs reciprocally  Plan  Plan details: TE, NMR, TA, MT, self-care, and modalities as needed in order to progress through skilled PT focused on ROM, strength, balance, motor control  Patient would benefit from: skilled physical therapy  Planned modality interventions: cryotherapy and thermotherapy: hydrocollator packs  Planned therapy interventions: manual therapy, neuromuscular re-education, patient education, self care, therapeutic activities, therapeutic exercise, balance, gait training and home exercise program  Frequency: 2x week  Duration in weeks: 8  Treatment plan discussed with: patient        Subjective Evaluation    History of Present Illness  Mechanism of injury: UPDATE : Patient reports she is gradually feeling more like herself  Pain has decreased and localized mostly to region of medial knee   F/u c/ surgeon scheduled for 23  IE: Patient is a 60 yo female presenting following L TKA on   Was given HEP to complete throughout the day (every hour) and ice until follow-up c/ doctor on   Reports compliance, doctor pleased c/ flexion, but would like patient to get more extension  Was given scrip for outpatient PT at follow-up  Next ortho appointment 23  Sleeping has been better on the recliner  Has been using SPC in community and outside her house  At home does not use AD  Patients primary goals for PT are to decrease pain, improve function and improve gait mechanics  Pain  Current pain ratin  At best pain rating: 3  At worst pain ratin  Quality: dull ache and sharp  Relieving factors: ice  Progression: improved    Social Support  Steps to enter house: yes (2 HASEEB no railings)  Stairs in house: yes (FF (Approx  14 steps)  Bathroom on second floor  Has been using bedside commode in laundry room on first floor for now  )   Lives in: multiple-level home    Patient Goals  Patient goals for therapy: decreased pain, increased motion, improved balance, increased strength and return to sport/leisure activities          Objective  Observation:     -Gait: Ambulates s/ AD forward flexion of L/S, slight knee flx on surgical side      -Quad Set: good     -Functional Mobility:        -Sit to Stand: Ind c/ b/l UE support        -Supine<>Sit:  Ind      -Incision: Closed, no sign of infection     -Homans (-)    TTP: left knee joint line medial joint    Hip Strength (MMT Grades):  FLX (Supine, R/L): 3+ / 3-  ABD (Side-lying, R/L): 3+ / 3-    Knee ROM (degrees):  R: 3 fingers- 120  L:  6-104    Knee Strength (MMT Grades):  EXT (R/L): 4+ / 3+  FLX (R/L): 4- / 3+          Precautions: L TKA 22      Date     Manuals        Knee PROM FLX Seated EOT, Ext w/OP w/  MHP during ROM- CM FLX Seated EOT MHP during ROM KS, EXT c/ OP FLX Seated EOT MHP during ROM KS, EXT c/ OP FLX Seated EOT MHP during ROM KS, EXT c/ OP    Tibiofemoral Distraction  KS during Seated Knee FLX EOT KS during Seated Knee FLX EOT KS during Seated Knee FLX EOT    Patellar Mobility        STM  HS tendons, gastroc, Quad- CM nv  IASTM to medial quad KS    Assessment    KS    Neuro Re-Ed        SLS  10"x10 c/ fingertip support 10"x10 c/ fingertip support 10"x10 c/ fingertip support    Sidestepping        Quad Set        Step-ups: Fwd  8" step overs leading c/ LLE 2x10 8" step overs leading c/ LLE 2x10 8" step overs leading c/ LLE 2x10    Step Downs        Sit to [employer]    nv    Heel toe walk Emphasis on ROM       Ther Ex        Gastroc Stretch        Hamstring Stretch        Heel Slides  5"x20 c/ strap for OP 5"x20 c/ strap for OP 5"x20 c/ strap for OP    LAQ    P1 2x10    SLR        TKE  L3 3x10 NV P3 3x10    Seated Flexion  Seated c/ OP&  TF distraction & MHP-100  Manual    Step Flexion  14" 5"x30 14" 30"x5 14" 30"x5    HR/TR        Stand: Abd/Ext        Leg Press  nv P2 3x10 P3 3x10    HS curl machine  P4 3x10 P4 3x10 P4 3x10    NuStep L5 10' ROM focus L5 10' ROM Focus, Seat changes every 3 min S9-S7 L5 10" ROM S9-7 L5 10' S9-7 ROM Focus            Ther Activity                                Gait Training                Modalities        CP        MHP   10' c/ ROM  10' c/ ROM

## 2022-12-27 NOTE — PROGRESS NOTES
PT Re-Evaluation     Today's date: 2022  Patient name: Kayal Wade  : 1965  MRN: [id number]  Referring provider: Fiordaliza Garcia MD  Dx:   Encounter Diagnosis     ICD-10-CM    1  Status post total knee replacement, left  Z96 [id number]       2  Acute pain of left knee  M25 562           Start Time: 1047  Stop Time: 1200  Total time in clinic (min): 73 minutes    Assessment  Assessment details: Patient is a 60 yo female presenting s/p left TKA on   Ambulating s/ AD, slight forward flexion and knee flexion  Functionally improving c/ regards to stair negotiation and sit to stands  Compensates when descending steps due to decreased knee flexion mobility  FLX to 104* AAROM this visit, patient knee flexion gradually improving, overall patient limiting further stretch  Extension improved since IE, but not fully flat at this point  Trialed low load long duration stretching this visit in supine c/ bolster under ankle to improve tolerance to passive stretch  Patient is progressing towards LTGs, but they are not met at this time  Overall, doing well post-op will continue to benefit from skilled PT interventions to address remaining impairments and functional limitations, will specific focus on continued ROM gain and LE strength  Impairments: abnormal gait, abnormal or restricted ROM, abnormal movement, activity intolerance, impaired balance, impaired physical strength, pain with function and weight-bearing intolerance    Goals  ST-3 weeks  Good quad contraction and independent SLR to allow progression to [facility] [place]  Met  Full knee ext to 90 deg flex actively  Met  Independent with swelling control and basic HEP for mobility/quad  Met  LT-8 weeks  Progressing  Knee ROM: 0-120 deg  Pain 3/10 or less with ADLs  Ambulate without AD normally and with minimal pain  SLS: 10 sec  or greater on surgical side  Knee ext and flex strength of 4/5 or greater     Independent with strengthening program    Complete stairs reciprocally  Plan  Plan details: TE, NMR, TA, MT, self-care, and modalities as needed in order to progress through skilled PT focused on ROM, strength, balance, motor control  Patient would benefit from: skilled physical therapy  Planned modality interventions: cryotherapy and thermotherapy: hydrocollator packs  Planned therapy interventions: manual therapy, neuromuscular re-education, patient education, self care, therapeutic activities, therapeutic exercise, balance, gait training and home exercise program  Frequency: 2x week  Duration in weeks: 8  Treatment plan discussed with: patient        Subjective Evaluation    History of Present Illness  Mechanism of injury: UPDATE : Patient reports she is gradually feeling more like herself  Pain has decreased and localized mostly to region of medial knee  F/u c/ surgeon scheduled for 23  IE: Patient is a 58 yo female presenting following L TKA on   Was given HEP to complete throughout the day (every hour) and ice until follow-up c/ doctor on   Reports compliance, doctor pleased c/ flexion, but would like patient to get more extension  Was given scrip for outpatient PT at follow-up  Next ortho appointment 23  Sleeping has been better on the recliner  Has been using SPC in community and outside her house  At home does not use AD  Patients primary goals for PT are to decrease pain, improve function and improve gait mechanics  Pain  Current pain ratin  At best pain rating: 3  At worst pain ratin  Quality: dull ache and sharp  Relieving factors: ice  Progression: improved    Social Support  Steps to enter house: yes (2 HASEEB no railings)  Stairs in house: yes (FF (Approx  14 steps)  Bathroom on second floor   Has been using bedside commode in laundry room on first floor for now  )   Lives in: multiple-level home    Patient Goals  Patient goals for therapy: decreased pain, increased motion, improved balance, increased strength and return to sport/leisure activities          Objective  Observation:     -Gait: Ambulates s/ AD forward flexion of L/S, slight knee flx on surgical side      -Quad Set: good     -Functional Mobility:        -Sit to Stand: Ind c/ b/l UE support        -Supine<>Sit:  Ind      -Incision: Closed, no sign of infection     -Homans (-)    TTP: left knee joint line medial joint    Hip Strength (MMT Grades):  FLX (Supine, R/L): 3+ / 3-  ABD (Side-lying, R/L): 3+ / 3-    Knee ROM (degrees):  R: 3 fingers- 120  L:  6-104    Knee Strength (MMT Grades):  EXT (R/L): 4+ / 3+  FLX (R/L): 4- / 3+           Precautions: L TKA 11/14/22      Date 12/16 12/20 12/23 12/27    Manuals        Knee PROM FLX Seated EOT, Ext w/OP w/  MHP during ROM- CM FLX Seated EOT MHP during ROM KS, EXT c/ OP FLX Seated EOT MHP during ROM KS, EXT c/ OP FLX Seated EOT MHP during ROM KS, EXT c/ OP    Tibiofemoral Distraction  KS during Seated Knee FLX EOT KS during Seated Knee FLX EOT KS during Seated Knee FLX EOT    Patellar Mobility        STM  HS tendons, gastroc, Quad- CM nv  IASTM to medial quad KS    Assessment    KS    Neuro Re-Ed        SLS  10"x10 c/ fingertip support 10"x10 c/ fingertip support 10"x10 c/ fingertip support    Sidestepping        Quad Set        Step-ups: Fwd  8" step overs leading c/ LLE 2x10 8" step overs leading c/ LLE 2x10 8" step overs leading c/ LLE 2x10    Step Downs        Sit to [employer]    nv    Heel toe walk Emphasis on ROM       Ther Ex        Gastroc Stretch        Hamstring Stretch        Heel Slides  5"x20 c/ strap for OP 5"x20 c/ strap for OP 5"x20 c/ strap for OP    LAQ    P1 2x10    SLR        TKE  L3 3x10 NV P3 3x10    Seated Flexion  Seated c/ OP&  TF distraction & MHP-100  Manual    Step Flexion  14" 5"x30 14" 30"x5 14" 30"x5    HR/TR        Stand: Abd/Ext        Leg Press  nv P2 3x10 P3 3x10    HS curl machine  P4 3x10 P4 3x10 P4 3x10    NuStep L5 10' ROM focus L5 10' ROM Focus, Seat changes every 3 min S9-S7 L5 10" ROM S9-7 L5 10' S9-7 ROM Focus            Ther Activity                                Gait Training                Modalities        CP        MHP   10' c/ ROM  10' c/ ROM

## 2022-12-29 DIAGNOSIS — J45.20 INTERMITTENT ASTHMA, UNSPECIFIED ASTHMA SEVERITY, UNSPECIFIED WHETHER COMPLICATED: ICD-10-CM

## 2022-12-30 ENCOUNTER — OFFICE VISIT (OUTPATIENT)
Dept: PHYSICAL THERAPY | Facility: CLINIC | Age: 57
End: 2022-12-30

## 2022-12-30 DIAGNOSIS — M25.562 ACUTE PAIN OF LEFT KNEE: ICD-10-CM

## 2022-12-30 DIAGNOSIS — Z96.652 STATUS POST TOTAL KNEE REPLACEMENT, LEFT: Primary | ICD-10-CM

## 2022-12-30 RX ORDER — MONTELUKAST SODIUM 10 MG/1
TABLET ORAL
Qty: 30 TABLET | Refills: 0 | Status: SHIPPED | OUTPATIENT
Start: 2022-12-30

## 2022-12-30 NOTE — PROGRESS NOTES
Daily Note     Today's date: 2022  Patient name: Verner Ends  : 1965  MRN: [id number]  Referring provider: Jeferson Mast MD  Dx:   Encounter Diagnosis     ICD-10-CM    1  Status post total knee replacement, left  Z96 652       2  Acute pain of left knee  M25 562                      Subjective: Patient reports that her L knee still has symptoms at night  She uses her heated knee brace which helps with ROM and is doing steps better  RTD   Objective: See treatment diary below  Incorporated side stepping and increased resistance on knee extension mchine and stretch today  L knee flexion 106 today  Assessment: Tolerated treatment well  Patient would benefit from continued PT to improve L knee ROM and strength  She is not compensating with steps as much  She is able to tolerate more stretching with distraction  Plan: Continue per plan of care        Precautions: L TKA 22      Date    Manuals        Knee PROM FLX Seated EOT, Ext w/OP w/  MHP during ROM- CM FLX Seated EOT MHP during ROM KS, EXT c/ OP FLX Seated EOT MHP during ROM KS, EXT c/ OP FLX Seated EOT MHP during ROM KS, EXT c/ OP FLX Seated EOT MHP during ROM, EXT c/ OP   Tibiofemoral Distraction  KS during Seated Knee FLX EOT KS during Seated Knee FLX EOT KS during Seated Knee FLX EOT KS during Seated Knee FLX EOT   Patellar Mobility        STM  HS tendons, gastroc, Quad- CM nv  IASTM to medial quad KS NV   Assessment    KS    Neuro Re-Ed        SLS  10"x10 c/ fingertip support 10"x10 c/ fingertip support 10"x10 c/ fingertip support 10"x10 c/ fingertip support   Step-ups: Fwd  8" step overs leading c/ LLE 2x10 8" step overs leading c/ LLE 2x10 8" step overs leading c/ LLE 2x10 8" step overs leading c/ LLE 2x10   Step Downs        Sit to [employer]    Heel toe walk Emphasis on ROM       Side Stepping     L2 3x//   Ther Ex        Gastroc Stretch        Hamstring Stretch        Heel Slides  5"x20 c/ strap for OP 5"x20 c/ strap for OP 5"x20 c/ strap for OP 5"x20 c/ strap for OP   LAQ    P1 2x10 P2 3x10   SLR        TKE  L3 3x10 NV P3 3x10 P3 3x10   Seated Flexion  Seated c/ OP&  TF distraction & MHP-100  Manual Manual   Step Flexion  14" 5"x30 14" 30"x5 14" 30"x5 14" 30"x5   HR/TR        Stand: Abd/Ext        Leg Press  nv P2 3x10 P3 3x10 P3 3x10   HS curl machine  P4 3x10 P4 3x10 P4 3x10 P4 3x10   NuStep L5 10' ROM focus L5 10' ROM Focus, Seat changes every 3 min S9-S7 L5 10" ROM S9-7 L5 10' S9-7 ROM Focus L5 10' S9-7 ROM Focus   Gravity Ext stretch     8# 5' 9# 3'   Ther Activity                                Gait Training                Modalities        CP        MHP   10' c/ ROM  10' c/ ROM

## 2023-01-02 NOTE — PROGRESS NOTES
Daily Note     Today's date: 2023  Patient name: Esthela Guevara  : 1965  MRN: [id number]  Referring provider: Nori Webb MD  Dx: No diagnosis found  Subjective: ***      Objective: See treatment diary below      Assessment: Tolerated treatment {Tolerated treatment :[id number]}  Patient {assessment:[id number]}      Plan: Continue per plan of care        Precautions: L TKA 22      Date 1/3 12/20 12/23 12/27 12/30   Manuals        Knee PROM FLX Seated EOT MHP during ROM, EXT c/ OP KS FLX Seated EOT MHP during ROM KS, EXT c/ OP FLX Seated EOT MHP during ROM KS, EXT c/ OP FLX Seated EOT MHP during ROM KS, EXT c/ OP FLX Seated EOT MHP during ROM, EXT c/ OP   Tibiofemoral Distraction KS during Seated Knee FLX EOT KS during Seated Knee FLX EOT KS during Seated Knee FLX EOT KS during Seated Knee FLX EOT KS during Seated Knee FLX EOT   Patellar Mobility        STM   nv  IASTM to medial quad KS NV   Assessment    KS    Neuro Re-Ed        SLS  10"x10 c/ fingertip support 10"x10 c/ fingertip support 10"x10 c/ fingertip support 10"x10 c/ fingertip support   Step-ups: Fwd  8" step overs leading c/ LLE 2x10 8" step overs leading c/ LLE 2x10 8" step overs leading c/ LLE 2x10 8" step overs leading c/ LLE 2x10   Step Downs        Sit to [employer]    nv    Heel toe walk        Side Stepping     L2 3x//   Ther Ex        Gastroc Stretch        Hamstring Stretch        Heel Slides  5"x20 c/ strap for OP 5"x20 c/ strap for OP 5"x20 c/ strap for OP 5"x20 c/ strap for OP   LAQ    P1 2x10 P2 3x10   SLR        TKE  L3 3x10 NV P3 3x10 P3 3x10   Seated Flexion  Seated c/ OP&  TF distraction & MHP-100  Manual Manual   Step Flexion  14" 5"x30 14" 30"x5 14" 30"x5 14" 30"x5   HR/TR        Stand: Abd/Ext        Leg Press  nv P2 3x10 P3 3x10 P3 3x10   HS curl machine  P4 3x10 P4 3x10 P4 3x10 P4 3x10   NuStep  L5 10' ROM Focus, Seat changes every 3 min S9-S7 L5 10" ROM S9-7 L5 10' S9-7 ROM Focus L5 10' S9-7 ROM Focus Gravity Ext stretch     8# 5' 9# 3'   Ther Activity                                Gait Training                Modalities        CP        MHP   10' c/ ROM  10' c/ ROM

## 2023-01-03 ENCOUNTER — APPOINTMENT (OUTPATIENT)
Dept: PHYSICAL THERAPY | Facility: CLINIC | Age: 58
End: 2023-01-03

## 2023-01-05 ENCOUNTER — OFFICE VISIT (OUTPATIENT)
Dept: PHYSICAL THERAPY | Facility: CLINIC | Age: 58
End: 2023-01-05

## 2023-01-05 DIAGNOSIS — Z96.652 STATUS POST TOTAL KNEE REPLACEMENT, LEFT: Primary | ICD-10-CM

## 2023-01-05 DIAGNOSIS — M25.562 ACUTE PAIN OF LEFT KNEE: ICD-10-CM

## 2023-01-05 NOTE — PROGRESS NOTES
Daily Note     Today's date: 2023  Patient name: Amandeep Rodriguez  : 1965  MRN: [id number]  Referring provider: Gene Bobby MD  Dx:   Encounter Diagnosis     ICD-10-CM    1  Status post total knee replacement, left  Z96 652       2  Acute pain of left knee  M25 562                      Subjective: Patient reports her pain is minimal, she has a little edema in medial aspect  She RTD   Objective: See treatment diary below  Flexion measured at 105  Assessment: Tolerated treatment well  Patient would benefit from continued PT to improve L knee ROM and strength  Patient is doing better with steps and performing TE  Her strength has improved and shows good quad facilitation for stability  Plan: Continue per plan of care        Precautions: L TKA 22      Date    Manuals        Knee PROM FLX Seated EOT, Ext w/OP w/  MHP during ROM- CM FLX Seated EOT MHP during ROM KS, EXT c/ OP FLX Seated EOT MHP during ROM KS, EXT c/ OP FLX Seated EOT MHP during ROM KS, EXT c/ OP FLX Seated EOT MHP during ROM, EXT c/ OP   Tibiofemoral Distraction CM during Seated knee flex EOT KS during Seated Knee FLX EOT KS during Seated Knee FLX EOT KS during Seated Knee FLX EOT CM during Seated Knee FLX EOT   Patellar Mobility        STM   nv  IASTM to medial quad KS NV   Assessment    KS    Neuro Re-Ed        SLS 10"x10 c/ fingertip support 10"x10 c/ fingertip support 10"x10 c/ fingertip support 10"x10 c/ fingertip support 10"x10 c/ fingertip support   Step-ups: Fwd 8" step overs leading c/ LLE 2x10 8" step overs leading c/ LLE 2x10 8" step overs leading c/ LLE 2x10 8" step overs leading c/ LLE 2x10 8" step overs leading c/ LLE 2x10   Step Downs        Sit to [employer]    nv    Heel toe walk        Side Stepping L3 3x//    L2 3x//   Ther Ex        Gastroc Stretch        Hamstring Stretch        Heel Slides 5"x20 c/ strap for OP 5"x20 c/ strap for OP 5"x20 c/ strap for OP 5"x20 c/ strap for OP 5"x20 c/ strap for OP   LAQ P2 3x10   P1 2x10 P2 3x10   SLR        TKE P3 3x10 L3 3x10 NV P3 3x10 P3 3x10   Seated Flexion manual Seated c/ OP&  TF distraction & MHP-100  Manual Manual   Step Flexion 14" 30"x5 14" 5"x30 14" 30"x5 14" 30"x5 14" 30"x5   HR/TR        Stand: Abd/Ext        Leg Press P3 3x10 nv P2 3x10 P3 3x10 P3 3x10   HS curl machine P4 3x10 P4 3x10 P4 3x10 P4 3x10 P4 3x10   NuStep L5 10' S9-7 ROM Focus   L5 10' ROM Focus, Seat changes every 3 min S9-S7 L5 10" ROM S9-7 L5 10' S9-7 ROM Focus L5 10' S9-7 ROM Focus   Gravity Ext stretch  9# 3'   8# 5' 9# 3'   Ther Activity                                Gait Training                Modalities        CP        MHP   10' c/ ROM  10' c/ ROM

## 2023-01-06 ENCOUNTER — APPOINTMENT (OUTPATIENT)
Dept: PHYSICAL THERAPY | Facility: CLINIC | Age: 58
End: 2023-01-06

## 2023-01-09 NOTE — PROGRESS NOTES
PT Discharge    Today's date: 2023  Patient name: Griselda Sweeney  : 1965  MRN: [id number]  Referring provider: Geronimo Todd MD  Dx:   Encounter Diagnosis     ICD-10-CM    1  Status post total knee replacement, left  Z96 652       2  Acute pain of left knee  M25 562           Assessment  Assessment details: Patient is a 60 yo female presenting s/p left TKA on   Per patient, she was cleared to be discharged from PT by her doctor at her follow-up appointment on 23  PTA discussed c/ patient importance of continuing stretching and strengthening to get full ROM and function  Patient verbalized understanding  She is discharged from PT to independent management c/ HEP at this time  Impairments: abnormal gait, abnormal or restricted ROM, abnormal movement, activity intolerance, impaired balance, impaired physical strength, pain with function and weight-bearing intolerance    Goals  Patient did not present for subsequent re-evaluation, so unable to assess and address additional progress towards goals  ST-3 weeks  Good quad contraction and independent SLR to allow progression to [facility]  Met  Full knee ext to 90 deg flex actively  Met  Independent with swelling control and basic HEP for mobility/quad  Met  LT-8 weeks  Progressing  Knee ROM: 0-120 deg  Pain 3/10 or less with ADLs  Ambulate without AD normally and with minimal pain  SLS: 10 sec  or greater on surgical side  Knee ext and flex strength of 4/5 or greater  Independent with strengthening program    Complete stairs reciprocally  Plan  Plan details: D/c patient to independent management c/ HEP  Planned therapy interventions: home exercise program  Treatment plan discussed with: patient        Subjective Evaluation    History of Present Illness  Mechanism of injury: UPDATE : Per patient, she was cleared to be discharged from PT by her doctor at her follow-up appointment   She is also concerned about additional cost of PT due to the cost at the start of the new year  UPDATE : Patient reports she is gradually feeling more like herself  Pain has decreased and localized mostly to region of medial knee  F/u c/ surgeon scheduled for 23  IE: Patient is a 58 yo female presenting following L TKA on   Was given HEP to complete throughout the day (every hour) and ice until follow-up c/ doctor on   Reports compliance, doctor pleased c/ flexion, but would like patient to get more extension  Was given scrip for outpatient PT at follow-up  Next ortho appointment 23  Sleeping has been better on the recliner  Has been using SPC in community and outside her house  At home does not use AD  Patients primary goals for PT are to decrease pain, improve function and improve gait mechanics  Pain  Current pain ratin  At best pain rating: 3  At worst pain ratin  Quality: dull ache and sharp  Relieving factors: ice  Progression: improved    Social Support  Steps to enter house: yes (2 HASEEB no railings)  Stairs in house: yes (FF (Approx  14 steps)  Bathroom on second floor  Has been using bedside commode in laundry room on first floor for now  )   Lives in: multiple-level home    Patient Goals  Patient goals for therapy: decreased pain, increased motion, improved balance, increased strength and return to sport/leisure activities          Objective  Measurements below are from re-eval on 22  Patient did not present for subsequent re-evaluation, so unable to obtain new measurements  Observation:     -Gait: Ambulates s/ AD forward flexion of L/S, slight knee flx on surgical side      -Quad Set: good     -Functional Mobility:        -Sit to Stand: Ind c/ b/l UE support        -Supine<>Sit:  Ind      -Incision: Closed, no sign of infection     -Homans (-)    TTP: left knee joint line medial joint    Hip Strength (MMT Grades):  FLX (Supine, R/L): 3+ / 3-  ABD (Side-lying, R/L): 3+ / 3-    Knee ROM (degrees):  R: 3 fingers- 120  L:  6-104    Knee Strength (MMT Grades):  EXT (R/L): 4+ / 3+  FLX (R/L): 4- / 3+           Precautions: L TKA 11/14/22

## 2023-01-10 ENCOUNTER — APPOINTMENT (OUTPATIENT)
Dept: PHYSICAL THERAPY | Facility: CLINIC | Age: 58
End: 2023-01-10

## 2023-01-13 ENCOUNTER — APPOINTMENT (OUTPATIENT)
Dept: PHYSICAL THERAPY | Facility: CLINIC | Age: 58
End: 2023-01-13

## 2023-01-17 ENCOUNTER — APPOINTMENT (OUTPATIENT)
Dept: PHYSICAL THERAPY | Facility: CLINIC | Age: 58
End: 2023-01-17

## 2023-01-20 ENCOUNTER — APPOINTMENT (OUTPATIENT)
Dept: PHYSICAL THERAPY | Facility: CLINIC | Age: 58
End: 2023-01-20

## 2023-01-21 DIAGNOSIS — J45.20 INTERMITTENT ASTHMA, UNSPECIFIED ASTHMA SEVERITY, UNSPECIFIED WHETHER COMPLICATED: ICD-10-CM

## 2023-01-23 RX ORDER — MONTELUKAST SODIUM 10 MG/1
TABLET ORAL
Qty: 30 TABLET | Refills: 0 | Status: SHIPPED | OUTPATIENT
Start: 2023-01-23

## 2023-01-24 ENCOUNTER — APPOINTMENT (OUTPATIENT)
Dept: PHYSICAL THERAPY | Facility: CLINIC | Age: 58
End: 2023-01-24

## 2023-01-27 ENCOUNTER — APPOINTMENT (OUTPATIENT)
Dept: PHYSICAL THERAPY | Facility: CLINIC | Age: 58
End: 2023-01-27

## 2023-02-25 DIAGNOSIS — J45.20 INTERMITTENT ASTHMA, UNSPECIFIED ASTHMA SEVERITY, UNSPECIFIED WHETHER COMPLICATED: ICD-10-CM

## 2023-02-27 RX ORDER — MONTELUKAST SODIUM 10 MG/1
TABLET ORAL
Qty: 30 TABLET | Refills: 0 | Status: SHIPPED | OUTPATIENT
Start: 2023-02-27

## 2023-03-25 DIAGNOSIS — J45.20 INTERMITTENT ASTHMA, UNSPECIFIED ASTHMA SEVERITY, UNSPECIFIED WHETHER COMPLICATED: ICD-10-CM

## 2023-03-27 RX ORDER — MONTELUKAST SODIUM 10 MG/1
TABLET ORAL
Qty: 30 TABLET | Refills: 0 | Status: SHIPPED | OUTPATIENT
Start: 2023-03-27

## 2023-10-10 DIAGNOSIS — J45.20 INTERMITTENT ASTHMA, UNSPECIFIED ASTHMA SEVERITY, UNSPECIFIED WHETHER COMPLICATED: ICD-10-CM

## 2023-10-10 RX ORDER — MONTELUKAST SODIUM 10 MG/1
TABLET ORAL
Qty: 30 TABLET | Refills: 0 | Status: SHIPPED | OUTPATIENT
Start: 2023-10-10

## 2023-11-07 DIAGNOSIS — J45.20 INTERMITTENT ASTHMA, UNSPECIFIED ASTHMA SEVERITY, UNSPECIFIED WHETHER COMPLICATED: ICD-10-CM

## 2023-11-07 RX ORDER — MONTELUKAST SODIUM 10 MG/1
TABLET ORAL
Qty: 30 TABLET | Refills: 0 | Status: SHIPPED | OUTPATIENT
Start: 2023-11-07

## 2023-12-09 DIAGNOSIS — J45.20 INTERMITTENT ASTHMA, UNSPECIFIED ASTHMA SEVERITY, UNSPECIFIED WHETHER COMPLICATED: ICD-10-CM

## 2023-12-12 RX ORDER — MONTELUKAST SODIUM 10 MG/1
TABLET ORAL
Qty: 30 TABLET | Refills: 0 | Status: SHIPPED | OUTPATIENT
Start: 2023-12-12

## 2024-01-06 DIAGNOSIS — J45.20 INTERMITTENT ASTHMA, UNSPECIFIED ASTHMA SEVERITY, UNSPECIFIED WHETHER COMPLICATED: ICD-10-CM

## 2024-01-06 RX ORDER — MONTELUKAST SODIUM 10 MG/1
TABLET ORAL
Qty: 30 TABLET | Refills: 0 | Status: SHIPPED | OUTPATIENT
Start: 2024-01-06

## 2024-02-01 DIAGNOSIS — J45.20 INTERMITTENT ASTHMA, UNSPECIFIED ASTHMA SEVERITY, UNSPECIFIED WHETHER COMPLICATED: ICD-10-CM

## 2024-02-01 RX ORDER — MONTELUKAST SODIUM 10 MG/1
TABLET ORAL
Qty: 30 TABLET | Refills: 0 | Status: SHIPPED | OUTPATIENT
Start: 2024-02-01

## 2024-02-26 DIAGNOSIS — J45.20 INTERMITTENT ASTHMA, UNSPECIFIED ASTHMA SEVERITY, UNSPECIFIED WHETHER COMPLICATED: ICD-10-CM

## 2024-02-26 RX ORDER — MONTELUKAST SODIUM 10 MG/1
TABLET ORAL
Qty: 30 TABLET | Refills: 0 | Status: SHIPPED | OUTPATIENT
Start: 2024-02-26

## 2024-03-24 DIAGNOSIS — J45.20 INTERMITTENT ASTHMA, UNSPECIFIED ASTHMA SEVERITY, UNSPECIFIED WHETHER COMPLICATED: ICD-10-CM

## 2024-03-25 RX ORDER — MONTELUKAST SODIUM 10 MG/1
TABLET ORAL
Qty: 30 TABLET | Refills: 0 | Status: SHIPPED | OUTPATIENT
Start: 2024-03-25

## 2024-03-25 NOTE — TELEPHONE ENCOUNTER
Patient is way overdue for an appt. Please schedule her with a physician. This is the last time I will fill singulair until she is seen.

## 2024-04-01 DIAGNOSIS — J45.40 MODERATE PERSISTENT ASTHMA WITHOUT COMPLICATION: ICD-10-CM

## 2024-04-01 DIAGNOSIS — J43.2 CENTRILOBULAR EMPHYSEMA (HCC): Chronic | ICD-10-CM

## 2024-04-01 RX ORDER — ALBUTEROL SULFATE 90 UG/1
2 AEROSOL, METERED RESPIRATORY (INHALATION) EVERY 6 HOURS PRN
Qty: 18 G | Refills: 0 | Status: SHIPPED | OUTPATIENT
Start: 2024-04-01

## 2024-04-21 DIAGNOSIS — J45.20 INTERMITTENT ASTHMA, UNSPECIFIED ASTHMA SEVERITY, UNSPECIFIED WHETHER COMPLICATED: ICD-10-CM

## 2024-04-22 RX ORDER — MONTELUKAST SODIUM 10 MG/1
TABLET ORAL
Qty: 30 TABLET | Refills: 0 | Status: SHIPPED | OUTPATIENT
Start: 2024-04-22 | End: 2024-05-03 | Stop reason: SDUPTHER

## 2024-05-03 ENCOUNTER — OFFICE VISIT (OUTPATIENT)
Dept: PULMONOLOGY | Facility: CLINIC | Age: 59
End: 2024-05-03
Payer: COMMERCIAL

## 2024-05-03 VITALS
WEIGHT: 212 LBS | BODY MASS INDEX: 32.13 KG/M2 | DIASTOLIC BLOOD PRESSURE: 63 MMHG | HEIGHT: 68 IN | HEART RATE: 76 BPM | SYSTOLIC BLOOD PRESSURE: 92 MMHG | OXYGEN SATURATION: 97 % | TEMPERATURE: 98.2 F

## 2024-05-03 DIAGNOSIS — F17.211 CIGARETTE NICOTINE DEPENDENCE IN REMISSION: Primary | ICD-10-CM

## 2024-05-03 DIAGNOSIS — J43.2 CENTRILOBULAR EMPHYSEMA (HCC): ICD-10-CM

## 2024-05-03 DIAGNOSIS — J30.2 SEASONAL ALLERGIES: ICD-10-CM

## 2024-05-03 DIAGNOSIS — J45.20 MILD INTERMITTENT ASTHMA WITHOUT COMPLICATION: ICD-10-CM

## 2024-05-03 PROBLEM — K76.0 NAFLD (NONALCOHOLIC FATTY LIVER DISEASE): Status: ACTIVE | Noted: 2024-03-25

## 2024-05-03 PROBLEM — E11.65 UNCONTROLLED TYPE 2 DIABETES MELLITUS WITH HYPERGLYCEMIA (HCC): Status: ACTIVE | Noted: 2024-03-25

## 2024-05-03 PROCEDURE — 99214 OFFICE O/P EST MOD 30 MIN: CPT

## 2024-05-03 RX ORDER — SEMAGLUTIDE 0.68 MG/ML
0.25 INJECTION, SOLUTION SUBCUTANEOUS WEEKLY
COMMUNITY
Start: 2024-04-30

## 2024-05-03 RX ORDER — UPADACITINIB 15 MG/1
TABLET, EXTENDED RELEASE ORAL
COMMUNITY
Start: 2024-01-29

## 2024-05-03 RX ORDER — CETIRIZINE HYDROCHLORIDE 10 MG/1
10 TABLET, CHEWABLE ORAL DAILY
Qty: 30 TABLET | Refills: 3 | Status: SHIPPED | OUTPATIENT
Start: 2024-05-03

## 2024-05-03 RX ORDER — MONTELUKAST SODIUM 10 MG/1
10 TABLET ORAL
Qty: 30 TABLET | Refills: 0 | Status: SHIPPED | OUTPATIENT
Start: 2024-05-03

## 2024-05-03 NOTE — PROGRESS NOTES
Progress note - Pulmonary Medicine   Stephanie Justice 59 y.o. female MRN: [id number]       Impression & Plan:   Stephanie Justice is a 59 y.o. female with PMHx of HTN, CAD, PVD, HFpEF, Afib s/p ablation, emphysema, asthma, NAFLD, DM2, psoriatic arthritis, ADHD, anxiety, former tobacco use, HLD and obesity who presents for follow-up.    Mild Intermittent Asthma  - Patient has a history of mild intermittent asthma dating back to young adulthood and triggered by seasonal allergies.  She is currently using 2 different inhalers, but is not sure which ones although it seems it is less than once a week.  - Asked her to call the office with which inhaler she has when she gets home, per prior visits it appears this may be Advair  mcg and albuterol.  Recommend that she use the albuterol once or twice daily standing during spring/summer to help with allergic triggered asthma and then continue to use the albuterol as needed.  - Management of seasonal allergies as below  - Continue with montelukast.  - Follow up in 6 months.  -     montelukast (SINGULAIR) 10 mg tablet; Take 1 tablet (10 mg total) by mouth daily at bedtime    Centrilobular Emphysema  - Patient was noted to have mild centrilobular emphysema in the apices on prior LDCT.  Last PFTs in 2022 were without fixed obstruction.  - No indication for change in inhaler therapy at this time  - Patient has already stopped smoking, recommend continued cessation.    Cigarette Nicotine Dependence in Remission  - The patient is a roughly 40-year pack history and quit 8 years ago.  She will continue to qualify for yearly LDCT for the next 7 years, and at this time is overdue for repeat.  - Will order LDCT to be done now.  -     CT lung screening program; Future    Seasonal Allergies  - Patient has seasonal allergies which are made worse by dust exposures at her work.  She is currently on montelukast but is not taking any other oral antihistamines.  Will prescribe cetirizine 10 mg  daily.  -     cetirizine (ZyrTEC) 10 MG chewable tablet; Chew 1 tablet (10 mg total) daily    Return in about 6 months (around 11/3/2024).  ______________________________________________________________________    HPI:    Stephanie Justice is a 59 y.o. female with PMHx of HTN, CAD, PVD, HFpEF, Afib s/p ablation, emphysema, asthma, NAFLD, DM2, psoriatic arthritis, ADHD, anxiety, former tobacco use, HLD and obesity who presents for follow-up.  The patient was last seen in the office on 8/22/2022 at which time she was on Advair 115 mcg for asthma with as needed albuterol and recommendation was for repeat LDCT in 6/2023.  Currently the patient reports she is not using any standing inhalers, she does report taking inhalers but cannot identify which ones on the inhaler chart.  She notes there are 2 inhalers that she has at home and she uses them overall less than once a week.  She states she has no significant dyspnea at this time, but expects that as spring/summer progresses she will have some symptoms.  She also notes that dust at work ([employer]) will trigger her to feel more short of breath at times and to experience allergy symptoms.  She denies any wheeze or cough currently and thinks she could walk about 1/2 mile on flat ground or 2-3 flights of steps before needing to rest.  She is currently taking montelukast every day and reports it does give her some benefit in regards to her allergies.  She is not on any other allergy medications at this time.  She does not believe that she has received any courses of steroids for respiratory purposes since she was last seen.  She denies chest pain, palpitations, syncope, night sweats, unintentional weight loss, hemoptysis and cough.  She does have intermittent pedal edema usually at the end of the work day, but states it resolves with elevation of her legs.    She started smoking at age 12 and quit at age 51, she states her max was 1 pack/day.  She denies any vaping or smokeless  tobacco.    Current Medications:    Current Outpatient Medications:     cetirizine (ZyrTEC) 10 MG chewable tablet, Chew 1 tablet (10 mg total) daily, Disp: 30 tablet, Rfl: 3    montelukast (SINGULAIR) 10 mg tablet, Take 1 tablet (10 mg total) by mouth daily at bedtime, Disp: 30 tablet, Rfl: 0    Ozempic, 0.25 or 0.5 MG/DOSE, 2 MG/3ML injection pen, Inject 0.25 mg under the skin Once a week, Disp: , Rfl:     Rinvoq 15 MG TB24, TAKE 1 TABLET BY MOUTH 1 TIME A DAY., Disp: , Rfl:     acetaminophen (TYLENOL) 500 mg tablet, Take 500 mg by mouth every 6 (six) hours as needed, Disp: , Rfl:     albuterol (PROVENTIL HFA,VENTOLIN HFA) 90 mcg/act inhaler, INHALE 2 PUFFS BY MOUTH EVERY 6 HOURS AS NEEDED FOR WHEEZING, Disp: 18 g, Rfl: 0    amLODIPine (NORVASC) 2.5 mg tablet, Take 5 mg by mouth daily, Disp: , Rfl:     Aspirin (ASPIR-81 PO), every 24 hours, Disp: , Rfl:     diclofenac sodium (VOLTAREN) 1 %, Apply 2 g topically 4 (four) times a day, Disp: , Rfl:     Diclofenac Sodium (VOLTAREN) 1 %, Apply 2 g topically 3 (three) times a day as needed (Pain) (Patient not taking: No sig reported), Disp: 150 g, Rfl: 3    dofetilide (TIKOSYN) 250 mcg capsule, TAKE 1 CAPSULE BY MOUTH TWICE DAILY, Disp: , Rfl:     etanercept (ENBREL SURECLICK) 50 MG/ML injection, Inject 50 mg under the skin Once a week, Disp: , Rfl:     fluticasone-salmeterol (Advair HFA) 115-21 MCG/ACT inhaler, Inhale 2 puffs 2 (two) times a day Rinse mouth after use., Disp: 12 g, Rfl: 5    furosemide (LASIX) 40 mg tablet, Take 1 tablet by mouth see administration instructions 1 tablet on Mon, Wed, Fri 1/2 tablet on Tues, Thurs, Sat, Sun , Disp: , Rfl:     lisinopril (ZESTRIL) 5 mg tablet, TAKE 1 TABLET BY MOUTH ONCE DAILY PLEASE CALL [phone] FOR AUG APPOINTMENT, Disp: , Rfl:     Magnesium 200 MG TABS, Take 1 tablet by mouth daily, Disp: , Rfl:     metoprolol succinate (TOPROL-XL) 25 mg 24 hr tablet, Take 25 mg by mouth 2 (two) times a day , Disp: , Rfl:      "Multiple Vitamins-Minerals (MULTIVITAMIN ADULT PO), Take 2 tablets by mouth, Disp: , Rfl:     rosuvastatin (CRESTOR) 20 MG tablet, Take 20 mg by mouth, Disp: , Rfl:     XARELTO 20 MG tablet, , Disp: , Rfl:     Current Facility-Administered Medications:     levalbuterol (XOPENEX HFA) inhaler 1 puff, 1 puff, Inhalation, Q6H PRN, Curry Christianson MD    Review of Systems:  Review of Systems  10-point system review completed, all of which are negative except as mentioned above.    Past medical history, surgical history, and family history were reviewed and updated as appropriate    Social history updates:  Social History     Tobacco Use   Smoking Status Former    Current packs/day: 0.00    Average packs/day: 0.8 packs/day for 39.0 years (29.2 ttl pk-yrs)    Types: Cigarettes    Start date: 1977    Quit date: 2016    Years since quittin.3   Smokeless Tobacco Never   Tobacco Comments    no passive smoke exposure     PhysicalExamination:  Vitals:   BP 92/63 (BP Location: Left arm, Patient Position: Sitting, Cuff Size: Standard)   Pulse 76   Temp 98.2 °F (36.8 °C) (Temporal)   Ht 5' 8\" (1.727 m)   Wt 96.2 kg (212 lb)   SpO2 97%   BMI 32.23 kg/m²   Body mass index is 32.23 kg/m².    Constitutional: NAD, well appearing, on room air, no conversational dyspnea   Skin: Warm, dry, no rashes noted   Eyes: PERRL, normal conjunctiva  ENT: Nasal congestion absent, moist mucus membranes.  Neck: No JVD, trachea is midline, no adenopathy.  Resp: CTA B/L, no W/R/R   Cardiac: RRR, +S1/S2, no M/R/G  Abdomen: Soft, NT/ND  Extremities: No digital clubbing or pedal edema  Neuro: AAOx3    Diagnostic Data:  Labs:  I personally reviewed the most recent laboratory data pertinent to today's visit    Lab Results   Component Value Date    WBC 7.35 2022    HGB 15.3 2022    HCT 44.8 2022    MCV 93 2022     2022     Lab Results   Component Value Date    SODIUM 141 2023    K 4.1 2023    CO2 " "26 12/22/2023     12/22/2023    BUN 25 12/22/2023    CREATININE 0.94 12/22/2023    GLUCOSE 100 03/02/2015    CALCIUM 9.1 12/22/2023     PFT results:  The most recent pulmonary function tests were reviewed.  PFTs w/ BD -- 6/7/2022  FEV1/FVC Ratio:  76%, FEV1 2.7 L/88%, FVC 3.56 L/90%  Lung volumes by body plethysmography: %, %, DLCO 93%    Imaging:  I personally reviewed the images in PACS pertinent to today's visit:  LDCT -- 6/7/2022  No suspicious nodules.  Lung-RADS1, negative. Continue annual screening with LDCT in 12 months.   Pulmonary artery enlargement which can be seen with pulmonary hypertension.  Moderate coronary artery calcification indicating atherosclerotic heart disease.    Other studies:  Echo Complete -- 11/5/2021    Left Ventricle: Left ventricle is normal in size. Systolic function is   low normal with an ejection fraction of 50-55%. Basilar inferior, basilar   inferolateral hypokinesis There is grade I (mild) diastolic dysfunction.     Left Atrium: Left atrium volume index is moderately increased.     Right Ventricle: Right ventricle cavity is normal. Systolic function is   normal.    Mitral Valve: The leaflets are mildly thickened. Leaflet tips are   calcified. There is mild regurgitation with a posteriorly directed jet.     PA pressure: The estimated pulmonary artery systolic pressure is 32.1   mmHg. Normal pulmonary artery pressure.     I have spent a total time of 30 minutes on 05/12/24 in caring for this patient including Instructions for management, Patient and family education, Importance of tx compliance, Counseling / Coordination of care, Documenting in the medical record, Reviewing / ordering tests, medicine, procedures  , and Obtaining or reviewing history  .    Cira Brown MD  Pulmonary-Critical Care and Sleep Medicine  05/12/24    Portions of the record may have been created with voice recognition software. Occasional wrong word or \"sound a like\" substitutions " may have occurred due to the inherent limitations of voice recognition software. Please read the chart carefully and recognize, using context, where substitutions have occurred.

## 2024-05-12 DIAGNOSIS — J45.40 MODERATE PERSISTENT ASTHMA WITHOUT COMPLICATION: ICD-10-CM

## 2024-05-12 DIAGNOSIS — J43.2 CENTRILOBULAR EMPHYSEMA (HCC): Chronic | ICD-10-CM

## 2024-05-13 RX ORDER — ALBUTEROL SULFATE 90 UG/1
2 AEROSOL, METERED RESPIRATORY (INHALATION) EVERY 6 HOURS PRN
Qty: 18 G | Refills: 2 | Status: SHIPPED | OUTPATIENT
Start: 2024-05-13

## 2024-07-31 DIAGNOSIS — J45.20 MILD INTERMITTENT ASTHMA WITHOUT COMPLICATION: ICD-10-CM

## 2024-07-31 RX ORDER — MONTELUKAST SODIUM 10 MG/1
10 TABLET ORAL
Qty: 30 TABLET | Refills: 0 | Status: SHIPPED | OUTPATIENT
Start: 2024-07-31

## 2024-08-21 DIAGNOSIS — J45.20 MILD INTERMITTENT ASTHMA WITHOUT COMPLICATION: ICD-10-CM

## 2024-08-21 RX ORDER — MONTELUKAST SODIUM 10 MG/1
10 TABLET ORAL
Qty: 30 TABLET | Refills: 0 | Status: SHIPPED | OUTPATIENT
Start: 2024-08-21

## 2024-09-13 ENCOUNTER — HOSPITAL ENCOUNTER (OUTPATIENT)
Dept: CT IMAGING | Facility: HOSPITAL | Age: 59
Discharge: HOME | End: 2024-09-13
Payer: COMMERCIAL

## 2024-09-13 DIAGNOSIS — F17.211 CIGARETTE NICOTINE DEPENDENCE IN REMISSION: ICD-10-CM

## 2024-09-13 PROCEDURE — 71271 CT THORAX LUNG CANCER SCR C-: CPT

## 2024-10-08 DIAGNOSIS — J45.20 MILD INTERMITTENT ASTHMA WITHOUT COMPLICATION: ICD-10-CM

## 2024-10-08 RX ORDER — MONTELUKAST SODIUM 10 MG/1
10 TABLET ORAL
Qty: 30 TABLET | Refills: 0 | Status: SHIPPED | OUTPATIENT
Start: 2024-10-08

## 2024-10-08 NOTE — TELEPHONE ENCOUNTER
Reason for call:   [x] Refill   [] Prior Auth  [] Other:     Office:   [] PCP/Provider -   [x] Specialty/Provider - pulm     Medication:   montelukast (SINGULAIR) 10 mg tablet        Dose/Frequency:     TAKE 1 TABLET BY MOUTH ONCE DAILY AT BEDTIME    Quantity: 30    Pharmacy: [facility] [place]     Does the patient have enough for 3 days?   [] Yes   [x] No - Send as HP to POD

## 2024-10-18 ENCOUNTER — OFFICE VISIT (OUTPATIENT)
Dept: PULMONOLOGY | Facility: CLINIC | Age: 59
End: 2024-10-18
Payer: COMMERCIAL

## 2024-10-18 VITALS
HEIGHT: 68 IN | WEIGHT: 184.4 LBS | SYSTOLIC BLOOD PRESSURE: 105 MMHG | OXYGEN SATURATION: 97 % | HEART RATE: 67 BPM | BODY MASS INDEX: 27.95 KG/M2 | TEMPERATURE: 98.4 F | DIASTOLIC BLOOD PRESSURE: 69 MMHG

## 2024-10-18 DIAGNOSIS — Z23 FLU VACCINE NEED: ICD-10-CM

## 2024-10-18 DIAGNOSIS — J43.2 CENTRILOBULAR EMPHYSEMA (HCC): Chronic | ICD-10-CM

## 2024-10-18 DIAGNOSIS — J45.20 MILD INTERMITTENT ASTHMA WITHOUT COMPLICATION: Primary | ICD-10-CM

## 2024-10-18 PROCEDURE — 99214 OFFICE O/P EST MOD 30 MIN: CPT

## 2024-10-18 PROCEDURE — 90471 IMMUNIZATION ADMIN: CPT

## 2024-10-18 PROCEDURE — 90673 RIV3 VACCINE NO PRESERV IM: CPT

## 2024-10-18 RX ORDER — ALBUTEROL SULFATE 90 UG/1
2 INHALANT RESPIRATORY (INHALATION) EVERY 6 HOURS PRN
Qty: 18 G | Refills: 2 | Status: SHIPPED | OUTPATIENT
Start: 2024-10-18

## 2024-10-18 RX ORDER — FLUTICASONE PROPIONATE AND SALMETEROL 250; 50 UG/1; UG/1
1 POWDER RESPIRATORY (INHALATION) 2 TIMES DAILY
Qty: 60 BLISTER | Refills: 2 | Status: SHIPPED | OUTPATIENT
Start: 2024-10-18 | End: 2025-01-16

## 2024-10-18 NOTE — PATIENT INSTRUCTIONS
- Script for advair to take 1 puff twice daily when you're having symptoms, please rinse out your mouth after each use  - Continue with albuterol inhaler as needed  - Continue with montelukast  - Follow up in 6 months

## 2024-10-18 NOTE — PROGRESS NOTES
Progress note - Pulmonary Medicine   Stephanie Justice 59 y.o. female MRN: [id number]       Impression & Plan:   Stephanie Justice is a 59 y.o. female with PMHx of HTN, CAD, PVD, HFpEF, Afib s/p ablation, emphysema, asthma, NAFLD, DM2, psoriatic arthritis, ADHD, anxiety, former tobacco use, HLD and obesity who presents for follow-up.    Mild Intermittent Asthma without Exacerbation  - The patient has a history of asthma dating back to childhood and triggers include cold weather and seasonal allergies.  It is not immediately clear what inhaler she has at home at this time although she does definitively have albuterol HFA.  - Will write new prescription for Advair 250 mcg asked her to stop using any other inhalers aside from the albuterol.  Recommend she tries this 1 puff twice daily and rinse out her mouth after each use.  - Continue with albuterol HFA as needed  - Continue with montelukast 10 mg once daily  - Follow-up in 6 months.  -     Fluticasone-Salmeterol (Advair Diskus) 250-50 mcg/dose inhaler; Inhale 1 puff 2 (two) times a day Rinse mouth after use.  -     albuterol (PROVENTIL HFA,VENTOLIN HFA) 90 mcg/act inhaler; Inhale 2 puffs every 6 (six) hours as needed for wheezing    Centrilobular Emphysema  - Patient has mild emphysematous changes on prior LDCT, but last PFTs were not consistent with an obstructive deficit.  - Plan for inhaler therapy as above.  - Patient has already stopped smoking.  -     albuterol (PROVENTIL HFA,VENTOLIN HFA) 90 mcg/act inhaler; Inhale 2 puffs every 6 (six) hours as needed for wheezing    Flu Vaccine Need  - Influenza vaccine given in the office today.  -     influenza vaccine, recombinant, PF, 0.5 mL IM (Flublok)    Cigarette Nicotine Dependence in Remission  - The patient has remained tobacco free for the last 8 years, recommend continued cessation.  - She will next be due for LDCT in 9/2025.    Return in about 6 months (around  4/18/2025).  ______________________________________________________________________    HPI:    Stephanie Justice is a 59 y.o. female with PMHx of HTN, CAD, PVD, HFpEF, Afib s/p ablation, emphysema, asthma, NAFLD, DM2, psoriatic arthritis, ADHD, anxiety, former tobacco use, HLD and obesity who presents for follow-up.  The patient was last seen in the office on 5/3/2024 at which time plan was for the patient to call the office with which inhaler she had at home as it was unclear what she was using and she was unable to point them out on the inhaler chart, addition of montelukast 10 mg daily and cetirizine 10 mg daily and LDCT.  LDCT was unremarkable.  She did not reach back out with her inhaler type, it currently remains unclear despite use of the inhaler chart which she has at home, potentially Advair or Wixela or Trelegy, she does also definitively have albuterol HFA.  She states the cetirizine was not covered and she did not want to buy it OTC, but she did start on the montelukast.  She notes overall her breathing had been fairly stable until she went out in the cold the other day and had transient episode of dyspnea without much benefit from her albuterol.  Despite being unclear which inhaler she has at home, she states she is not consistently using any of them.  She reports that she has been tobacco free since her last office visit and denies any course of steroids or antibiotics for respiratory purposes.    Current Medications:    Current Outpatient Medications:     albuterol (PROVENTIL HFA,VENTOLIN HFA) 90 mcg/act inhaler, Inhale 2 puffs every 6 (six) hours as needed for wheezing, Disp: 18 g, Rfl: 2    Fluticasone-Salmeterol (Advair Diskus) 250-50 mcg/dose inhaler, Inhale 1 puff 2 (two) times a day Rinse mouth after use., Disp: 60 blister, Rfl: 2    acetaminophen (TYLENOL) 500 mg tablet, Take 500 mg by mouth every 6 (six) hours as needed, Disp: , Rfl:     amLODIPine (NORVASC) 2.5 mg tablet, Take 5 mg by mouth daily,  Disp: , Rfl:     Aspirin (ASPIR-81 PO), every 24 hours, Disp: , Rfl:     diclofenac sodium (VOLTAREN) 1 %, Apply 2 g topically 4 (four) times a day, Disp: , Rfl:     Diclofenac Sodium (VOLTAREN) 1 %, Apply 2 g topically 3 (three) times a day as needed (Pain) (Patient not taking: No sig reported), Disp: 150 g, Rfl: 3    dofetilide (TIKOSYN) 250 mcg capsule, TAKE 1 CAPSULE BY MOUTH TWICE DAILY, Disp: , Rfl:     etanercept (ENBREL SURECLICK) 50 MG/ML injection, Inject 50 mg under the skin Once a week, Disp: , Rfl:     furosemide (LASIX) 40 mg tablet, Take 1 tablet by mouth see administration instructions 1 tablet on Mon, Wed, Fri 1/2 tablet on Tues, Thurs, Sat, Sun , Disp: , Rfl:     lisinopril (ZESTRIL) 5 mg tablet, TAKE 1 TABLET BY MOUTH ONCE DAILY PLEASE CALL [phone] FOR AUG APPOINTMENT, Disp: , Rfl:     Magnesium 200 MG TABS, Take 1 tablet by mouth daily, Disp: , Rfl:     metoprolol succinate (TOPROL-XL) 25 mg 24 hr tablet, Take 25 mg by mouth 2 (two) times a day , Disp: , Rfl:     montelukast (SINGULAIR) 10 mg tablet, TAKE 1 TABLET BY MOUTH ONCE DAILY AT BEDTIME, Disp: 30 tablet, Rfl: 5    Multiple Vitamins-Minerals (MULTIVITAMIN ADULT PO), Take 2 tablets by mouth, Disp: , Rfl:     Ozempic, 0.25 or 0.5 MG/DOSE, 2 MG/3ML injection pen, Inject 0.25 mg under the skin Once a week, Disp: , Rfl:     Rinvoq 15 MG TB24, TAKE 1 TABLET BY MOUTH 1 TIME A DAY., Disp: , Rfl:     rosuvastatin (CRESTOR) 20 MG tablet, Take 20 mg by mouth, Disp: , Rfl:     XARELTO 20 MG tablet, , Disp: , Rfl:     Current Facility-Administered Medications:     levalbuterol (XOPENEX HFA) inhaler 1 puff, 1 puff, Inhalation, Q6H PRN, Curry Christianson MD    Review of Systems:  Review of Systems  10-point system review completed, all of which are negative except as mentioned above.    Past medical history, surgical history, and family history were reviewed and updated as appropriate    Social history updates:  Social History     Tobacco Use  "  Smoking Status Former    Current packs/day: 0.00    Average packs/day: 0.8 packs/day for 39.0 years (29.2 ttl pk-yrs)    Types: Cigarettes    Start date: 1977    Quit date: 2016    Years since quittin.8   Smokeless Tobacco Never   Tobacco Comments    no passive smoke exposure     PhysicalExamination:  Vitals:   /69 (BP Location: Left arm, Patient Position: Sitting, Cuff Size: Extra-Large)   Pulse 67   Temp 98.4 °F (36.9 °C) (Temporal)   Ht 5' 8\" (1.727 m)   Wt 83.6 kg (184 lb 6.4 oz)   SpO2 97%   BMI 28.04 kg/m²   Body mass index is 28.04 kg/m².    Constitutional: NAD, on room air, no conversational dyspnea   Skin: Warm, dry, no rashes noted   Eyes: PERRL, normal conjunctiva  ENT: Nasal congestion absent, moist mucus membranes.  Neck: No JVD, trachea is midline, no adenopathy.  Resp: CTA B/L, no W/R/R   Cardiac: RRR, +S1/S2, no M/R/G  Abdomen: Soft, NT/ND  Extremities: No digital clubbing or pedal edema  Neuro: AAOx3    Diagnostic Data:  Labs:  I personally reviewed the most recent laboratory data pertinent to today's visit    Lab Results   Component Value Date    WBC 7.35 2022    HGB 15.3 2022    HCT 44.8 2022    MCV 93 2022     2022     Lab Results   Component Value Date    SODIUM 144 2024    K 3.6 2024    CO2 30 2024     2024    BUN 16 2024    CREATININE 0.91 2024    GLUCOSE 100 2015    CALCIUM 9.7 2024     PFT results:  The most recent pulmonary function tests were reviewed.  PFTs w/ BD -- 2022  FEV1/FVC Ratio:  76%, FEV1 2.7 L/88%, FVC 3.56 L/90%  Lung volumes by body plethysmography: %, %, DLCO 93%     Imaging:  I personally reviewed the images in PACS pertinent to today's visit:  LDCT -- 2022  No suspicious nodules.  Lung-RADS1, negative. Continue annual screening with LDCT in 12 months.   Pulmonary artery enlargement which can be seen with pulmonary hypertension.  Moderate " "coronary artery calcification indicating atherosclerotic heart disease.     Other studies:  Echo Complete -- 11/5/2021    Left Ventricle: Left ventricle is normal in size. Systolic function is   low normal with an ejection fraction of 50-55%. Basilar inferior, basilar   inferolateral hypokinesis There is grade I (mild) diastolic dysfunction.     Left Atrium: Left atrium volume index is moderately increased.     Right Ventricle: Right ventricle cavity is normal. Systolic function is   normal.    Mitral Valve: The leaflets are mildly thickened. Leaflet tips are   calcified. There is mild regurgitation with a posteriorly directed jet.     PA pressure: The estimated pulmonary artery systolic pressure is 32.1   mmHg. Normal pulmonary artery pressure.       Cira Brown MD  Pulmonary-Critical Care and Sleep Medicine  10/30/24    Portions of the record may have been created with voice recognition software. Occasional wrong word or \"sound a like\" substitutions may have occurred due to the inherent limitations of voice recognition software. Please read the chart carefully and recognize, using context, where substitutions have occurred.  "

## 2024-10-29 DIAGNOSIS — J45.20 MILD INTERMITTENT ASTHMA WITHOUT COMPLICATION: ICD-10-CM

## 2024-10-29 RX ORDER — MONTELUKAST SODIUM 10 MG/1
10 TABLET ORAL
Qty: 30 TABLET | Refills: 5 | Status: SHIPPED | OUTPATIENT
Start: 2024-10-29

## 2025-01-17 ENCOUNTER — OFFICE VISIT (OUTPATIENT)
Dept: GASTROENTEROLOGY | Facility: CLINIC | Age: 60
End: 2025-01-17
Payer: COMMERCIAL

## 2025-01-17 VITALS
SYSTOLIC BLOOD PRESSURE: 100 MMHG | HEART RATE: 75 BPM | HEIGHT: 68 IN | BODY MASS INDEX: 27.28 KG/M2 | DIASTOLIC BLOOD PRESSURE: 60 MMHG | TEMPERATURE: 98.7 F | OXYGEN SATURATION: 99 % | WEIGHT: 180 LBS | RESPIRATION RATE: 18 BRPM

## 2025-01-17 DIAGNOSIS — K76.0 METABOLIC DYSFUNCTION-ASSOCIATED STEATOTIC LIVER DISEASE (MASLD): ICD-10-CM

## 2025-01-17 DIAGNOSIS — E80.6 HYPERBILIRUBINEMIA: ICD-10-CM

## 2025-01-17 DIAGNOSIS — Z12.11 SCREENING FOR COLON CANCER: Primary | ICD-10-CM

## 2025-01-17 PROCEDURE — 99214 OFFICE O/P EST MOD 30 MIN: CPT | Performed by: INTERNAL MEDICINE

## 2025-01-17 NOTE — PROGRESS NOTES
"Name: Stephanie Justice      : 1965      MRN: [id number]  Encounter Provider: Emiliano Salas MD  Encounter Date: 2025   [facility]  :  Assessment & Plan  Screening for colon cancer   - no prior colon   - cologuard 24 neg       Metabolic dysfunction-associated steatotic liver disease (MASLD)   - losing weight appropriately on Ozempic. She is down 60lb.    - due for repeat USE in around April. Given improvement in her metabolic syndrome, I expect her fibrosis to have been downgraded to F0-1.   - we will also obtain labs to further evaluate for advanced fibrosis   - counseled on alcohol sedation   - if her labs and imaging are normal, she can follow up as needed.     Orders:    US elastography/UGAP; Future    Enhanced Liver Fibrosis (ELF) Score; Future    Protime-INR; Future    CBC and differential; Future    Comprehensive metabolic panel; Future    Hyperbilirubinemia   - likely Gilbert's. Will fractionate LFTs for above to determine indirect bili           History of Present Illness   HPI  Stephanie Justice is a 60 y.o. female with a PMH of VIRGIL/MDD, Afib s/p ablation CAD, HFpEF, COPD, asthma, RA, PsA, who presents for consultation for isolated hyperbilirubinemia vs MASLD.    Consult is by endocrinologist for MASLD. \"Fibroscan on 4/15/2024 noting F3, median liver stiffness of 12. kPa, and median CAP value of 288 dB/m. Patient now w/ Ozempic treatment, diabetes control, and weight loss. Patient will need continued follow up with hepatology.\"    She was referred to Dr. Puente previously, however she has issues driving to [place], hence coming here.    Drinking: used to drink after work, but now is down to 4-5 coors lite per day on the weekends when she is camping.    Quit smoking.     Review of Systems   Constitutional:  Negative for appetite change, chills, fatigue and fever.   Eyes:  Negative for photophobia.   Respiratory:  Negative for cough and " "shortness of breath.    Cardiovascular:  Negative for chest pain.   Gastrointestinal:  Negative for abdominal pain, constipation, diarrhea, nausea and vomiting.   Endocrine: Negative.    Genitourinary:  Negative for dysuria and frequency.   Musculoskeletal:  Negative for myalgias.   Skin:  Negative for pallor.   Neurological:  Negative for weakness.   Hematological: Negative.    Psychiatric/Behavioral: Negative.        Objective   /60 (BP Location: Left arm, Patient Position: Sitting, Cuff Size: Standard)   Pulse 75   Temp 98.7 °F (37.1 °C) (Temporal)   Resp 18   Ht 5' 8\" (1.727 m)   Wt 81.6 kg (180 lb)   SpO2 99%   BMI 27.37 kg/m²      Physical Exam  Constitutional:       General: She is not in acute distress.     Appearance: Normal appearance.   HENT:      Head: Normocephalic and atraumatic.      Nose: Nose normal.      Mouth/Throat:      Mouth: Mucous membranes are moist.   Eyes:      General: No scleral icterus.     Extraocular Movements: Extraocular movements intact.      Conjunctiva/sclera: Conjunctivae normal.      Pupils: Pupils are equal, round, and reactive to light.   Cardiovascular:      Rate and Rhythm: Normal rate and regular rhythm.   Pulmonary:      Effort: Pulmonary effort is normal.   Abdominal:      General: Abdomen is flat. There is no distension.      Palpations: There is no mass.      Tenderness: There is no abdominal tenderness.   Musculoskeletal:         General: No swelling. Normal range of motion.   Skin:     General: Skin is warm and dry.      Capillary Refill: Capillary refill takes less than 2 seconds.   Neurological:      General: No focal deficit present.      Mental Status: She is alert.   Psychiatric:         Mood and Affect: Mood normal.           "

## 2025-02-04 ENCOUNTER — TELEPHONE (OUTPATIENT)
Dept: PULMONOLOGY | Facility: CLINIC | Age: 60
End: 2025-02-04

## 2025-04-03 ENCOUNTER — HOSPITAL ENCOUNTER (OUTPATIENT)
Dept: ULTRASOUND IMAGING | Facility: HOSPITAL | Age: 60
End: 2025-04-03
Payer: COMMERCIAL

## 2025-04-03 DIAGNOSIS — K76.0 METABOLIC DYSFUNCTION-ASSOCIATED STEATOTIC LIVER DISEASE (MASLD): ICD-10-CM

## 2025-04-03 PROCEDURE — 76981 USE PARENCHYMA: CPT

## 2025-04-04 ENCOUNTER — OFFICE VISIT (OUTPATIENT)
Dept: PULMONOLOGY | Facility: CLINIC | Age: 60
End: 2025-04-04
Payer: COMMERCIAL

## 2025-04-04 VITALS
HEART RATE: 62 BPM | TEMPERATURE: 97.6 F | BODY MASS INDEX: 27.74 KG/M2 | HEIGHT: 68 IN | DIASTOLIC BLOOD PRESSURE: 67 MMHG | WEIGHT: 183 LBS | SYSTOLIC BLOOD PRESSURE: 101 MMHG | OXYGEN SATURATION: 99 %

## 2025-04-04 DIAGNOSIS — J30.2 SEASONAL ALLERGIES: ICD-10-CM

## 2025-04-04 DIAGNOSIS — J45.30 MILD PERSISTENT ASTHMA WITHOUT COMPLICATION: ICD-10-CM

## 2025-04-04 DIAGNOSIS — J43.2 CENTRILOBULAR EMPHYSEMA (HCC): Primary | ICD-10-CM

## 2025-04-04 DIAGNOSIS — F17.211 CIGARETTE NICOTINE DEPENDENCE IN REMISSION: ICD-10-CM

## 2025-04-04 PROCEDURE — 99214 OFFICE O/P EST MOD 30 MIN: CPT

## 2025-04-04 RX ORDER — MONTELUKAST SODIUM 10 MG/1
10 TABLET ORAL
Qty: 30 TABLET | Refills: 5 | Status: SHIPPED | OUTPATIENT
Start: 2025-04-04

## 2025-04-04 NOTE — PROGRESS NOTES
Progress note - Pulmonary Medicine   Stephanie Justice 60 y.o. female MRN: [id number]       Impression & Plan:   Stephanie Justice is a 60 y.o. female with PMHx of HTN, CAD, PVD, HFrEF, Afib s/p ablation, emphysema, asthma, NAFLD, DM2, psoriatic arthritis, ADHD, anxiety, former tobacco use, HLD and obesity who presents for follow-up.    Mild persistent asthma without complication  Seasonal allergies  - The patient has a history of asthma dating back to childhood and triggers include cold weather and seasonal allergies.  She has not had any recent exacerbations and is relatively asymptomatic at present.  - Continue the Advair 250 mcg 1 puff twice daily and rinse out mouth after each use.  - Continue albuterol MDI as needed.  - Continue with montelukast 10 mg once daily, refill provided.  We discussed that as the spring starts if she starts having issues with her allergies she could consider the addition of cetirizine 10 mg once daily.  - She is up to date on vaccines.  - Follow-up in 6 months.  -     montelukast (SINGULAIR) 10 mg tablet; Take 1 tablet (10 mg total) by mouth daily at bedtime    Centrilobular emphysema (HCC)  - The patient has mild emphysematous changes on prior CT without obstructive deficit on PFTs.  Continue with inhaler therapy as above.  - Discussed the importance of maintaining smoking cessation.    Cigarette nicotine dependence in remission  - She continues to maintain her smoking cessation and is now roughly 9 years out from quitting.  We discussed that she will qualify for LDCT for another 6 years and is next due in 9/2025.  -     CT lung screening program; Future    Return in about 6 months (around 10/4/2025).  ______________________________________________________________________    HPI:    Stephanie Justice is a 60 y.o. female with PMHx of HTN, CAD, PVD, HFrEF, Afib s/p ablation, emphysema, asthma, NAFLD, DM2, psoriatic arthritis, ADHD, anxiety, former tobacco use, HLD and obesity who presents for follow-up.   The patient was last seen in the office on 10/18/2024 at which time plan was to switch to Advair 250 mcg, continue albuterol MDI as needed and maintain smoking cessation.  The patient reports she has been doing well since her last office visit and denies any exacerbations of her asthma or steroid/antibiotic use for respiratory purposes.  She continues take her advair 1 puff twice daily and rinse out her mouth after each use.  She estimates using her albuterol roughly 3 times a month generally at work due to the dust.  She denies any significant dyspnea, cough, hemoptysis or wheezing at this time.  She is taking montelukast 10 mg daily for her allergies and states this generally does well for her although she needs a refill.  She has maintained her smoking cessation since last office visit.    Current Medications:    Current Outpatient Medications:     montelukast (SINGULAIR) 10 mg tablet, Take 1 tablet (10 mg total) by mouth daily at bedtime, Disp: 30 tablet, Rfl: 5    acetaminophen (TYLENOL) 500 mg tablet, Take 500 mg by mouth every 6 (six) hours as needed, Disp: , Rfl:     albuterol (PROVENTIL HFA,VENTOLIN HFA) 90 mcg/act inhaler, Inhale 2 puffs every 6 (six) hours as needed for wheezing, Disp: 18 g, Rfl: 2    amLODIPine (NORVASC) 2.5 mg tablet, Take 5 mg by mouth daily, Disp: , Rfl:     Aspirin (ASPIR-81 PO), every 24 hours, Disp: , Rfl:     diclofenac sodium (VOLTAREN) 1 %, Apply 2 g topically 4 (four) times a day, Disp: , Rfl:     Diclofenac Sodium (VOLTAREN) 1 %, Apply 2 g topically 3 (three) times a day as needed (Pain) (Patient not taking: Reported on 5/2/2022), Disp: 150 g, Rfl: 3    dofetilide (TIKOSYN) 250 mcg capsule, TAKE 1 CAPSULE BY MOUTH TWICE DAILY, Disp: , Rfl:     etanercept (ENBREL SURECLICK) 50 MG/ML injection, Inject 50 mg under the skin Once a week, Disp: , Rfl:     Fluticasone-Salmeterol (Advair Diskus) 250-50 mcg/dose inhaler, Inhale 1 puff 2 (two) times a day Rinse mouth after use., Disp:  "60 blister, Rfl: 2    furosemide (LASIX) 40 mg tablet, Take 1 tablet by mouth see administration instructions 1 tablet on Mon, Wed, Fri 1/2 tablet on Tues, Th, Sat, Sun , Disp: , Rfl:     lisinopril (ZESTRIL) 5 mg tablet, TAKE 1 TABLET BY MOUTH ONCE DAILY PLEASE CALL [phone] FOR AUG APPOINTMENT, Disp: , Rfl:     Magnesium 200 MG TABS, Take 1 tablet by mouth daily, Disp: , Rfl:     metoprolol succinate (TOPROL-XL) 25 mg 24 hr tablet, Take 25 mg by mouth 2 (two) times a day , Disp: , Rfl:     Multiple Vitamins-Minerals (MULTIVITAMIN ADULT PO), Take 2 tablets by mouth (Patient not taking: Reported on 2025), Disp: , Rfl:     Ozempic, 0.25 or 0.5 MG/DOSE, 2 MG/3ML injection pen, Inject 0.25 mg under the skin Once a week, Disp: , Rfl:     Rinvoq 15 MG TB24, TAKE 1 TABLET BY MOUTH 1 TIME A DAY., Disp: , Rfl:     rosuvastatin (CRESTOR) 20 MG tablet, Take 20 mg by mouth, Disp: , Rfl:     XARELTO 20 MG tablet, , Disp: , Rfl:     Current Facility-Administered Medications:     levalbuterol (XOPENEX HFA) inhaler 1 puff, 1 puff, Inhalation, Q6H PRN, Curry Christianson MD    Review of Systems:  Review of Systems  10-point system review completed, all of which are negative except as mentioned above.    Past medical history, surgical history, and family history were reviewed and updated as appropriate    Social history updates:  Social History     Tobacco Use   Smoking Status Former    Current packs/day: 0.00    Average packs/day: 0.8 packs/day for 39.0 years (29.2 ttl pk-yrs)    Types: Cigarettes    Start date: 1977    Quit date: 2016    Years since quittin.2   Smokeless Tobacco Never   Tobacco Comments    no passive smoke exposure     PhysicalExamination:  Vitals:   /67 (BP Location: Left arm, Patient Position: Sitting, Cuff Size: Adult)   Pulse 62   Temp 97.6 °F (36.4 °C) (Temporal)   Ht 5' 8\" (1.727 m)   Wt 83 kg (183 lb)   SpO2 99%   BMI 27.83 kg/m²   Body mass index is 27.83 " kg/m².    Constitutional: NAD, on room air, no conversational dyspnea   Skin: Warm, dry, no rashes noted   Eyes: PERRL, normal conjunctiva  ENT: Nasal congestion absent, moist mucus membranes.  Neck: No JVD, trachea is midline, no adenopathy.  Resp: CTA B/L, no W/R/R   Cardiac: RRR, +S1/S2, no M/R/G  Extremities: No digital clubbing or pedal edema  Neuro: AAOx3    Diagnostic Data:  Labs:  I personally reviewed the most recent laboratory data pertinent to today's visit    Lab Results   Component Value Date    WBC 7.35 03/29/2022    HGB 15.3 03/29/2022    HCT 44.8 03/29/2022    MCV 93 03/29/2022     03/29/2022     Lab Results   Component Value Date    SODIUM 141 02/27/2025    K 4 02/27/2025    CO2 29 02/27/2025     02/27/2025    BUN 25 02/27/2025    CREATININE 1.01 02/27/2025    GLUCOSE 100 03/02/2015    CALCIUM 9.5 02/27/2025     PFT results:  The most recent pulmonary function tests were reviewed.  PFTs w/ BD -- 6/7/2022  FEV1/FVC Ratio:  76%, FEV1 2.7 L/88%, FVC 3.56 L/90%  Lung volumes by body plethysmography: %, %, DLCO 93%     Imaging:  I personally reviewed the images in PACS pertinent to today's visit:  LDCT -- 9/13/2024  No new or suspicious pulmonary nodules.  Lung-RADS1, negative. Continue annual screening with LDCT in 12 months.     Other studies:  Echo Complete -- 1/23/2025 ([facility])    Left Ventricle: Left ventricle is normal in size. Systolic function is   mildly decreased with an ejection fraction of 45-50% (visual estimation).   Basal inferior, basal inferoseptal and basal to mid inferolateral wall   akinesis suggesting RCA and left circumflex territory scar. There is grade   II (moderate) diastolic dysfunction and normal left atrial pressure.     Right Ventricle: Right ventricle cavity is normal. Systolic function is   normal.    Mitral Valve: Mildly restricted posterior mitral leaflet. There is mild   to moderate regurgitation with a posteriorly directed jet.     Pulmonic  "Valve: Normal pulmonary artery pressure.       Cira Brown MD  Pulmonary-Critical Care and Sleep Medicine  04/04/25    Portions of the record may have been created with voice recognition software. Occasional wrong word or \"sound a like\" substitutions may have occurred due to the inherent limitations of voice recognition software. Please read the chart carefully and recognize, using context, where substitutions have occurred.  "

## 2025-04-04 NOTE — PATIENT INSTRUCTIONS
- Continue with advair 1 puff twice daily and rinse out mouth after each use  - Continue with albuterol inhaler as needed  - Refills of montelukast (singulair) 10mg daily sent to your pharmacy  - CT chest in 9/2025  - Follow up in 6 months

## 2025-04-06 ENCOUNTER — RESULTS FOLLOW-UP (OUTPATIENT)
Dept: GASTROENTEROLOGY | Facility: CLINIC | Age: 60
End: 2025-04-06